# Patient Record
Sex: MALE | Race: WHITE | NOT HISPANIC OR LATINO | Employment: FULL TIME | URBAN - NONMETROPOLITAN AREA
[De-identification: names, ages, dates, MRNs, and addresses within clinical notes are randomized per-mention and may not be internally consistent; named-entity substitution may affect disease eponyms.]

---

## 2021-01-28 ENCOUNTER — APPOINTMENT (OUTPATIENT)
Dept: LAB | Facility: HOSPITAL | Age: 56
End: 2021-01-28

## 2021-01-28 ENCOUNTER — LAB (OUTPATIENT)
Dept: LAB | Facility: HOSPITAL | Age: 56
End: 2021-01-28

## 2021-01-28 ENCOUNTER — OFFICE VISIT (OUTPATIENT)
Dept: FAMILY MEDICINE CLINIC | Facility: CLINIC | Age: 56
End: 2021-01-28

## 2021-01-28 VITALS
RESPIRATION RATE: 20 BRPM | OXYGEN SATURATION: 100 % | TEMPERATURE: 97.7 F | SYSTOLIC BLOOD PRESSURE: 142 MMHG | WEIGHT: 265.9 LBS | HEIGHT: 72 IN | HEART RATE: 97 BPM | BODY MASS INDEX: 36.01 KG/M2 | DIASTOLIC BLOOD PRESSURE: 80 MMHG

## 2021-01-28 DIAGNOSIS — E11.9 TYPE 2 DIABETES MELLITUS WITHOUT COMPLICATION, WITHOUT LONG-TERM CURRENT USE OF INSULIN (HCC): ICD-10-CM

## 2021-01-28 DIAGNOSIS — Z12.5 PROSTATE CANCER SCREENING: ICD-10-CM

## 2021-01-28 DIAGNOSIS — R03.0 ELEVATED BP WITHOUT DIAGNOSIS OF HYPERTENSION: ICD-10-CM

## 2021-01-28 DIAGNOSIS — E66.01 CLASS 2 SEVERE OBESITY DUE TO EXCESS CALORIES WITH SERIOUS COMORBIDITY AND BODY MASS INDEX (BMI) OF 36.0 TO 36.9 IN ADULT (HCC): ICD-10-CM

## 2021-01-28 DIAGNOSIS — Z00.00 ANNUAL PHYSICAL EXAM: ICD-10-CM

## 2021-01-28 DIAGNOSIS — Z11.59 NEED FOR HEPATITIS C SCREENING TEST: ICD-10-CM

## 2021-01-28 DIAGNOSIS — K21.9 GASTROESOPHAGEAL REFLUX DISEASE WITHOUT ESOPHAGITIS: ICD-10-CM

## 2021-01-28 DIAGNOSIS — Z76.89 ENCOUNTER TO ESTABLISH CARE: Primary | ICD-10-CM

## 2021-01-28 LAB
ALBUMIN SERPL-MCNC: 4.3 G/DL (ref 3.5–5.2)
ALBUMIN/GLOB SERPL: 1.2 G/DL
ALP SERPL-CCNC: 119 U/L (ref 39–117)
ALT SERPL W P-5'-P-CCNC: 25 U/L (ref 1–41)
ANION GAP SERPL CALCULATED.3IONS-SCNC: 10 MMOL/L (ref 5–15)
AST SERPL-CCNC: 18 U/L (ref 1–40)
BILIRUB SERPL-MCNC: 0.2 MG/DL (ref 0–1.2)
BUN SERPL-MCNC: 19 MG/DL (ref 6–20)
BUN/CREAT SERPL: 18.3 (ref 7–25)
CALCIUM SPEC-SCNC: 10.3 MG/DL (ref 8.6–10.5)
CHLORIDE SERPL-SCNC: 99 MMOL/L (ref 98–107)
CO2 SERPL-SCNC: 27 MMOL/L (ref 22–29)
CREAT SERPL-MCNC: 1.04 MG/DL (ref 0.76–1.27)
GFR SERPL CREATININE-BSD FRML MDRD: 75 ML/MIN/1.73
GLOBULIN UR ELPH-MCNC: 3.6 GM/DL
GLUCOSE BLDC GLUCOMTR-MCNC: 295 MG/DL (ref 70–130)
GLUCOSE SERPL-MCNC: 316 MG/DL (ref 65–99)
HBA1C MFR BLD: 9.5 %
POTASSIUM SERPL-SCNC: 4.1 MMOL/L (ref 3.5–5.2)
PROT SERPL-MCNC: 7.9 G/DL (ref 6–8.5)
SODIUM SERPL-SCNC: 136 MMOL/L (ref 136–145)

## 2021-01-28 PROCEDURE — G0103 PSA SCREENING: HCPCS

## 2021-01-28 PROCEDURE — 85025 COMPLETE CBC W/AUTO DIFF WBC: CPT

## 2021-01-28 PROCEDURE — 82570 ASSAY OF URINE CREATININE: CPT

## 2021-01-28 PROCEDURE — 86803 HEPATITIS C AB TEST: CPT

## 2021-01-28 PROCEDURE — 82043 UR ALBUMIN QUANTITATIVE: CPT

## 2021-01-28 PROCEDURE — 80061 LIPID PANEL: CPT

## 2021-01-28 PROCEDURE — 84439 ASSAY OF FREE THYROXINE: CPT

## 2021-01-28 PROCEDURE — 36415 COLL VENOUS BLD VENIPUNCTURE: CPT

## 2021-01-28 PROCEDURE — 80053 COMPREHEN METABOLIC PANEL: CPT

## 2021-01-28 PROCEDURE — 83036 HEMOGLOBIN GLYCOSYLATED A1C: CPT | Performed by: NURSE PRACTITIONER

## 2021-01-28 PROCEDURE — 84443 ASSAY THYROID STIM HORMONE: CPT

## 2021-01-28 PROCEDURE — 99204 OFFICE O/P NEW MOD 45 MIN: CPT | Performed by: NURSE PRACTITIONER

## 2021-01-28 PROCEDURE — 82962 GLUCOSE BLOOD TEST: CPT | Performed by: NURSE PRACTITIONER

## 2021-01-28 RX ORDER — OMEPRAZOLE 20 MG/1
20 CAPSULE, DELAYED RELEASE ORAL DAILY
COMMUNITY
End: 2022-02-10 | Stop reason: HOSPADM

## 2021-01-28 RX ORDER — LOSARTAN POTASSIUM 25 MG/1
25 TABLET ORAL DAILY
Qty: 30 TABLET | Refills: 5 | Status: CANCELLED | OUTPATIENT
Start: 2021-01-28

## 2021-01-28 RX ORDER — METFORMIN HYDROCHLORIDE 500 MG/1
1000 TABLET, EXTENDED RELEASE ORAL 2 TIMES DAILY WITH MEALS
Qty: 360 TABLET | Refills: 2 | Status: SHIPPED | OUTPATIENT
Start: 2021-01-28

## 2021-01-28 NOTE — PATIENT INSTRUCTIONS
Diabetes Basics    Diabetes (diabetes mellitus) is a long-term (chronic) disease. It occurs when the body does not properly use sugar (glucose) that is released from food after you eat.  Diabetes may be caused by one or both of these problems:  · Your pancreas does not make enough of a hormone called insulin.  · Your body does not react in a normal way to insulin that it makes.  Insulin lets sugars (glucose) go into cells in your body. This gives you energy. If you have diabetes, sugars cannot get into cells. This causes high blood sugar (hyperglycemia).  Follow these instructions at home:  How is diabetes treated?  You may need to take insulin or other diabetes medicines daily to keep your blood sugar in balance. Take your diabetes medicines every day as told by your doctor. List your diabetes medicines here:  Diabetes medicines  · Name of medicine: ______________________________  ? Amount (dose): _______________ Time (a.m./p.m.): _______________ Notes: ___________________________________  · Name of medicine: ______________________________  ? Amount (dose): _______________ Time (a.m./p.m.): _______________ Notes: ___________________________________  · Name of medicine: ______________________________  ? Amount (dose): _______________ Time (a.m./p.m.): _______________ Notes: ___________________________________  If you use insulin, you will learn how to give yourself insulin by injection. You may need to adjust the amount based on the food that you eat. List the types of insulin you use here:  Insulin  · Insulin type: ______________________________  ? Amount (dose): _______________ Time (a.m./p.m.): _______________ Notes: ___________________________________  · Insulin type: ______________________________  ? Amount (dose): _______________ Time (a.m./p.m.): _______________ Notes: ___________________________________  · Insulin type: ______________________________  ? Amount (dose): _______________ Time (a.m./p.m.):  _______________ Notes: ___________________________________  · Insulin type: ______________________________  ? Amount (dose): _______________ Time (a.m./p.m.): _______________ Notes: ___________________________________  · Insulin type: ______________________________  ? Amount (dose): _______________ Time (a.m./p.m.): _______________ Notes: ___________________________________  How do I manage my blood sugar?    Check your blood sugar levels using a blood glucose monitor as directed by your doctor.  Your doctor will set treatment goals for you. Generally, you should have these blood sugar levels:  · Before meals (preprandial):  mg/dL (4.4-7.2 mmol/L).  · After meals (postprandial): below 180 mg/dL (10 mmol/L).  · A1c level: less than 7%.  Write down the times that you will check your blood sugar levels:  Blood sugar checks  · Time: _______________ Notes: ___________________________________  · Time: _______________ Notes: ___________________________________  · Time: _______________ Notes: ___________________________________  · Time: _______________ Notes: ___________________________________  · Time: _______________ Notes: ___________________________________  · Time: _______________ Notes: ___________________________________    What do I need to know about low blood sugar?  Low blood sugar is called hypoglycemia. This is when blood sugar is at or below 70 mg/dL (3.9 mmol/L). Symptoms may include:  · Feeling:  ? Hungry.  ? Worried or nervous (anxious).  ? Sweaty and clammy.  ? Confused.  ? Dizzy.  ? Sleepy.  ? Sick to your stomach (nauseous).  · Having:  ? A fast heartbeat.  ? A headache.  ? A change in your vision.  ? Tingling or no feeling (numbness) around the mouth, lips, or tongue.  ? Jerky movements that you cannot control (seizure).  · Having trouble with:  ? Moving (coordination).  ? Sleeping.  ? Passing out (fainting).  ? Getting upset easily (irritability).  Treating low blood sugar  To treat low blood  sugar, eat or drink something sugary right away. If you can think clearly and swallow safely, follow the 15:15 rule:  · Take 15 grams of a fast-acting carb (carbohydrate). Talk with your doctor about how much you should take.  · Some fast-acting carbs are:  ? Sugar tablets (glucose pills). Take 3-4 glucose pills.  ? 6-8 pieces of hard candy.  ? 4-6 oz (120-150 mL) of fruit juice.  ? 4-6 oz (120-150 mL) of regular (not diet) soda.  ? 1 Tbsp (15 mL) honey or sugar.  · Check your blood sugar 15 minutes after you take the carb.  · If your blood sugar is still at or below 70 mg/dL (3.9 mmol/L), take 15 grams of a carb again.  · If your blood sugar does not go above 70 mg/dL (3.9 mmol/L) after 3 tries, get help right away.  · After your blood sugar goes back to normal, eat a meal or a snack within 1 hour.  Treating very low blood sugar  If your blood sugar is at or below 54 mg/dL (3 mmol/L), you have very low blood sugar (severe hypoglycemia). This is an emergency. Do not wait to see if the symptoms will go away. Get medical help right away. Call your local emergency services (911 in the [place] Do not drive yourself to the hospital.  Questions to ask your health care provider  · Do I need to meet with a diabetes educator?  · What equipment will I need to care for myself at home?  · What diabetes medicines do I need? When should I take them?  · How often do I need to check my blood sugar?  · What number can I call if I have questions?  · When is my next doctor's visit?  · Where can I find a support group for people with diabetes?  Where to find more information  · American Diabetes Association: www.diabetes.org  · American Association of Diabetes Educators: www.diabeteseducator.org/patient-resources  Contact a doctor if:  · Your blood sugar is at or above 240 mg/dL (13.3 mmol/L) for 2 days in a row.  · You have been sick or have had a fever for 2 days or more, and you are not getting better.  · You have any of these  problems for more than 6 hours:  ? You cannot eat or drink.  ? You feel sick to your stomach (nauseous).  ? You throw up (vomit).  ? You have watery poop (diarrhea).  Get help right away if:  · Your blood sugar is lower than 54 mg/dL (3 mmol/L).  · You get confused.  · You have trouble:  ? Thinking clearly.  ? Breathing.  Summary  · Diabetes (diabetes mellitus) is a long-term (chronic) disease. It occurs when the body does not properly use sugar (glucose) that is released from food after digestion.  · Take insulin and diabetes medicines as told.  · Check your blood sugar every day, as often as told.  · Keep all follow-up visits as told by your doctor. This is important.  This information is not intended to replace advice given to you by your health care provider. Make sure you discuss any questions you have with your health care provider.  Document Revised: 09/09/2020 Document Reviewed: 03/22/2019  Elsevier Patient Education © 2020 [employer]

## 2021-01-28 NOTE — PROGRESS NOTES
Subjective   Louis Brooks is a 55 y.o. male.     Diabetes  He presents for his initial diabetic visit. He has type 2 diabetes mellitus. Onset time: at least 1 year. His disease course has been worsening. There are no hypoglycemic associated symptoms. Pertinent negatives for hypoglycemia include no dizziness. Associated symptoms include polydipsia, polyphagia and polyuria. Pertinent negatives for diabetes include no blurred vision, no chest pain, no fatigue, no weakness and no weight loss. There are no hypoglycemic complications. Symptoms are stable. There are no diabetic complications. Risk factors for coronary artery disease include family history, diabetes mellitus, male sex and obesity. When asked about current treatments, none were reported. He is compliant with treatment some of the time. His weight is stable. He is following a diabetic diet. Meal planning includes avoidance of concentrated sweets. He has not had a previous visit with a dietitian. He rarely participates in exercise. His breakfast blood glucose range is generally >200 mg/dl. (280 this morning  295 in office this afternoon  ) An ACE inhibitor/angiotensin II receptor blocker is not being taken. He does not see a podiatrist.Eye exam is not current.   Obesity  This is a chronic problem. The current episode started more than 1 year ago. The problem occurs constantly. The problem has been unchanged. Pertinent negatives include no abdominal pain, chest pain, chills, congestion, coughing, diaphoresis, fatigue, fever, myalgias, nausea, rash, sore throat, vomiting or weakness. The symptoms are aggravated by drinking and eating. He has tried eating and drinking for the symptoms. The treatment provided no relief.        The following portions of the patient's history were reviewed and updated as appropriate: allergies, current medications, past family history, past medical history, past social history, past surgical history and problem list.    Review of  Systems   Constitutional: Negative for activity change, appetite change, chills, diaphoresis, fatigue, fever, unexpected weight gain and unexpected weight loss.   HENT: Negative for congestion, sore throat, trouble swallowing and voice change.    Eyes: Negative for blurred vision, double vision, photophobia, pain and visual disturbance.   Respiratory: Negative for cough, chest tightness, shortness of breath and wheezing.    Cardiovascular: Negative for chest pain, palpitations and leg swelling.        Recheck blood pressure, 142/80.  Patient reports blood pressure at home is typically in the 130s/70s.   Gastrointestinal: Negative for abdominal distention, abdominal pain, anal bleeding, blood in stool, constipation, diarrhea, nausea, vomiting, GERD and indigestion.   Endocrine: Positive for polydipsia, polyphagia and polyuria. Negative for cold intolerance and heat intolerance.        Patient reports noticing polyuria, polydipsia and polyphagia approximately 1 year ago.  He bought a glucometer over-the-counter and his blood sugars at that time were running over 300.  He did not present for evaluation.  Instead he changed his diet.  Through diet modification he was able to get his glucose down into the high 100s.  Over the last year he states his diet has slipped and his blood sugar has been increasing again.  He has also noticed increase in polyuria and polyphagia along with fatigue.  He denies chest pain, shortness of breath or palpitations.    Fingerstick blood sugar in office today 295.  Stick hemoglobin A1c 9.5.   Genitourinary: Negative for dysuria, hematuria and urgency.   Musculoskeletal: Negative for back pain and myalgias.   Skin: Negative for rash.   Allergic/Immunologic: Negative.    Neurological: Negative for dizziness, syncope, weakness, light-headedness and headache.   Hematological: Negative.    Psychiatric/Behavioral: Negative for depressed mood.       Objective   Physical Exam  Vitals signs and  nursing note reviewed.   Constitutional:       General: He is not in acute distress.     Appearance: Normal appearance. He is well-developed. He is obese. He is not ill-appearing, toxic-appearing or diaphoretic.   HENT:      Head: Normocephalic and atraumatic.      Right Ear: External ear normal.      Left Ear: External ear normal.      Nose: Nose normal.   Eyes:      Conjunctiva/sclera: Conjunctivae normal.      Pupils: Pupils are equal, round, and reactive to light.   Neck:      Musculoskeletal: Normal range of motion and neck supple.      Thyroid: No thyromegaly.      Vascular: No carotid bruit.      Trachea: No tracheal deviation.   Cardiovascular:      Rate and Rhythm: Normal rate and regular rhythm.      Heart sounds: Normal heart sounds. No murmur. No friction rub. No gallop.    Pulmonary:      Effort: Pulmonary effort is normal. No respiratory distress.      Breath sounds: Normal breath sounds. No stridor. No wheezing, rhonchi or rales.   Abdominal:      General: Bowel sounds are normal. There is no distension.      Palpations: Abdomen is soft. There is no mass.      Tenderness: There is no abdominal tenderness. There is no guarding or rebound.      Hernia: No hernia is present.   Musculoskeletal: Normal range of motion.         General: No tenderness.   Lymphadenopathy:      Cervical: No cervical adenopathy.   Skin:     General: Skin is warm and dry.      Coloration: Skin is not pale.      Findings: No erythema or rash.   Neurological:      Mental Status: He is alert and oriented to person, place, and time.      Cranial Nerves: No cranial nerve deficit.      Coordination: Coordination normal.   Psychiatric:         Attention and Perception: Attention and perception normal.         Mood and Affect: Mood and affect normal.         Speech: Speech normal.         Behavior: Behavior normal. Behavior is cooperative.         Thought Content: Thought content normal. Thought content is not paranoid or delusional.  Thought content does not include homicidal or suicidal ideation. Thought content does not include homicidal or suicidal plan.         Cognition and Memory: Cognition and memory normal.         Judgment: Judgment normal.           Assessment/Plan   Diagnoses and all orders for this visit:    1. Encounter to establish care (Primary)    2. Annual physical exam  -     Hepatitis C Antibody; Future  -     CBC & Differential; Future  -     Comprehensive Metabolic Panel; Future  -     Lipid Panel; Future  -     T4, Free; Future  -     TSH; Future  -     PSA Screen; Future  -     Microalbumin / Creatinine Urine Ratio - Urine, Clean Catch; Future, will call with results.    3. Need for hepatitis C screening test  -     Hepatitis C Antibody; Future, will call with results.    4. Prostate cancer screening  -     PSA Screen; Future, will call with results.    5. Elevated BP without diagnosis of hypertension    -Patient was in a hurry today.  Upon recheck his blood pressure was improved to 142/80.  He reports blood pressures at home less than 140/90.  Instructed patient to continue to monitor blood pressure at home.  Low-sodium diet and weight loss recommended.  We will continue to monitor.    6. Class 2 severe obesity due to excess calories with serious comorbidity and body mass index (BMI) of 36.0 to 36.9 in adult (CMS/[facility])   -Strict diabetic diet.  Will start Metformin today as noted below.  Recommended exercise 3-5 times a week for least 30 minutes.  We will continue to monitor.    7. Type 2 diabetes mellitus without complication, without long-term current use of insulin (CMS/[facility])  -     Comprehensive Metabolic Panel; Future  -     Lipid Panel; Future  -     Microalbumin / Creatinine Urine Ratio - Urine, Clean Catch; Future  -     metFORMIN ER (GLUCOPHAGE-XR) 500 MG 24 hr tablet; Take 2 tablets by mouth 2 (Two) Times a Day With Meals.  Dispense: 360 tablet; Refill: 2  -     POCT Glucose, 295  -     POC Glycosylated Hemoglobin  (Hb A1C), 9.5   -Will start Metformin 500 mg 2 tabs twice a day with meals.  Instructed patient to slowly taper Metformin up as follows: 1 tablet twice a day for a week if tolerated will increase to 2 tablets in the morning 1 tablet in the evening x1 week if Metformin continues to be tolerated increase to 2 tablets twice daily.  Strict diabetic diet.  Patient has home glucometer.  Instructed patient to monitor and log twice daily and as needed for symptoms.  Diabetic pamphlets and handouts provided to patient.  Diabetic diet and routine exercise discussed with patient.  Patient verbalized understanding instruction.  We will continue to monitor.    8. Gastroesophageal reflux disease without esophagitis   -Currently well controlled with Prilosec 20 mg daily.  We will continue to monitor.    9.  Follow-up in 1 month or sooner for any acute needs.          This document has been electronically signed by FLORENCE Cabrera on January 28, 2021 16:16 CST

## 2021-01-29 LAB
ALBUMIN UR-MCNC: <1.2 MG/DL
BASOPHILS # BLD AUTO: 0.03 10*3/MM3 (ref 0–0.2)
BASOPHILS NFR BLD AUTO: 0.4 % (ref 0–1.5)
CHOLEST SERPL-MCNC: 216 MG/DL (ref 0–200)
CREAT UR-MCNC: 81.2 MG/DL
DEPRECATED RDW RBC AUTO: 43.2 FL (ref 37–54)
EOSINOPHIL # BLD AUTO: 0.28 10*3/MM3 (ref 0–0.4)
EOSINOPHIL NFR BLD AUTO: 3.7 % (ref 0.3–6.2)
ERYTHROCYTE [DISTWIDTH] IN BLOOD BY AUTOMATED COUNT: 13.5 % (ref 12.3–15.4)
HCT VFR BLD AUTO: 43.1 % (ref 37.5–51)
HCV AB SER DONR QL: NORMAL
HDLC SERPL-MCNC: 34 MG/DL (ref 40–60)
HGB BLD-MCNC: 14.9 G/DL (ref 13–17.7)
IMM GRANULOCYTES # BLD AUTO: 0.03 10*3/MM3 (ref 0–0.05)
IMM GRANULOCYTES NFR BLD AUTO: 0.4 % (ref 0–0.5)
LDLC SERPL CALC-MCNC: 146 MG/DL (ref 0–100)
LDLC/HDLC SERPL: 4.18 {RATIO}
LYMPHOCYTES # BLD AUTO: 2.84 10*3/MM3 (ref 0.7–3.1)
LYMPHOCYTES NFR BLD AUTO: 37.4 % (ref 19.6–45.3)
MCH RBC QN AUTO: 30.5 PG (ref 26.6–33)
MCHC RBC AUTO-ENTMCNC: 34.6 G/DL (ref 31.5–35.7)
MCV RBC AUTO: 88.3 FL (ref 79–97)
MICROALBUMIN/CREAT UR: NORMAL MG/G{CREAT}
MONOCYTES # BLD AUTO: 0.55 10*3/MM3 (ref 0.1–0.9)
MONOCYTES NFR BLD AUTO: 7.2 % (ref 5–12)
NEUTROPHILS NFR BLD AUTO: 3.86 10*3/MM3 (ref 1.7–7)
NEUTROPHILS NFR BLD AUTO: 50.9 % (ref 42.7–76)
NRBC BLD AUTO-RTO: 0 /100 WBC (ref 0–0.2)
PLATELET # BLD AUTO: 227 10*3/MM3 (ref 140–450)
PMV BLD AUTO: 10 FL (ref 6–12)
PSA SERPL-MCNC: 0.75 NG/ML (ref 0–4)
RBC # BLD AUTO: 4.88 10*6/MM3 (ref 4.14–5.8)
T4 FREE SERPL-MCNC: 1.06 NG/DL (ref 0.93–1.7)
TRIGL SERPL-MCNC: 200 MG/DL (ref 0–150)
TSH SERPL DL<=0.05 MIU/L-ACNC: 1.95 UIU/ML (ref 0.27–4.2)
VLDLC SERPL-MCNC: 36 MG/DL (ref 5–40)
WBC # BLD AUTO: 7.59 10*3/MM3 (ref 3.4–10.8)

## 2021-01-29 RX ORDER — ATORVASTATIN CALCIUM 40 MG/1
40 TABLET, FILM COATED ORAL NIGHTLY
Qty: 90 TABLET | Refills: 2 | Status: SHIPPED | OUTPATIENT
Start: 2021-01-29

## 2021-02-10 ENCOUNTER — TELEPHONE (OUTPATIENT)
Dept: FAMILY MEDICINE CLINIC | Facility: CLINIC | Age: 56
End: 2021-02-10

## 2021-02-10 NOTE — TELEPHONE ENCOUNTER
Patient was called to let him know PCP will discuss medication request at follow up visit as additional testing may be needed.     ----- Message from FLORENCE Cabrera sent at 2/10/2021 10:52 AM CST -----  Regarding: Non-Urgent Medical Question  Contact: [phone]  Will discuss at follow-up.  Prior to any ED medication he would need a little bit further eval such as an EKG before we could proceed.    ----- Message -----  From: Louis Brooks  Sent: 2/10/2021  10:45 AM CST  To: [facility] Clinical Pool  Subject: Non-Urgent Medical Question                      Since I have had new lab work completed, is it possible for you to prescribe me the Cialis Daily 5mg or would I have to make another appointment.?    Thank you.

## 2021-02-25 ENCOUNTER — OFFICE VISIT (OUTPATIENT)
Dept: FAMILY MEDICINE CLINIC | Facility: CLINIC | Age: 56
End: 2021-02-25

## 2021-02-25 VITALS
HEIGHT: 72 IN | BODY MASS INDEX: 29.43 KG/M2 | TEMPERATURE: 97.2 F | SYSTOLIC BLOOD PRESSURE: 158 MMHG | DIASTOLIC BLOOD PRESSURE: 90 MMHG | WEIGHT: 217.3 LBS | HEART RATE: 90 BPM | OXYGEN SATURATION: 98 % | RESPIRATION RATE: 18 BRPM

## 2021-02-25 DIAGNOSIS — K21.9 GASTROESOPHAGEAL REFLUX DISEASE WITHOUT ESOPHAGITIS: ICD-10-CM

## 2021-02-25 DIAGNOSIS — E11.9 TYPE 2 DIABETES MELLITUS WITHOUT COMPLICATION, WITHOUT LONG-TERM CURRENT USE OF INSULIN (HCC): Primary | ICD-10-CM

## 2021-02-25 DIAGNOSIS — E66.01 CLASS 2 SEVERE OBESITY DUE TO EXCESS CALORIES WITH SERIOUS COMORBIDITY AND BODY MASS INDEX (BMI) OF 36.0 TO 36.9 IN ADULT (HCC): ICD-10-CM

## 2021-02-25 DIAGNOSIS — N52.9 ERECTILE DYSFUNCTION, UNSPECIFIED ERECTILE DYSFUNCTION TYPE: ICD-10-CM

## 2021-02-25 DIAGNOSIS — I10 ESSENTIAL HYPERTENSION: ICD-10-CM

## 2021-02-25 DIAGNOSIS — E78.2 MIXED HYPERLIPIDEMIA: ICD-10-CM

## 2021-02-25 PROBLEM — R03.0 ELEVATED BP WITHOUT DIAGNOSIS OF HYPERTENSION: Status: ACTIVE | Noted: 2021-02-25

## 2021-02-25 PROCEDURE — 99214 OFFICE O/P EST MOD 30 MIN: CPT | Performed by: NURSE PRACTITIONER

## 2021-02-25 PROCEDURE — 93005 ELECTROCARDIOGRAM TRACING: CPT | Performed by: NURSE PRACTITIONER

## 2021-02-25 PROCEDURE — 93010 ELECTROCARDIOGRAM REPORT: CPT | Performed by: INTERNAL MEDICINE

## 2021-02-25 RX ORDER — EMPAGLIFLOZIN 10 MG/1
10 TABLET, FILM COATED ORAL DAILY
Qty: 30 TABLET | Refills: 5 | Status: SHIPPED | OUTPATIENT
Start: 2021-02-25 | End: 2021-07-20

## 2021-02-25 RX ORDER — LOSARTAN POTASSIUM 25 MG/1
25 TABLET ORAL DAILY
Qty: 30 TABLET | Refills: 5 | Status: SHIPPED | OUTPATIENT
Start: 2021-02-25 | End: 2021-06-02 | Stop reason: SDUPTHER

## 2021-02-25 RX ORDER — TADALAFIL 2.5 MG/1
2.5 TABLET ORAL DAILY
Qty: 30 TABLET | Refills: 1 | Status: SHIPPED | OUTPATIENT
Start: 2021-02-25 | End: 2021-02-26

## 2021-02-26 DIAGNOSIS — N52.9 ERECTILE DYSFUNCTION, UNSPECIFIED ERECTILE DYSFUNCTION TYPE: ICD-10-CM

## 2021-02-26 RX ORDER — TADALAFIL 5 MG/1
5 TABLET ORAL DAILY PRN
Qty: 30 TABLET | Refills: 3 | Status: SHIPPED | OUTPATIENT
Start: 2021-02-26 | End: 2021-08-12 | Stop reason: SDUPTHER

## 2021-03-17 LAB
QT INTERVAL: 392 MS
QTC INTERVAL: 457 MS

## 2021-06-02 DIAGNOSIS — I10 ESSENTIAL HYPERTENSION: ICD-10-CM

## 2021-06-02 RX ORDER — LOSARTAN POTASSIUM 25 MG/1
25 TABLET ORAL DAILY
Qty: 30 TABLET | Refills: 5 | Status: SHIPPED | OUTPATIENT
Start: 2021-06-02 | End: 2021-11-29

## 2021-07-19 ENCOUNTER — LAB (OUTPATIENT)
Dept: LAB | Facility: HOSPITAL | Age: 56
End: 2021-07-19

## 2021-07-19 ENCOUNTER — OFFICE VISIT (OUTPATIENT)
Dept: FAMILY MEDICINE CLINIC | Facility: CLINIC | Age: 56
End: 2021-07-19

## 2021-07-19 VITALS
HEART RATE: 85 BPM | TEMPERATURE: 96.3 F | DIASTOLIC BLOOD PRESSURE: 70 MMHG | WEIGHT: 259.7 LBS | SYSTOLIC BLOOD PRESSURE: 120 MMHG | HEIGHT: 72 IN | OXYGEN SATURATION: 96 % | RESPIRATION RATE: 20 BRPM | BODY MASS INDEX: 35.18 KG/M2

## 2021-07-19 DIAGNOSIS — I10 ESSENTIAL HYPERTENSION: Primary | ICD-10-CM

## 2021-07-19 DIAGNOSIS — E78.2 MIXED HYPERLIPIDEMIA: ICD-10-CM
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
## 2021-07-19 DIAGNOSIS — E11.9 TYPE 2 DIABETES MELLITUS WITHOUT COMPLICATION, WITHOUT LONG-TERM CURRENT USE OF INSULIN (HCC): ICD-10-CM

## 2021-07-19 DIAGNOSIS — S80.869A INSECT BITE OF LOWER LEG, UNSPECIFIED LATERALITY, INITIAL ENCOUNTER: ICD-10-CM

## 2021-07-19 PROCEDURE — 99214 OFFICE O/P EST MOD 30 MIN: CPT | Performed by: NURSE PRACTITIONER

## 2021-07-19 PROCEDURE — 80061 LIPID PANEL: CPT

## 2021-07-19 PROCEDURE — 80048 BASIC METABOLIC PNL TOTAL CA: CPT

## 2021-07-19 PROCEDURE — 83036 HEMOGLOBIN GLYCOSYLATED A1C: CPT

## 2021-07-19 PROCEDURE — 36415 COLL VENOUS BLD VENIPUNCTURE: CPT

## 2021-07-19 RX ORDER — TRIAMCINOLONE ACETONIDE 1 MG/G
CREAM TOPICAL 2 TIMES DAILY PRN
Qty: 60 G | Refills: 0 | Status: SHIPPED | OUTPATIENT
Start: 2021-07-19 | End: 2022-01-30

## 2021-07-19 RX ORDER — PERMETHRIN 50 MG/G
CREAM TOPICAL ONCE
Qty: 60 G | Refills: 2 | Status: SHIPPED | OUTPATIENT
Start: 2021-07-19 | End: 2021-07-19

## 2021-07-19 NOTE — PROGRESS NOTES
Subjective   Louis Brooks is a 55 y.o. male.     CC: Hypertension, hyperlipidemia, diabetes, insect bite    Hypertension  This is a chronic problem. The current episode started more than 1 year ago. The problem has been gradually improving since onset. The problem is controlled. Pertinent negatives include no chest pain, headaches, palpitations or shortness of breath. Risk factors for coronary artery disease include family history, dyslipidemia, diabetes mellitus, male gender, obesity and sedentary lifestyle. Current antihypertension treatment includes beta blockers. The current treatment provides significant improvement. Compliance problems include exercise and diet.  There is no history of angina, kidney disease or CAD/MI.   Hyperlipidemia  This is a chronic problem. The current episode started more than 1 year ago. Recent lipid tests were reviewed and are variable. Exacerbating diseases include diabetes and obesity. Factors aggravating his hyperlipidemia include fatty foods. Pertinent negatives include no chest pain, focal sensory loss, focal weakness, leg pain, myalgias or shortness of breath. Current antihyperlipidemic treatment includes statins. The current treatment provides significant improvement of lipids. Compliance problems include adherence to exercise and adherence to diet.  Risk factors for coronary artery disease include family history, dyslipidemia, diabetes mellitus, hypertension, male sex, obesity and a sedentary lifestyle.   Diabetes  He presents for his follow-up diabetic visit. He has type 2 diabetes mellitus. His disease course has been improving. There are no hypoglycemic associated symptoms. Pertinent negatives for hypoglycemia include no dizziness or headaches. There are no diabetic associated symptoms. Pertinent negatives for diabetes include no chest pain, no fatigue, no polydipsia, no polyphagia, no polyuria, no weakness and no weight loss. There are no hypoglycemic complications.  Symptoms are stable. There are no diabetic complications. Current diabetic treatment includes oral agent (dual therapy). He is compliant with treatment most of the time. He rarely participates in exercise. An ACE inhibitor/angiotensin II receptor blocker is being taken.   Insect Bite  This is a new problem. The current episode started in the past 7 days. The problem occurs constantly. The problem has been unchanged. Pertinent negatives include no abdominal pain, arthralgias, chest pain, chills, congestion, coughing, fatigue, fever, headaches, myalgias, nausea, rash, sore throat, vomiting or weakness. Nothing aggravates the symptoms. He has tried nothing for the symptoms. The treatment provided no relief.        The following portions of the patient's history were reviewed and updated as appropriate: allergies, current medications, past family history, past medical history, past social history, past surgical history and problem list.    Review of Systems   Constitutional: Negative for activity change, appetite change, chills, fatigue, fever, unexpected weight gain and unexpected weight loss.   HENT: Negative for congestion, sore throat, trouble swallowing and voice change.    Eyes: Negative.    Respiratory: Negative for cough, chest tightness, shortness of breath and wheezing.    Cardiovascular: Negative for chest pain, palpitations and leg swelling.   Gastrointestinal: Negative for abdominal pain, diarrhea, nausea and vomiting.   Endocrine: Negative.  Negative for cold intolerance, heat intolerance, polydipsia, polyphagia and polyuria.        Reports improvement in blood sugars.  Fingerstick blood sugars averaging 115-140.  Tolerating medication well   Genitourinary: Negative for dysuria, hematuria and urgency.   Musculoskeletal: Negative for arthralgias and myalgias.   Skin: Positive for skin lesions. Negative for rash.        Patient report being exposed to chiggers at a worksite.  Reports itching and lesions to  the lower extremities and her ankles.   Neurological: Negative for dizziness, focal weakness, weakness, light-headedness and headache.   Hematological: Negative.    Psychiatric/Behavioral: Negative.        Objective   Physical Exam  Vitals and nursing note reviewed.   Constitutional:       General: He is not in acute distress.     Appearance: Normal appearance. He is well-developed. He is obese. He is not ill-appearing, toxic-appearing or diaphoretic.   HENT:      Head: Normocephalic and atraumatic.   Eyes:      Conjunctiva/sclera: Conjunctivae normal.   Cardiovascular:      Rate and Rhythm: Normal rate and regular rhythm.      Heart sounds: Normal heart sounds. No murmur heard.   No friction rub. No gallop.    Pulmonary:      Effort: Pulmonary effort is normal. No respiratory distress.      Breath sounds: Normal breath sounds. No stridor. No wheezing, rhonchi or rales.   Abdominal:      General: Bowel sounds are normal. There is no distension.      Palpations: Abdomen is soft. There is no mass.      Tenderness: There is no abdominal tenderness. There is no guarding or rebound.      Hernia: No hernia is present.   Musculoskeletal:         General: No tenderness. Normal range of motion.      Cervical back: Normal range of motion.   Skin:     General: Skin is warm and dry.      Coloration: Skin is not pale.      Findings: Erythema and rash present.          Neurological:      Mental Status: He is alert and oriented to person, place, and time.   Psychiatric:         Mood and Affect: Mood normal.         Behavior: Behavior normal.         Thought Content: Thought content normal.         Judgment: Judgment normal.           Assessment/Plan   Diagnoses and all orders for this visit:    1. Essential hypertension (Primary)   -Controlled.  Continue losartan as prescribed.  We will continue to monitor.    2. Type 2 diabetes mellitus without complication, without long-term current use of insulin (CMS/[facility])  -     Hemoglobin  A1c; Future  -     Basic Metabolic Panel; Future  -     Refill, glucose blood test strip; 1 each by Other route 2 (two) times a day. Use as instructed  Dispense: 60 each; Refill: 12   -Improved fingerstick blood sugars.  Continue Jardiance and Metformin as prescribed along with diabetic diet.  Will call with lab results.    3. Mixed hyperlipidemia  -     Lipid Panel; Future, will call with results.  Continue low-fat diet Lipitor as prescribed.      4. Insect bite of lower leg, unspecified laterality, initial encounter  -     permethrin (ELIMITE) 5 % cream; Apply  topically to the appropriate area as directed 1 (One) Time for 1 dose.  Dispense: 60 g; Refill: 2  -     triamcinolone (KENALOG) 0.1 % cream; Apply  topically to the appropriate area as directed 2 (Two) Times a Day As Needed for Rash.  Dispense: 60 g; Refill: 0    5.  Follow-up in 6 months or sooner for any acute needs.          This document has been electronically signed by FLORENCE Cabrera on July 19, 2021 16:53 CDT

## 2021-07-20 DIAGNOSIS — E11.9 TYPE 2 DIABETES MELLITUS WITHOUT COMPLICATION, WITHOUT LONG-TERM CURRENT USE OF INSULIN (HCC): ICD-10-CM

## 2021-07-20 LAB
ANION GAP SERPL CALCULATED.3IONS-SCNC: 12.9 MMOL/L (ref 5–15)
BUN SERPL-MCNC: 18 MG/DL (ref 6–20)
BUN/CREAT SERPL: 19.6 (ref 7–25)
CALCIUM SPEC-SCNC: 9.6 MG/DL (ref 8.6–10.5)
CHLORIDE SERPL-SCNC: 102 MMOL/L (ref 98–107)
CHOLEST SERPL-MCNC: 164 MG/DL (ref 0–200)
CO2 SERPL-SCNC: 23.1 MMOL/L (ref 22–29)
CREAT SERPL-MCNC: 0.92 MG/DL (ref 0.76–1.27)
GFR SERPL CREATININE-BSD FRML MDRD: 86 ML/MIN/1.73
GLUCOSE SERPL-MCNC: 156 MG/DL (ref 65–99)
HBA1C MFR BLD: 8.5 % (ref 4.8–5.6)
HDLC SERPL-MCNC: 31 MG/DL (ref 40–60)
LDLC SERPL CALC-MCNC: 100 MG/DL (ref 0–100)
LDLC/HDLC SERPL: 3.06 {RATIO}
POTASSIUM SERPL-SCNC: 3.9 MMOL/L (ref 3.5–5.2)
SODIUM SERPL-SCNC: 138 MMOL/L (ref 136–145)
TRIGL SERPL-MCNC: 190 MG/DL (ref 0–150)
VLDLC SERPL-MCNC: 33 MG/DL (ref 5–40)

## 2021-07-20 NOTE — PROGRESS NOTES
Hemoglobin A1c improved but still elevated, 8.5.  I am going to increase his Jardiance to 25 mg a day.  He needs to be more consistent with his Metformin.  If he is not tolerating Metformin he needs to let me know so that I can consider different options.  Diabetic diet and routine exercise recommended.  Cholesterol improving.  Continue low-fat diet cholesterol medication as prescribed.  Avoid fatty foods.  Follow-up in 6 months or sooner for any acute needs.

## 2021-07-29 ENCOUNTER — DOCUMENTATION (OUTPATIENT)
Dept: FAMILY MEDICINE CLINIC | Facility: CLINIC | Age: 56
End: 2021-07-29

## 2021-07-29 NOTE — PROGRESS NOTES
Prior authorization for JARDIANCE was approved from 07/27/2021-12/31/2099.  Patient/pharmacy notified.

## 2021-08-12 DIAGNOSIS — N52.9 ERECTILE DYSFUNCTION, UNSPECIFIED ERECTILE DYSFUNCTION TYPE: ICD-10-CM

## 2021-08-12 RX ORDER — TADALAFIL 5 MG/1
5 TABLET ORAL DAILY PRN
Qty: 30 TABLET | Refills: 3 | Status: SHIPPED | OUTPATIENT
Start: 2021-08-12 | End: 2021-12-28

## 2021-11-26 DIAGNOSIS — I10 ESSENTIAL HYPERTENSION: ICD-10-CM

## 2021-11-29 RX ORDER — LOSARTAN POTASSIUM 25 MG/1
TABLET ORAL
Qty: 90 TABLET | Refills: 3 | Status: SHIPPED | OUTPATIENT
Start: 2021-11-29

## 2021-12-28 DIAGNOSIS — N52.9 ERECTILE DYSFUNCTION, UNSPECIFIED ERECTILE DYSFUNCTION TYPE: ICD-10-CM

## 2021-12-28 RX ORDER — TADALAFIL 5 MG/1
TABLET ORAL
Qty: 30 TABLET | Refills: 0 | Status: SHIPPED | OUTPATIENT
Start: 2021-12-28

## 2022-01-30 ENCOUNTER — APPOINTMENT (OUTPATIENT)
Dept: GENERAL RADIOLOGY | Facility: HOSPITAL | Age: 57
End: 2022-01-30

## 2022-01-30 ENCOUNTER — APPOINTMENT (OUTPATIENT)
Dept: CT IMAGING | Facility: HOSPITAL | Age: 57
End: 2022-01-30

## 2022-01-30 ENCOUNTER — HOSPITAL ENCOUNTER (INPATIENT)
Facility: HOSPITAL | Age: 57
LOS: 11 days | Discharge: ANOTHER HOSPITAL | End: 2022-02-10
Attending: EMERGENCY MEDICINE | Admitting: INTERNAL MEDICINE

## 2022-01-30 DIAGNOSIS — Z74.09 IMPAIRED FUNCTIONAL MOBILITY, BALANCE, GAIT, AND ENDURANCE: ICD-10-CM

## 2022-01-30 DIAGNOSIS — J18.9 PNEUMONIA OF BOTH LUNGS DUE TO INFECTIOUS ORGANISM, UNSPECIFIED PART OF LUNG: Primary | ICD-10-CM

## 2022-01-30 DIAGNOSIS — U07.1 PNEUMONIA DUE TO COVID-19 VIRUS: ICD-10-CM

## 2022-01-30 DIAGNOSIS — J96.01 ACUTE RESPIRATORY FAILURE WITH HYPOXIA: ICD-10-CM

## 2022-01-30 DIAGNOSIS — J12.82 PNEUMONIA DUE TO COVID-19 VIRUS: ICD-10-CM

## 2022-01-30 LAB
ALBUMIN SERPL-MCNC: 4.3 G/DL (ref 3.5–5.2)
ALBUMIN/GLOB SERPL: 1.3 G/DL
ALP SERPL-CCNC: 98 U/L (ref 39–117)
ALT SERPL W P-5'-P-CCNC: 33 U/L (ref 1–41)
ANION GAP SERPL CALCULATED.3IONS-SCNC: 14 MMOL/L (ref 5–15)
ANISOCYTOSIS BLD QL: NORMAL
AST SERPL-CCNC: 76 U/L (ref 1–40)
BASOPHILS # BLD AUTO: 0.01 10*3/MM3 (ref 0–0.2)
BASOPHILS NFR BLD AUTO: 0.4 % (ref 0–1.5)
BILIRUB SERPL-MCNC: 0.4 MG/DL (ref 0–1.2)
BUN SERPL-MCNC: 20 MG/DL (ref 6–20)
BUN/CREAT SERPL: 16.7 (ref 7–25)
CALCIUM SPEC-SCNC: 9.3 MG/DL (ref 8.6–10.5)
CHLORIDE SERPL-SCNC: 99 MMOL/L (ref 98–107)
CO2 SERPL-SCNC: 28 MMOL/L (ref 22–29)
CREAT SERPL-MCNC: 1.2 MG/DL (ref 0.76–1.27)
CRP SERPL-MCNC: 4.81 MG/DL (ref 0–0.5)
D-DIMER, QUANTITATIVE (MAD,POW, STR): 985 NG/ML (FEU) (ref 0–470)
DEPRECATED RDW RBC AUTO: 43.8 FL (ref 37–54)
EOSINOPHIL # BLD AUTO: 0 10*3/MM3 (ref 0–0.4)
EOSINOPHIL NFR BLD AUTO: 0 % (ref 0.3–6.2)
ERYTHROCYTE [DISTWIDTH] IN BLOOD BY AUTOMATED COUNT: 13.8 % (ref 12.3–15.4)
FERRITIN SERPL-MCNC: 2116 NG/ML (ref 30–400)
FLUAV SUBTYP SPEC NAA+PROBE: NOT DETECTED
FLUBV RNA ISLT QL NAA+PROBE: NOT DETECTED
GFR SERPL CREATININE-BSD FRML MDRD: 63 ML/MIN/1.73
GLOBULIN UR ELPH-MCNC: 3.4 GM/DL
GLUCOSE BLDC GLUCOMTR-MCNC: 252 MG/DL (ref 70–130)
GLUCOSE SERPL-MCNC: 203 MG/DL (ref 65–99)
HCT VFR BLD AUTO: 43.6 % (ref 37.5–51)
HGB BLD-MCNC: 15 G/DL (ref 13–17.7)
HOLD SPECIMEN: NORMAL
IMM GRANULOCYTES # BLD AUTO: 0.01 10*3/MM3 (ref 0–0.05)
IMM GRANULOCYTES NFR BLD AUTO: 0.4 % (ref 0–0.5)
LDH SERPL-CCNC: 601 U/L (ref 135–225)
LYMPHOCYTES # BLD AUTO: 0.59 10*3/MM3 (ref 0.7–3.1)
LYMPHOCYTES NFR BLD AUTO: 25.3 % (ref 19.6–45.3)
MCH RBC QN AUTO: 29.9 PG (ref 26.6–33)
MCHC RBC AUTO-ENTMCNC: 34.4 G/DL (ref 31.5–35.7)
MCV RBC AUTO: 87 FL (ref 79–97)
MONOCYTES # BLD AUTO: 0.14 10*3/MM3 (ref 0.1–0.9)
MONOCYTES NFR BLD AUTO: 6 % (ref 5–12)
NEUTROPHILS NFR BLD AUTO: 1.58 10*3/MM3 (ref 1.7–7)
NEUTROPHILS NFR BLD AUTO: 67.9 % (ref 42.7–76)
NRBC BLD AUTO-RTO: 0 /100 WBC (ref 0–0.2)
NT-PROBNP SERPL-MCNC: 27.6 PG/ML (ref 0–900)
PLATELET # BLD AUTO: 80 10*3/MM3 (ref 140–450)
PMV BLD AUTO: 10.6 FL (ref 6–12)
POTASSIUM SERPL-SCNC: 3.9 MMOL/L (ref 3.5–5.2)
PROCALCITONIN SERPL-MCNC: 0.13 NG/ML (ref 0–0.25)
PROT SERPL-MCNC: 7.7 G/DL (ref 6–8.5)
RBC # BLD AUTO: 5.01 10*6/MM3 (ref 4.14–5.8)
SARS-COV-2 RNA PNL SPEC NAA+PROBE: DETECTED
SMALL PLATELETS BLD QL SMEAR: NORMAL
SODIUM SERPL-SCNC: 141 MMOL/L (ref 136–145)
TROPONIN T SERPL-MCNC: <0.01 NG/ML (ref 0–0.03)
WBC MORPH BLD: NORMAL
WBC NRBC COR # BLD: 2.33 10*3/MM3 (ref 3.4–10.8)
WHOLE BLOOD HOLD SPECIMEN: NORMAL
WHOLE BLOOD HOLD SPECIMEN: NORMAL

## 2022-01-30 PROCEDURE — 87636 SARSCOV2 & INF A&B AMP PRB: CPT | Performed by: EMERGENCY MEDICINE

## 2022-01-30 PROCEDURE — 0 IOPAMIDOL PER 1 ML: Performed by: EMERGENCY MEDICINE

## 2022-01-30 PROCEDURE — 83880 ASSAY OF NATRIURETIC PEPTIDE: CPT | Performed by: EMERGENCY MEDICINE

## 2022-01-30 PROCEDURE — 86140 C-REACTIVE PROTEIN: CPT | Performed by: NURSE PRACTITIONER

## 2022-01-30 PROCEDURE — 93005 ELECTROCARDIOGRAM TRACING: CPT | Performed by: EMERGENCY MEDICINE

## 2022-01-30 PROCEDURE — 84484 ASSAY OF TROPONIN QUANT: CPT | Performed by: EMERGENCY MEDICINE

## 2022-01-30 PROCEDURE — 63710000001 DEXAMETHASONE PER 0.25 MG: Performed by: NURSE PRACTITIONER

## 2022-01-30 PROCEDURE — 63710000001 INSULIN ASPART PER 5 UNITS: Performed by: NURSE PRACTITIONER

## 2022-01-30 PROCEDURE — 80053 COMPREHEN METABOLIC PANEL: CPT | Performed by: EMERGENCY MEDICINE

## 2022-01-30 PROCEDURE — 85007 BL SMEAR W/DIFF WBC COUNT: CPT | Performed by: EMERGENCY MEDICINE

## 2022-01-30 PROCEDURE — 99284 EMERGENCY DEPT VISIT MOD MDM: CPT

## 2022-01-30 PROCEDURE — 36415 COLL VENOUS BLD VENIPUNCTURE: CPT | Performed by: EMERGENCY MEDICINE

## 2022-01-30 PROCEDURE — 87040 BLOOD CULTURE FOR BACTERIA: CPT | Performed by: EMERGENCY MEDICINE

## 2022-01-30 PROCEDURE — 94640 AIRWAY INHALATION TREATMENT: CPT

## 2022-01-30 PROCEDURE — 93010 ELECTROCARDIOGRAM REPORT: CPT | Performed by: INTERNAL MEDICINE

## 2022-01-30 PROCEDURE — 71045 X-RAY EXAM CHEST 1 VIEW: CPT

## 2022-01-30 PROCEDURE — 82728 ASSAY OF FERRITIN: CPT | Performed by: NURSE PRACTITIONER

## 2022-01-30 PROCEDURE — 84145 PROCALCITONIN (PCT): CPT | Performed by: EMERGENCY MEDICINE

## 2022-01-30 PROCEDURE — 85379 FIBRIN DEGRADATION QUANT: CPT | Performed by: EMERGENCY MEDICINE

## 2022-01-30 PROCEDURE — 85025 COMPLETE CBC W/AUTO DIFF WBC: CPT | Performed by: EMERGENCY MEDICINE

## 2022-01-30 PROCEDURE — 25010000002 METHYLPREDNISOLONE PER 125 MG: Performed by: EMERGENCY MEDICINE

## 2022-01-30 PROCEDURE — 83615 LACTATE (LD) (LDH) ENZYME: CPT | Performed by: NURSE PRACTITIONER

## 2022-01-30 PROCEDURE — 82962 GLUCOSE BLOOD TEST: CPT

## 2022-01-30 PROCEDURE — 71275 CT ANGIOGRAPHY CHEST: CPT

## 2022-01-30 RX ORDER — ACETAMINOPHEN 160 MG/5ML
650 SOLUTION ORAL EVERY 4 HOURS PRN
Status: DISCONTINUED | OUTPATIENT
Start: 2022-01-30 | End: 2022-01-30 | Stop reason: SDUPTHER

## 2022-01-30 RX ORDER — BENZONATATE 100 MG/1
100 CAPSULE ORAL 3 TIMES DAILY PRN
Status: DISCONTINUED | OUTPATIENT
Start: 2022-01-30 | End: 2022-02-10 | Stop reason: HOSPADM

## 2022-01-30 RX ORDER — DEXTROMETHORPHAN POLISTIREX 30 MG/5ML
60 SUSPENSION ORAL EVERY 12 HOURS PRN
Status: DISCONTINUED | OUTPATIENT
Start: 2022-01-30 | End: 2022-02-10 | Stop reason: HOSPADM

## 2022-01-30 RX ORDER — ONDANSETRON 2 MG/ML
4 INJECTION INTRAMUSCULAR; INTRAVENOUS EVERY 6 HOURS PRN
Status: DISCONTINUED | OUTPATIENT
Start: 2022-01-30 | End: 2022-02-10 | Stop reason: HOSPADM

## 2022-01-30 RX ORDER — ACETAMINOPHEN 325 MG/1
650 TABLET ORAL EVERY 4 HOURS PRN
Status: DISCONTINUED | OUTPATIENT
Start: 2022-01-30 | End: 2022-02-10 | Stop reason: HOSPADM

## 2022-01-30 RX ORDER — SODIUM CHLORIDE 0.9 % (FLUSH) 0.9 %
10 SYRINGE (ML) INJECTION AS NEEDED
Status: DISCONTINUED | OUTPATIENT
Start: 2022-01-30 | End: 2022-02-10 | Stop reason: HOSPADM

## 2022-01-30 RX ORDER — LOSARTAN POTASSIUM 25 MG/1
25 TABLET ORAL DAILY
Status: DISCONTINUED | OUTPATIENT
Start: 2022-01-30 | End: 2022-02-10 | Stop reason: HOSPADM

## 2022-01-30 RX ORDER — LANOLIN ALCOHOL/MO/W.PET/CERES
5 CREAM (GRAM) TOPICAL NIGHTLY PRN
Status: DISCONTINUED | OUTPATIENT
Start: 2022-01-30 | End: 2022-02-10 | Stop reason: HOSPADM

## 2022-01-30 RX ORDER — ALBUTEROL SULFATE 90 UG/1
2 AEROSOL, METERED RESPIRATORY (INHALATION)
Status: DISCONTINUED | OUTPATIENT
Start: 2022-01-30 | End: 2022-02-10 | Stop reason: HOSPADM

## 2022-01-30 RX ORDER — ACETAMINOPHEN 650 MG/1
650 SUPPOSITORY RECTAL EVERY 4 HOURS PRN
Status: DISCONTINUED | OUTPATIENT
Start: 2022-01-30 | End: 2022-02-10 | Stop reason: HOSPADM

## 2022-01-30 RX ORDER — CALCIUM CARBONATE 200(500)MG
2 TABLET,CHEWABLE ORAL 2 TIMES DAILY PRN
Status: DISCONTINUED | OUTPATIENT
Start: 2022-01-30 | End: 2022-02-10 | Stop reason: HOSPADM

## 2022-01-30 RX ORDER — ONDANSETRON 4 MG/1
4 TABLET, FILM COATED ORAL EVERY 6 HOURS PRN
Status: DISCONTINUED | OUTPATIENT
Start: 2022-01-30 | End: 2022-02-10 | Stop reason: HOSPADM

## 2022-01-30 RX ORDER — DEXAMETHASONE SODIUM PHOSPHATE 4 MG/ML
6 INJECTION, SOLUTION INTRA-ARTICULAR; INTRALESIONAL; INTRAMUSCULAR; INTRAVENOUS; SOFT TISSUE DAILY
Status: DISCONTINUED | OUTPATIENT
Start: 2022-01-30 | End: 2022-01-31

## 2022-01-30 RX ORDER — NICOTINE POLACRILEX 4 MG
15 LOZENGE BUCCAL
Status: DISCONTINUED | OUTPATIENT
Start: 2022-01-30 | End: 2022-02-10 | Stop reason: HOSPADM

## 2022-01-30 RX ORDER — DEXTROSE MONOHYDRATE 25 G/50ML
25 INJECTION, SOLUTION INTRAVENOUS
Status: DISCONTINUED | OUTPATIENT
Start: 2022-01-30 | End: 2022-02-10 | Stop reason: HOSPADM

## 2022-01-30 RX ORDER — SODIUM CHLORIDE 0.9 % (FLUSH) 0.9 %
10 SYRINGE (ML) INJECTION EVERY 12 HOURS SCHEDULED
Status: DISCONTINUED | OUTPATIENT
Start: 2022-01-30 | End: 2022-02-10 | Stop reason: HOSPADM

## 2022-01-30 RX ORDER — DEXAMETHASONE SODIUM PHOSPHATE 4 MG/ML
4 INJECTION, SOLUTION INTRA-ARTICULAR; INTRALESIONAL; INTRAMUSCULAR; INTRAVENOUS; SOFT TISSUE ONCE
Status: DISCONTINUED | OUTPATIENT
Start: 2022-01-30 | End: 2022-01-30

## 2022-01-30 RX ORDER — METHYLPREDNISOLONE SODIUM SUCCINATE 125 MG/2ML
125 INJECTION, POWDER, LYOPHILIZED, FOR SOLUTION INTRAMUSCULAR; INTRAVENOUS ONCE
Status: COMPLETED | OUTPATIENT
Start: 2022-01-30 | End: 2022-01-30

## 2022-01-30 RX ORDER — ATORVASTATIN CALCIUM 40 MG/1
40 TABLET, FILM COATED ORAL NIGHTLY
Status: DISCONTINUED | OUTPATIENT
Start: 2022-01-30 | End: 2022-02-10 | Stop reason: HOSPADM

## 2022-01-30 RX ORDER — BUDESONIDE AND FORMOTEROL FUMARATE DIHYDRATE 160; 4.5 UG/1; UG/1
2 AEROSOL RESPIRATORY (INHALATION)
Status: DISCONTINUED | OUTPATIENT
Start: 2022-01-30 | End: 2022-02-10 | Stop reason: HOSPADM

## 2022-01-30 RX ORDER — LOPERAMIDE HYDROCHLORIDE 2 MG/1
2 CAPSULE ORAL 4 TIMES DAILY PRN
Status: DISCONTINUED | OUTPATIENT
Start: 2022-01-30 | End: 2022-02-10 | Stop reason: HOSPADM

## 2022-01-30 RX ADMIN — IPRATROPIUM BROMIDE 2 PUFF: 17 AEROSOL, METERED RESPIRATORY (INHALATION) at 22:27

## 2022-01-30 RX ADMIN — INSULIN ASPART 6 UNITS: 100 INJECTION, SOLUTION INTRAVENOUS; SUBCUTANEOUS at 17:29

## 2022-01-30 RX ADMIN — ATORVASTATIN CALCIUM 40 MG: 40 TABLET, FILM COATED ORAL at 21:02

## 2022-01-30 RX ADMIN — BUDESONIDE AND FORMOTEROL FUMARATE DIHYDRATE 2 PUFF: 160; 4.5 AEROSOL RESPIRATORY (INHALATION) at 22:27

## 2022-01-30 RX ADMIN — IOPAMIDOL 58 ML: 755 INJECTION, SOLUTION INTRAVENOUS at 11:57

## 2022-01-30 RX ADMIN — METHYLPREDNISOLONE SODIUM SUCCINATE 125 MG: 125 INJECTION, POWDER, FOR SOLUTION INTRAMUSCULAR; INTRAVENOUS at 10:50

## 2022-01-30 RX ADMIN — SODIUM CHLORIDE, PRESERVATIVE FREE 10 ML: 5 INJECTION INTRAVENOUS at 21:05

## 2022-01-30 RX ADMIN — DEXAMETHASONE 6 MG: 2 TABLET ORAL at 17:29

## 2022-01-30 RX ADMIN — ALBUTEROL SULFATE 2 PUFF: 90 AEROSOL, METERED RESPIRATORY (INHALATION) at 22:27

## 2022-01-31 LAB
ALBUMIN SERPL-MCNC: 3.9 G/DL (ref 3.5–5.2)
ALBUMIN/GLOB SERPL: 1.3 G/DL
ALP SERPL-CCNC: 91 U/L (ref 39–117)
ALT SERPL W P-5'-P-CCNC: 29 U/L (ref 1–41)
ANION GAP SERPL CALCULATED.3IONS-SCNC: 16 MMOL/L (ref 5–15)
AST SERPL-CCNC: 60 U/L (ref 1–40)
BASOPHILS # BLD AUTO: 0 10*3/MM3 (ref 0–0.2)
BASOPHILS NFR BLD AUTO: 0 % (ref 0–1.5)
BILIRUB SERPL-MCNC: 0.3 MG/DL (ref 0–1.2)
BUN SERPL-MCNC: 32 MG/DL (ref 6–20)
BUN/CREAT SERPL: 31.1 (ref 7–25)
CALCIUM SPEC-SCNC: 9 MG/DL (ref 8.6–10.5)
CHLORIDE SERPL-SCNC: 101 MMOL/L (ref 98–107)
CO2 SERPL-SCNC: 23 MMOL/L (ref 22–29)
CREAT SERPL-MCNC: 1.03 MG/DL (ref 0.76–1.27)
CRP SERPL-MCNC: 3.81 MG/DL (ref 0–0.5)
D-DIMER, QUANTITATIVE (MAD,POW, STR): 826 NG/ML (FEU) (ref 0–470)
DEPRECATED RDW RBC AUTO: 44 FL (ref 37–54)
EOSINOPHIL # BLD AUTO: 0 10*3/MM3 (ref 0–0.4)
EOSINOPHIL NFR BLD AUTO: 0 % (ref 0.3–6.2)
ERYTHROCYTE [DISTWIDTH] IN BLOOD BY AUTOMATED COUNT: 13.6 % (ref 12.3–15.4)
FERRITIN SERPL-MCNC: 2412 NG/ML (ref 30–400)
GFR SERPL CREATININE-BSD FRML MDRD: 75 ML/MIN/1.73
GLOBULIN UR ELPH-MCNC: 3.1 GM/DL
GLUCOSE BLDC GLUCOMTR-MCNC: 205 MG/DL (ref 70–130)
GLUCOSE BLDC GLUCOMTR-MCNC: 244 MG/DL (ref 70–130)
GLUCOSE BLDC GLUCOMTR-MCNC: 259 MG/DL (ref 70–130)
GLUCOSE BLDC GLUCOMTR-MCNC: 278 MG/DL (ref 70–130)
GLUCOSE SERPL-MCNC: 278 MG/DL (ref 65–99)
HCT VFR BLD AUTO: 44.2 % (ref 37.5–51)
HGB BLD-MCNC: 15.1 G/DL (ref 13–17.7)
IMM GRANULOCYTES # BLD AUTO: 0.01 10*3/MM3 (ref 0–0.05)
IMM GRANULOCYTES NFR BLD AUTO: 0.3 % (ref 0–0.5)
LDH SERPL-CCNC: 564 U/L (ref 135–225)
LYMPHOCYTES # BLD AUTO: 0.72 10*3/MM3 (ref 0.7–3.1)
LYMPHOCYTES NFR BLD AUTO: 21.8 % (ref 19.6–45.3)
MCH RBC QN AUTO: 30.1 PG (ref 26.6–33)
MCHC RBC AUTO-ENTMCNC: 34.2 G/DL (ref 31.5–35.7)
MCV RBC AUTO: 88 FL (ref 79–97)
MONOCYTES # BLD AUTO: 0.28 10*3/MM3 (ref 0.1–0.9)
MONOCYTES NFR BLD AUTO: 8.5 % (ref 5–12)
NEUTROPHILS NFR BLD AUTO: 2.29 10*3/MM3 (ref 1.7–7)
NEUTROPHILS NFR BLD AUTO: 69.4 % (ref 42.7–76)
NRBC BLD AUTO-RTO: 0 /100 WBC (ref 0–0.2)
PLATELET # BLD AUTO: 103 10*3/MM3 (ref 140–450)
PMV BLD AUTO: 10.9 FL (ref 6–12)
POTASSIUM SERPL-SCNC: 3.9 MMOL/L (ref 3.5–5.2)
PROCALCITONIN SERPL-MCNC: 0.13 NG/ML (ref 0–0.25)
PROT SERPL-MCNC: 7 G/DL (ref 6–8.5)
RBC # BLD AUTO: 5.02 10*6/MM3 (ref 4.14–5.8)
SODIUM SERPL-SCNC: 140 MMOL/L (ref 136–145)
WBC NRBC COR # BLD: 3.3 10*3/MM3 (ref 3.4–10.8)

## 2022-01-31 PROCEDURE — 63710000001 DEXAMETHASONE PER 0.25 MG: Performed by: NURSE PRACTITIONER

## 2022-01-31 PROCEDURE — 94799 UNLISTED PULMONARY SVC/PX: CPT

## 2022-01-31 PROCEDURE — 83615 LACTATE (LD) (LDH) ENZYME: CPT | Performed by: NURSE PRACTITIONER

## 2022-01-31 PROCEDURE — 80053 COMPREHEN METABOLIC PANEL: CPT | Performed by: NURSE PRACTITIONER

## 2022-01-31 PROCEDURE — 82728 ASSAY OF FERRITIN: CPT | Performed by: NURSE PRACTITIONER

## 2022-01-31 PROCEDURE — 86140 C-REACTIVE PROTEIN: CPT | Performed by: NURSE PRACTITIONER

## 2022-01-31 PROCEDURE — 85379 FIBRIN DEGRADATION QUANT: CPT | Performed by: NURSE PRACTITIONER

## 2022-01-31 PROCEDURE — 82962 GLUCOSE BLOOD TEST: CPT

## 2022-01-31 PROCEDURE — 84145 PROCALCITONIN (PCT): CPT | Performed by: NURSE PRACTITIONER

## 2022-01-31 PROCEDURE — 85025 COMPLETE CBC W/AUTO DIFF WBC: CPT | Performed by: NURSE PRACTITIONER

## 2022-01-31 PROCEDURE — 94760 N-INVAS EAR/PLS OXIMETRY 1: CPT

## 2022-01-31 PROCEDURE — 25010000002 ENOXAPARIN PER 10 MG: Performed by: NURSE PRACTITIONER

## 2022-01-31 PROCEDURE — 63710000001 INSULIN ASPART PER 5 UNITS: Performed by: NURSE PRACTITIONER

## 2022-01-31 RX ORDER — DEXAMETHASONE SODIUM PHOSPHATE 4 MG/ML
6 INJECTION, SOLUTION INTRA-ARTICULAR; INTRALESIONAL; INTRAMUSCULAR; INTRAVENOUS; SOFT TISSUE EVERY 12 HOURS SCHEDULED
Status: DISCONTINUED | OUTPATIENT
Start: 2022-01-31 | End: 2022-02-10 | Stop reason: HOSPADM

## 2022-01-31 RX ADMIN — LOSARTAN POTASSIUM 25 MG: 25 TABLET, FILM COATED ORAL at 09:04

## 2022-01-31 RX ADMIN — ENOXAPARIN SODIUM 110 MG: 120 INJECTION SUBCUTANEOUS at 17:20

## 2022-01-31 RX ADMIN — DEXAMETHASONE 6 MG: 2 TABLET ORAL at 09:04

## 2022-01-31 RX ADMIN — ALBUTEROL SULFATE 2 PUFF: 90 AEROSOL, METERED RESPIRATORY (INHALATION) at 14:22

## 2022-01-31 RX ADMIN — ALBUTEROL SULFATE 2 PUFF: 90 AEROSOL, METERED RESPIRATORY (INHALATION) at 07:48

## 2022-01-31 RX ADMIN — SODIUM CHLORIDE, PRESERVATIVE FREE 10 ML: 5 INJECTION INTRAVENOUS at 09:04

## 2022-01-31 RX ADMIN — ALBUTEROL SULFATE 2 PUFF: 90 AEROSOL, METERED RESPIRATORY (INHALATION) at 10:18

## 2022-01-31 RX ADMIN — INSULIN ASPART 6 UNITS: 100 INJECTION, SOLUTION INTRAVENOUS; SUBCUTANEOUS at 12:20

## 2022-01-31 RX ADMIN — IPRATROPIUM BROMIDE 2 PUFF: 17 AEROSOL, METERED RESPIRATORY (INHALATION) at 07:48

## 2022-01-31 RX ADMIN — DEXAMETHASONE 6 MG: 2 TABLET ORAL at 22:02

## 2022-01-31 RX ADMIN — ALBUTEROL SULFATE 2 PUFF: 90 AEROSOL, METERED RESPIRATORY (INHALATION) at 19:07

## 2022-01-31 RX ADMIN — BUDESONIDE AND FORMOTEROL FUMARATE DIHYDRATE 2 PUFF: 160; 4.5 AEROSOL RESPIRATORY (INHALATION) at 07:47

## 2022-01-31 RX ADMIN — IPRATROPIUM BROMIDE 2 PUFF: 17 AEROSOL, METERED RESPIRATORY (INHALATION) at 10:19

## 2022-01-31 RX ADMIN — INSULIN ASPART 4 UNITS: 100 INJECTION, SOLUTION INTRAVENOUS; SUBCUTANEOUS at 17:23

## 2022-01-31 RX ADMIN — BUDESONIDE AND FORMOTEROL FUMARATE DIHYDRATE 2 PUFF: 160; 4.5 AEROSOL RESPIRATORY (INHALATION) at 19:07

## 2022-01-31 RX ADMIN — CANAGLIFLOZIN 300 MG: 300 TABLET, FILM COATED ORAL at 09:04

## 2022-01-31 RX ADMIN — IPRATROPIUM BROMIDE 2 PUFF: 17 AEROSOL, METERED RESPIRATORY (INHALATION) at 14:22

## 2022-01-31 RX ADMIN — IPRATROPIUM BROMIDE 2 PUFF: 17 AEROSOL, METERED RESPIRATORY (INHALATION) at 19:07

## 2022-01-31 RX ADMIN — ATORVASTATIN CALCIUM 40 MG: 40 TABLET, FILM COATED ORAL at 22:03

## 2022-01-31 RX ADMIN — INSULIN ASPART 6 UNITS: 100 INJECTION, SOLUTION INTRAVENOUS; SUBCUTANEOUS at 08:30

## 2022-01-31 RX ADMIN — SODIUM CHLORIDE, PRESERVATIVE FREE 10 ML: 5 INJECTION INTRAVENOUS at 22:07

## 2022-01-31 RX ADMIN — BARICITINIB 4 MG: 2 TABLET, FILM COATED ORAL at 17:20

## 2022-02-01 LAB
ALBUMIN SERPL-MCNC: 3.7 G/DL (ref 3.5–5.2)
ALBUMIN/GLOB SERPL: 1.1 G/DL
ALP SERPL-CCNC: 98 U/L (ref 39–117)
ALT SERPL W P-5'-P-CCNC: 34 U/L (ref 1–41)
ANION GAP SERPL CALCULATED.3IONS-SCNC: 14 MMOL/L (ref 5–15)
AST SERPL-CCNC: 79 U/L (ref 1–40)
BASOPHILS # BLD AUTO: 0.01 10*3/MM3 (ref 0–0.2)
BASOPHILS NFR BLD AUTO: 0.2 % (ref 0–1.5)
BILIRUB SERPL-MCNC: 0.4 MG/DL (ref 0–1.2)
BUN SERPL-MCNC: 36 MG/DL (ref 6–20)
BUN/CREAT SERPL: 34.6 (ref 7–25)
CALCIUM SPEC-SCNC: 8.8 MG/DL (ref 8.6–10.5)
CHLORIDE SERPL-SCNC: 100 MMOL/L (ref 98–107)
CO2 SERPL-SCNC: 25 MMOL/L (ref 22–29)
CREAT SERPL-MCNC: 1.04 MG/DL (ref 0.76–1.27)
CRP SERPL-MCNC: 1.37 MG/DL (ref 0–0.5)
DEPRECATED RDW RBC AUTO: 43.9 FL (ref 37–54)
EOSINOPHIL # BLD AUTO: 0 10*3/MM3 (ref 0–0.4)
EOSINOPHIL NFR BLD AUTO: 0 % (ref 0.3–6.2)
ERYTHROCYTE [DISTWIDTH] IN BLOOD BY AUTOMATED COUNT: 13.5 % (ref 12.3–15.4)
FERRITIN SERPL-MCNC: 2587 NG/ML (ref 30–400)
GFR SERPL CREATININE-BSD FRML MDRD: 74 ML/MIN/1.73
GLOBULIN UR ELPH-MCNC: 3.4 GM/DL
GLUCOSE BLDC GLUCOMTR-MCNC: 214 MG/DL (ref 70–130)
GLUCOSE BLDC GLUCOMTR-MCNC: 225 MG/DL (ref 70–130)
GLUCOSE BLDC GLUCOMTR-MCNC: 226 MG/DL (ref 70–130)
GLUCOSE BLDC GLUCOMTR-MCNC: 258 MG/DL (ref 70–130)
GLUCOSE SERPL-MCNC: 247 MG/DL (ref 65–99)
HCT VFR BLD AUTO: 44.2 % (ref 37.5–51)
HGB BLD-MCNC: 15.1 G/DL (ref 13–17.7)
HOLD SPECIMEN: NORMAL
IMM GRANULOCYTES # BLD AUTO: 0.03 10*3/MM3 (ref 0–0.05)
IMM GRANULOCYTES NFR BLD AUTO: 0.6 % (ref 0–0.5)
LYMPHOCYTES # BLD AUTO: 0.56 10*3/MM3 (ref 0.7–3.1)
LYMPHOCYTES NFR BLD AUTO: 10.8 % (ref 19.6–45.3)
MCH RBC QN AUTO: 30.1 PG (ref 26.6–33)
MCHC RBC AUTO-ENTMCNC: 34.2 G/DL (ref 31.5–35.7)
MCV RBC AUTO: 88.2 FL (ref 79–97)
MONOCYTES # BLD AUTO: 0.33 10*3/MM3 (ref 0.1–0.9)
MONOCYTES NFR BLD AUTO: 6.4 % (ref 5–12)
NEUTROPHILS NFR BLD AUTO: 4.24 10*3/MM3 (ref 1.7–7)
NEUTROPHILS NFR BLD AUTO: 82 % (ref 42.7–76)
NRBC BLD AUTO-RTO: 0 /100 WBC (ref 0–0.2)
PLATELET # BLD AUTO: 132 10*3/MM3 (ref 140–450)
PMV BLD AUTO: 10.8 FL (ref 6–12)
POTASSIUM SERPL-SCNC: 4.4 MMOL/L (ref 3.5–5.2)
PROT SERPL-MCNC: 7.1 G/DL (ref 6–8.5)
RBC # BLD AUTO: 5.01 10*6/MM3 (ref 4.14–5.8)
SODIUM SERPL-SCNC: 139 MMOL/L (ref 136–145)
WBC NRBC COR # BLD: 5.17 10*3/MM3 (ref 3.4–10.8)

## 2022-02-01 PROCEDURE — 86140 C-REACTIVE PROTEIN: CPT | Performed by: NURSE PRACTITIONER

## 2022-02-01 PROCEDURE — 94799 UNLISTED PULMONARY SVC/PX: CPT

## 2022-02-01 PROCEDURE — 82728 ASSAY OF FERRITIN: CPT | Performed by: NURSE PRACTITIONER

## 2022-02-01 PROCEDURE — 82962 GLUCOSE BLOOD TEST: CPT

## 2022-02-01 PROCEDURE — 80053 COMPREHEN METABOLIC PANEL: CPT | Performed by: NURSE PRACTITIONER

## 2022-02-01 PROCEDURE — 85025 COMPLETE CBC W/AUTO DIFF WBC: CPT | Performed by: NURSE PRACTITIONER

## 2022-02-01 PROCEDURE — 97162 PT EVAL MOD COMPLEX 30 MIN: CPT

## 2022-02-01 PROCEDURE — 25010000002 ENOXAPARIN PER 10 MG: Performed by: NURSE PRACTITIONER

## 2022-02-01 PROCEDURE — 36415 COLL VENOUS BLD VENIPUNCTURE: CPT | Performed by: NURSE PRACTITIONER

## 2022-02-01 PROCEDURE — 63710000001 DEXAMETHASONE PER 0.25 MG: Performed by: NURSE PRACTITIONER

## 2022-02-01 PROCEDURE — 94760 N-INVAS EAR/PLS OXIMETRY 1: CPT

## 2022-02-01 PROCEDURE — 63710000001 INSULIN ASPART PER 5 UNITS: Performed by: NURSE PRACTITIONER

## 2022-02-01 RX ADMIN — ALBUTEROL SULFATE 2 PUFF: 90 AEROSOL, METERED RESPIRATORY (INHALATION) at 07:26

## 2022-02-01 RX ADMIN — IPRATROPIUM BROMIDE 2 PUFF: 17 AEROSOL, METERED RESPIRATORY (INHALATION) at 07:27

## 2022-02-01 RX ADMIN — DEXAMETHASONE 6 MG: 2 TABLET ORAL at 08:11

## 2022-02-01 RX ADMIN — IPRATROPIUM BROMIDE 2 PUFF: 17 AEROSOL, METERED RESPIRATORY (INHALATION) at 11:05

## 2022-02-01 RX ADMIN — BUDESONIDE AND FORMOTEROL FUMARATE DIHYDRATE 2 PUFF: 160; 4.5 AEROSOL RESPIRATORY (INHALATION) at 07:26

## 2022-02-01 RX ADMIN — ALBUTEROL SULFATE 2 PUFF: 90 AEROSOL, METERED RESPIRATORY (INHALATION) at 19:20

## 2022-02-01 RX ADMIN — INSULIN ASPART 4 UNITS: 100 INJECTION, SOLUTION INTRAVENOUS; SUBCUTANEOUS at 08:14

## 2022-02-01 RX ADMIN — DEXAMETHASONE 6 MG: 2 TABLET ORAL at 20:59

## 2022-02-01 RX ADMIN — BUDESONIDE AND FORMOTEROL FUMARATE DIHYDRATE 2 PUFF: 160; 4.5 AEROSOL RESPIRATORY (INHALATION) at 19:20

## 2022-02-01 RX ADMIN — SODIUM CHLORIDE, PRESERVATIVE FREE 10 ML: 5 INJECTION INTRAVENOUS at 08:14

## 2022-02-01 RX ADMIN — ENOXAPARIN SODIUM 110 MG: 120 INJECTION SUBCUTANEOUS at 04:50

## 2022-02-01 RX ADMIN — IPRATROPIUM BROMIDE 2 PUFF: 17 AEROSOL, METERED RESPIRATORY (INHALATION) at 15:06

## 2022-02-01 RX ADMIN — SODIUM CHLORIDE, PRESERVATIVE FREE 10 ML: 5 INJECTION INTRAVENOUS at 21:00

## 2022-02-01 RX ADMIN — ALBUTEROL SULFATE 2 PUFF: 90 AEROSOL, METERED RESPIRATORY (INHALATION) at 15:06

## 2022-02-01 RX ADMIN — ENOXAPARIN SODIUM 110 MG: 120 INJECTION SUBCUTANEOUS at 15:15

## 2022-02-01 RX ADMIN — INSULIN ASPART 6 UNITS: 100 INJECTION, SOLUTION INTRAVENOUS; SUBCUTANEOUS at 11:39

## 2022-02-01 RX ADMIN — ATORVASTATIN CALCIUM 40 MG: 40 TABLET, FILM COATED ORAL at 20:59

## 2022-02-01 RX ADMIN — ALBUTEROL SULFATE 2 PUFF: 90 AEROSOL, METERED RESPIRATORY (INHALATION) at 11:05

## 2022-02-01 RX ADMIN — BARICITINIB 4 MG: 2 TABLET, FILM COATED ORAL at 08:11

## 2022-02-01 RX ADMIN — LOSARTAN POTASSIUM 25 MG: 25 TABLET, FILM COATED ORAL at 08:11

## 2022-02-01 RX ADMIN — IPRATROPIUM BROMIDE 2 PUFF: 17 AEROSOL, METERED RESPIRATORY (INHALATION) at 19:20

## 2022-02-01 RX ADMIN — CANAGLIFLOZIN 300 MG: 300 TABLET, FILM COATED ORAL at 08:11

## 2022-02-01 RX ADMIN — INSULIN ASPART 4 UNITS: 100 INJECTION, SOLUTION INTRAVENOUS; SUBCUTANEOUS at 17:20

## 2022-02-01 NOTE — PLAN OF CARE
Goal Outcome Evaluation:  Plan of Care Reviewed With: patient            Pt on airvo at 55 at this time. No s/s of distress or complaints voiced. VS stable, will wean 02 as tolerated.

## 2022-02-01 NOTE — PROGRESS NOTES
"    [facility] Medicine Services  INPATIENT PROGRESS NOTE    Length of Stay: 2  Date of Admission: 1/30/2022  Primary Care Physician: Bartolo Diaz APRN    Subjective   Chief Complaint: \"feeling much better today\"   HPI:  56 year old male with past medical history of Type 2 DM, HTN, HLD who presented on 1/30/22 with complaints of diarrhea and shortness of breath.  He is currently admitted for acute hypoxic respiratory failure related to covid 19 viral pneumonia. During today's visit, he reports feeling much better today in regards to his breathing.  He reports being able to prone intermittently overnight and feels that it has held.     Review of Systems   Constitutional: Positive for fatigue. Negative for chills and fever.   HENT: Negative for congestion, rhinorrhea and sore throat.    Respiratory: Positive for shortness of breath. Negative for cough, chest tightness and wheezing.    Cardiovascular: Negative for chest pain, palpitations and leg swelling.   Gastrointestinal: Negative for abdominal pain, constipation, diarrhea, nausea and vomiting.   Musculoskeletal: Negative for back pain and neck pain.   Skin: Negative for pallor.   Neurological: Negative for dizziness, weakness and headaches.   Psychiatric/Behavioral: Negative for confusion. The patient is not nervous/anxious.         All pertinent negatives and positives are as above. All other systems have been reviewed and are negative unless otherwise stated.     Objective    Temp:  [97.7 °F (36.5 °C)-98.2 °F (36.8 °C)] 98.2 °F (36.8 °C)  Heart Rate:  [75-97] 92  Resp:  [18-22] 22  BP: (108-141)/(60-76) 116/66    Physical Exam  Vitals and nursing note reviewed.   Constitutional:       General: He is not in acute distress.     Appearance: Normal appearance. He is ill-appearing.   HENT:      Head: Normocephalic and atraumatic.      Right Ear: External ear normal.      Left Ear: External ear normal.      Nose: Nose " normal.      Mouth/Throat:      Mouth: Mucous membranes are moist.      Pharynx: Oropharynx is clear.   Eyes:      General: No scleral icterus.        Right eye: No discharge.         Left eye: No discharge.      Conjunctiva/sclera: Conjunctivae normal.   Cardiovascular:      Rate and Rhythm: Normal rate and regular rhythm.      Pulses: Normal pulses.      Heart sounds: Normal heart sounds. No murmur heard.  No friction rub. No gallop.    Pulmonary:      Effort: Pulmonary effort is normal. No respiratory distress.      Breath sounds: Normal breath sounds. No stridor. No wheezing, rhonchi or rales.   Abdominal:      General: Bowel sounds are normal. There is no distension.      Palpations: Abdomen is soft.      Tenderness: There is no abdominal tenderness.   Musculoskeletal:         General: No swelling. Normal range of motion.      Cervical back: Normal range of motion and neck supple.   Skin:     General: Skin is warm and dry.   Neurological:      General: No focal deficit present.      Mental Status: He is alert and oriented to person, place, and time.   Psychiatric:         Mood and Affect: Mood normal.         Behavior: Behavior normal.             Results Review:  I have reviewed the labs, radiology results, and diagnostic studies.    Laboratory Data:   Results from last 7 days   Lab Units 02/01/22  0609 01/31/22  0805 01/30/22  1050   SODIUM mmol/L 139 140 141   POTASSIUM mmol/L 4.4 3.9 3.9   CHLORIDE mmol/L 100 101 99   CO2 mmol/L 25.0 23.0 28.0   BUN mg/dL 36* 32* 20   CREATININE mg/dL 1.04 1.03 1.20   GLUCOSE mg/dL 247* 278* 203*   CALCIUM mg/dL 8.8 9.0 9.3   BILIRUBIN mg/dL 0.4 0.3 0.4   ALK PHOS U/L 98 91 98   ALT (SGPT) U/L 34 29 33   AST (SGOT) U/L 79* 60* 76*   ANION GAP mmol/L 14.0 16.0* 14.0     Estimated Creatinine Clearance: 105 mL/min (by C-G formula based on SCr of 1.04 mg/dL).          Results from last 7 days   Lab Units 02/01/22  0609 01/31/22  0805 01/30/22  1141   WBC 10*3/mm3 5.17 3.30*  2.33*   HEMOGLOBIN g/dL 15.1 15.1 15.0   HEMATOCRIT % 44.2 44.2 43.6   PLATELETS 10*3/mm3 132* 103* 80*           Culture Data:   Blood Culture   Date Value Ref Range Status   01/30/2022 No growth at 24 hours  Preliminary   01/30/2022 No growth at 24 hours  Preliminary     No results found for: URINECX  No results found for: RESPCX  No results found for: WOUNDCX  No results found for: STOOLCX  No components found for: BODYFLD    Radiology Data:   Imaging Results (Last 24 Hours)     ** No results found for the last 24 hours. **          I have reviewed the patient's current medications.     Assessment/Plan     Active Hospital Problems    Diagnosis    • Acute respiratory failure with hypoxia (HCC)    • Pneumonia due to COVID-19 virus    • COVID-19 virus detected        Plan:    1. Acute hypoxic respiratory failure related to covid 19 viral pneumonia: continue o2 support (currently on 50 liters Airvo).  Continue decadron, Baricitinib, Albuterol, Atrovent, Symbicort, Lovenox.  Trend covid monitoring labs.  Patient is outside treatment window for use of Remdesivir as he first fell ill three weeks ago.  CTA rules out PE.    2. HTN: continue home dose of Losartan.  3. Type 2 DM: FSBS AC and HS with SSI, continue Jardiance.   4. HLD: continue statin.     I confirmed that the patient's Advance Care Plan is present, code status is documented, or surrogate decision maker is listed in the patient's medical record.          This document has been electronically signed by FLORENCE Barrios on February 1, 2022 12:40 CST

## 2022-02-01 NOTE — PLAN OF CARE
Goal Outcome Evaluation:  Plan of Care Reviewed With: patient        Progress: no change  Patient received from ER awake alert and oriented V/S taken stable. Remains on Airvo at 45L, tolerating well. Denies sob. Up to bathroom with assistance voiding QS. Offers no complaints of pain or discomfort. Laid in prone position for few hours tolerated well. Condition remains unchanged. Will continue to monitor.

## 2022-02-01 NOTE — PLAN OF CARE
Goal Outcome Evaluation:  Plan of Care Reviewed With: patient           Outcome Summary: Initial PT evaluation complete.  Patient is alert and very cooperative.  He demonstrates (I) with bed mobility, transfers and gait, ambulating 5'x2 without AD, sidestepping L and R.  SpO2 100% on AirVo2 at rest, drops to low 80's with standing.  Patient denies dizziness, lightheadedness or dyspnea.  RN notified.  Goals established to increase activity tolerance, continue skilled I/P PT.

## 2022-02-01 NOTE — THERAPY EVALUATION
Patient Name: Louis Brooks  : 1965    MRN: [id number]                              Today's Date: 2022       Admit Date: 2022    Visit Dx:     ICD-10-CM ICD-9-CM   1. Pneumonia of both lungs due to infectious organism, unspecified part of lung  J18.9 483.8   2. Acute respiratory failure with hypoxia ([facility])  J96.01 518.81   3. Pneumonia due to COVID-19 virus  U07.1 480.8    J12.82 079.89   4. Impaired functional mobility, balance, gait, and endurance  Z74.09 V49.89     Patient Active Problem List   Diagnosis   • Type 2 diabetes mellitus without complication, without long-term current use of insulin ([facility])   • Elevated BP without diagnosis of hypertension   • Mixed hyperlipidemia   • Class 2 severe obesity due to excess calories with serious comorbidity and body mass index (BMI) of 36.0 to 36.9 in adult ([facility])   • Gastroesophageal reflux disease without esophagitis   • Essential hypertension   • Acute respiratory failure with hypoxia ([facility])   • Pneumonia due to COVID-19 virus   • COVID-19 virus detected     Past Medical History:   Diagnosis Date   • Arthritis    • Class 2 severe obesity due to excess calories with serious comorbidity and body mass index (BMI) of 36.0 to 36.9 in adult ([facility]) 2021   • GERD (gastroesophageal reflux disease)    • Hypertension    • Mixed hyperlipidemia 2021   • Pneumonia due to COVID-19 virus 2022   • Type 2 diabetes mellitus without complication, without long-term current use of insulin ([facility]) 2021     History reviewed. No pertinent surgical history.   General Information     Row Name 22 1425          Physical Therapy Time and Intention    Document Type evaluation  -CZ     Mode of Treatment individual therapy; physical therapy  -CZ     Row Name 22 1425          General Information    Patient Profile Reviewed yes  -CZ     Prior Level of Function independent:; all household mobility; community mobility  -CZ     Existing Precautions/Restrictions  oxygen therapy device and L/min  -CZ     Row Name 02/01/22 1425          Living Environment    Lives With spouse; child(brando), dependent  -CZ     Row Name 02/01/22 1425          Stairs Within Home, Primary    Stairs, Within Home, Primary (I) without an AD, no DME, no home O2.  -CZ     Row Name 02/01/22 1425          Cognition    Orientation Status (Cognition) oriented x 4  -CZ     Row Name 02/01/22 1425          Safety Issues, Functional Mobility    Impairments Affecting Function (Mobility) shortness of breath; endurance/activity tolerance  -CZ           User Key  (r) = Recorded By, (t) = Taken By, (c) = Cosigned By    Initials Name Provider Type    CZ Teto Storm, PT Physical Therapist               Mobility     Row Name 02/01/22 1425          Bed Mobility    Bed Mobility supine-sit; sit-supine  -CZ     Supine-Sit [place] (Bed Mobility) modified independence  -CZ     Sit-Supine [place] (Bed Mobility) modified independence  -CZ     Assistive Device (Bed Mobility) head of bed elevated  -CZ     Row Name 02/01/22 1425          Sit-Stand Transfer    Sit-Stand [place] (Transfers) independent  -CZ     Row Name 02/01/22 1425          Gait/Stairs (Locomotion)    [place] Level (Gait) independent  -CZ     Distance in Feet (Gait) 5'x1, sidestepping L and R.  -CZ     Comment (Gait/Stairs) No AD, no LOB, but hypoxic.  -CZ           User Key  (r) = Recorded By, (t) = Taken By, (c) = Cosigned By    Initials Name Provider Type    CZ Teto Storm, PT Physical Therapist               Obj/Interventions     Row Name 02/01/22 1425          Range of Motion Comprehensive    General Range of Motion bilateral lower extremity ROM WFL  -CZ     [facility] Name 02/01/22 1425          Strength Comprehensive (MMT)    General Manual Muscle Testing (MMT) Assessment no strength deficits identified  -CZ     Row Name 02/01/22 1425          Sensory Assessment (Somatosensory)    Sensory Assessment (Somatosensory) LE sensation intact   -CZ           User Key  (r) = Recorded By, (t) = Taken By, (c) = Cosigned By    Initials Name Provider Type    CZ Teto Storm, PT Physical Therapist               Goals/Plan     Row Name 02/01/22 1425          Bed Mobility Goal 1 (PT)    Activity/Assistive Device (Bed Mobility Goal 1, PT) sit to supine/supine to sit  -CZ     [place] Level/Cues Needed (Bed Mobility Goal 1, PT) independent  -CZ     Time Frame (Bed Mobility Goal 1, PT) by discharge  -CZ     Strategies/Barriers (Bed Mobility Goal 1, PT) HOB flat, no bed rails.  -CZ     Progress/Outcomes (Bed Mobility Goal 1, PT) goal not met  -CZ     Row Name 02/01/22 1425          Gait Training Goal 1 (PT)    Activity/Assistive Device (Gait Training Goal 1, PT) gait (walking locomotion)  -CZ     [place] Level (Gait Training Goal 1, PT) independent  -CZ     Distance (Gait Training Goal 1, PT) 35'x3.  -CZ     Time Frame (Gait Training Goal 1, PT) by discharge  -CZ     Strategies/Barriers (Gait Training Goal 1, PT) Maintain SpO2 >90%.  -CZ     Progress/Outcome (Gait Training Goal 1, PT) goal not met  -CZ     Row Name 02/01/22 1425          Problem Specific Goal 1 (PT)    Problem Specific Goal 1 (PT) Score 28/28 on Tinetti fall risk assessment.  -CZ     Time Frame (Problem Specific Goal 1, PT) by discharge  -CZ     Strategies/Barriers (Problem Specific Goal 1, PT) Maintain SpO2 >90%.  -CZ     Progress/Outcome (Problem Specific Goal 1, PT) goal not met  -CZ           User Key  (r) = Recorded By, (t) = Taken By, (c) = Cosigned By    Initials Name Provider Type    CZ Teto Storm, PT Physical Therapist               Clinical Impression     Row Name 02/01/22 1425          Pain    Additional Documentation Pain Scale: Numbers Pre/Post-Treatment (Group)  -     Row Name 02/01/22 1425          Pain Scale: Numbers Pre/Post-Treatment    Pretreatment Pain Rating 0/10 - no pain  -CZ     Posttreatment Pain Rating 0/10 - no pain  -     Row Name 02/01/22 1420           Plan of Care Review    Plan of Care Reviewed With patient  -CZ     Outcome Summary Initial PT evaluation complete.  Patient is alert and very cooperative.  He demonstrates (I) with bed mobility, transfers and gait, ambulating 5'x2 without AD, sidestepping L and R.  SpO2 100% on AirVo2 at rest, drops to low 80's with standing.  Patient denies dizziness, lightheadedness or dyspnea.  RN notified.  Goals established to increase activity tolerance, continue skilled I/P PT.  -     Row Name 02/01/22 1425          Therapy Assessment/Plan (PT)    Criteria for Skilled Interventions Met (PT) yes; skilled treatment is necessary  -     Row Name 02/01/22 1425          Vital Signs    Pre Systolic BP Rehab 117  -CZ     Pre Treatment Diastolic BP 68  -CZ     Pretreatment Heart Rate (beats/min) 93  -CZ     Intratreatment Heart Rate (beats/min) 96  -CZ     Pre SpO2 (%) 100  -CZ     O2 Delivery Pre Treatment hi-flow  AirVo2  -CZ     Intra SpO2 (%) 83  -CZ     O2 Delivery Intra Treatment hi-flow  -CZ     Post SpO2 (%) 90  -CZ     O2 Delivery Post Treatment hi-flow  -CZ     Pre Patient Position Supine  -CZ     Intra Patient Position Sitting  -CZ     Post Patient Position Supine  -CZ     Row Name 02/01/22 1425          Positioning and Restraints    Pre-Treatment Position in bed  -CZ     Post Treatment Position bed  -CZ     In Bed supine; call light within reach  -CZ           User Key  (r) = Recorded By, (t) = Taken By, (c) = Cosigned By    Initials Name Provider Type    CZ Teto Storm, PT Physical Therapist               Outcome Measures     Row Name 02/01/22 4860          How much help from another person do you currently need...    Turning from your back to your side while in flat bed without using bedrails? 4  -CZ     Moving from lying on back to sitting on the side of a flat bed without bedrails? 4  -CZ     Moving to and from a bed to a chair (including a wheelchair)? 4  -CZ     Standing up from a chair using your arms  (e.g., wheelchair, bedside chair)? 4  -CZ     Climbing 3-5 steps with a railing? 3  -CZ     To walk in hospital room? 4  -CZ     AM-PAC 6 Clicks Score (PT) 23  -CZ     Row Name 02/01/22 1530          Functional Assessment    Outcome Measure Options AM-PAC 6 Clicks Basic Mobility (PT); [place]  -           User Key  (r) = Recorded By, (t) = Taken By, (c) = Cosigned By    Initials Name Provider Type    CZ Teto Storm, PT Physical Therapist                             Physical Therapy Education                 Title: PT OT SLP Therapies (In Progress)     Topic: Physical Therapy (In Progress)     Point: Mobility training (Done)     Learning Progress Summary           Patient Acceptance, E, VU by  at 2/1/2022 3197    Comment: PT POC, goals, breathing technique.                   Point: Home exercise program (Not Started)     Learner Progress:  Not documented in this visit.          Point: Body mechanics (Not Started)     Learner Progress:  Not documented in this visit.          Point: Precautions (Not Started)     Learner Progress:  Not documented in this visit.                      User Key     Initials Effective Dates Name Provider Type Discipline     06/16/21 -  Teto Storm, PT Physical Therapist PT              PT Recommendation and Plan  Planned Therapy Interventions (PT): bed mobility training, gait training, patient/family education, strengthening, stretching  Plan of Care Reviewed With: patient  Outcome Summary: Initial PT evaluation complete.  Patient is alert and very cooperative.  He demonstrates (I) with bed mobility, transfers and gait, ambulating 5'x2 without AD, sidestepping L and R.  SpO2 100% on AirVo2 at rest, drops to low 80's with standing.  Patient denies dizziness, lightheadedness or dyspnea.  RN notified.  Goals established to increase activity tolerance, continue skilled I/P PT.     Time Calculation:    PT Charges     Row Name 02/01/22 0380             Time Calculation    Start  Time 1425  -CZ      Stop Time 1452  -CZ      Time Calculation (min) 27 min  -CZ      PT Received On 02/01/22  -CZ      PT Goal Re-Cert Due Date 02/14/22  -CZ            User Key  (r) = Recorded By, (t) = Taken By, (c) = Cosigned By    Initials Name Provider Type    CZ Teto Storm, PT Physical Therapist              Therapy Charges for Today     Code Description Service Date Service Provider Modifiers Qty    [id number] HC PT EVAL MOD COMPLEXITY 2 2/1/2022 Teto Storm, PT GP 1          PT G-Codes  Outcome Measure Options: AM-PAC 6 Clicks Basic Mobility (PT), [place]  AM-PAC 6 Clicks Score (PT): 23    Teto Storm, SUZAN  2/1/2022

## 2022-02-02 LAB
ALBUMIN SERPL-MCNC: 3.9 G/DL (ref 3.5–5.2)
ALBUMIN/GLOB SERPL: 1.2 G/DL
ALP SERPL-CCNC: 106 U/L (ref 39–117)
ALT SERPL W P-5'-P-CCNC: 35 U/L (ref 1–41)
ANION GAP SERPL CALCULATED.3IONS-SCNC: 15 MMOL/L (ref 5–15)
AST SERPL-CCNC: 73 U/L (ref 1–40)
BASOPHILS # BLD AUTO: 0 10*3/MM3 (ref 0–0.2)
BASOPHILS NFR BLD AUTO: 0 % (ref 0–1.5)
BILIRUB SERPL-MCNC: 0.5 MG/DL (ref 0–1.2)
BUN SERPL-MCNC: 36 MG/DL (ref 6–20)
BUN/CREAT SERPL: 41.4 (ref 7–25)
CALCIUM SPEC-SCNC: 9.2 MG/DL (ref 8.6–10.5)
CHLORIDE SERPL-SCNC: 102 MMOL/L (ref 98–107)
CO2 SERPL-SCNC: 23 MMOL/L (ref 22–29)
CREAT SERPL-MCNC: 0.87 MG/DL (ref 0.76–1.27)
CRP SERPL-MCNC: 1.05 MG/DL (ref 0–0.5)
D-DIMER, QUANTITATIVE (MAD,POW, STR): 331 NG/ML (FEU) (ref 0–470)
DEPRECATED RDW RBC AUTO: 43.7 FL (ref 37–54)
EOSINOPHIL # BLD AUTO: 0 10*3/MM3 (ref 0–0.4)
EOSINOPHIL NFR BLD AUTO: 0 % (ref 0.3–6.2)
ERYTHROCYTE [DISTWIDTH] IN BLOOD BY AUTOMATED COUNT: 13.5 % (ref 12.3–15.4)
FERRITIN SERPL-MCNC: 2480 NG/ML (ref 30–400)
GFR SERPL CREATININE-BSD FRML MDRD: 91 ML/MIN/1.73
GLOBULIN UR ELPH-MCNC: 3.2 GM/DL
GLUCOSE BLDC GLUCOMTR-MCNC: 218 MG/DL (ref 70–130)
GLUCOSE BLDC GLUCOMTR-MCNC: 236 MG/DL (ref 70–130)
GLUCOSE BLDC GLUCOMTR-MCNC: 274 MG/DL (ref 70–130)
GLUCOSE SERPL-MCNC: 276 MG/DL (ref 65–99)
HCT VFR BLD AUTO: 44.4 % (ref 37.5–51)
HGB BLD-MCNC: 15 G/DL (ref 13–17.7)
IMM GRANULOCYTES # BLD AUTO: 0.02 10*3/MM3 (ref 0–0.05)
IMM GRANULOCYTES NFR BLD AUTO: 0.4 % (ref 0–0.5)
LDH SERPL-CCNC: 865 U/L (ref 135–225)
LYMPHOCYTES # BLD AUTO: 0.49 10*3/MM3 (ref 0.7–3.1)
LYMPHOCYTES NFR BLD AUTO: 9.1 % (ref 19.6–45.3)
MCH RBC QN AUTO: 29.7 PG (ref 26.6–33)
MCHC RBC AUTO-ENTMCNC: 33.8 G/DL (ref 31.5–35.7)
MCV RBC AUTO: 87.9 FL (ref 79–97)
MONOCYTES # BLD AUTO: 0.37 10*3/MM3 (ref 0.1–0.9)
MONOCYTES NFR BLD AUTO: 6.9 % (ref 5–12)
NEUTROPHILS NFR BLD AUTO: 4.48 10*3/MM3 (ref 1.7–7)
NEUTROPHILS NFR BLD AUTO: 83.6 % (ref 42.7–76)
NRBC BLD AUTO-RTO: 0 /100 WBC (ref 0–0.2)
PLATELET # BLD AUTO: 147 10*3/MM3 (ref 140–450)
PMV BLD AUTO: 10.7 FL (ref 6–12)
POTASSIUM SERPL-SCNC: 4.4 MMOL/L (ref 3.5–5.2)
PROCALCITONIN SERPL-MCNC: 0.08 NG/ML (ref 0–0.25)
PROT SERPL-MCNC: 7.1 G/DL (ref 6–8.5)
RBC # BLD AUTO: 5.05 10*6/MM3 (ref 4.14–5.8)
SODIUM SERPL-SCNC: 140 MMOL/L (ref 136–145)
WBC NRBC COR # BLD: 5.36 10*3/MM3 (ref 3.4–10.8)

## 2022-02-02 PROCEDURE — 94799 UNLISTED PULMONARY SVC/PX: CPT

## 2022-02-02 PROCEDURE — 82728 ASSAY OF FERRITIN: CPT | Performed by: NURSE PRACTITIONER

## 2022-02-02 PROCEDURE — 84145 PROCALCITONIN (PCT): CPT | Performed by: NURSE PRACTITIONER

## 2022-02-02 PROCEDURE — 25010000002 ENOXAPARIN PER 10 MG: Performed by: NURSE PRACTITIONER

## 2022-02-02 PROCEDURE — 85379 FIBRIN DEGRADATION QUANT: CPT | Performed by: NURSE PRACTITIONER

## 2022-02-02 PROCEDURE — 63710000001 INSULIN ASPART PER 5 UNITS: Performed by: NURSE PRACTITIONER

## 2022-02-02 PROCEDURE — 80053 COMPREHEN METABOLIC PANEL: CPT | Performed by: NURSE PRACTITIONER

## 2022-02-02 PROCEDURE — 85025 COMPLETE CBC W/AUTO DIFF WBC: CPT | Performed by: NURSE PRACTITIONER

## 2022-02-02 PROCEDURE — 86140 C-REACTIVE PROTEIN: CPT | Performed by: NURSE PRACTITIONER

## 2022-02-02 PROCEDURE — 63710000001 ONDANSETRON PER 8 MG: Performed by: NURSE PRACTITIONER

## 2022-02-02 PROCEDURE — 82962 GLUCOSE BLOOD TEST: CPT

## 2022-02-02 PROCEDURE — 63710000001 DEXAMETHASONE PER 0.25 MG: Performed by: NURSE PRACTITIONER

## 2022-02-02 PROCEDURE — 94760 N-INVAS EAR/PLS OXIMETRY 1: CPT

## 2022-02-02 PROCEDURE — 97530 THERAPEUTIC ACTIVITIES: CPT

## 2022-02-02 PROCEDURE — 83615 LACTATE (LD) (LDH) ENZYME: CPT | Performed by: NURSE PRACTITIONER

## 2022-02-02 RX ADMIN — INSULIN ASPART 4 UNITS: 100 INJECTION, SOLUTION INTRAVENOUS; SUBCUTANEOUS at 08:36

## 2022-02-02 RX ADMIN — DEXAMETHASONE 6 MG: 2 TABLET ORAL at 20:58

## 2022-02-02 RX ADMIN — INSULIN ASPART 6 UNITS: 100 INJECTION, SOLUTION INTRAVENOUS; SUBCUTANEOUS at 11:31

## 2022-02-02 RX ADMIN — ALBUTEROL SULFATE 2 PUFF: 90 AEROSOL, METERED RESPIRATORY (INHALATION) at 19:18

## 2022-02-02 RX ADMIN — ONDANSETRON HYDROCHLORIDE 4 MG: 4 TABLET, FILM COATED ORAL at 03:37

## 2022-02-02 RX ADMIN — ENOXAPARIN SODIUM 110 MG: 120 INJECTION SUBCUTANEOUS at 03:32

## 2022-02-02 RX ADMIN — IPRATROPIUM BROMIDE 2 PUFF: 17 AEROSOL, METERED RESPIRATORY (INHALATION) at 08:18

## 2022-02-02 RX ADMIN — IPRATROPIUM BROMIDE 2 PUFF: 17 AEROSOL, METERED RESPIRATORY (INHALATION) at 17:11

## 2022-02-02 RX ADMIN — SODIUM CHLORIDE, PRESERVATIVE FREE 10 ML: 5 INJECTION INTRAVENOUS at 08:36

## 2022-02-02 RX ADMIN — ALBUTEROL SULFATE 2 PUFF: 90 AEROSOL, METERED RESPIRATORY (INHALATION) at 12:12

## 2022-02-02 RX ADMIN — ALBUTEROL SULFATE 2 PUFF: 90 AEROSOL, METERED RESPIRATORY (INHALATION) at 08:18

## 2022-02-02 RX ADMIN — BUDESONIDE AND FORMOTEROL FUMARATE DIHYDRATE 2 PUFF: 160; 4.5 AEROSOL RESPIRATORY (INHALATION) at 08:18

## 2022-02-02 RX ADMIN — BARICITINIB 4 MG: 2 TABLET, FILM COATED ORAL at 08:35

## 2022-02-02 RX ADMIN — IPRATROPIUM BROMIDE 2 PUFF: 17 AEROSOL, METERED RESPIRATORY (INHALATION) at 19:18

## 2022-02-02 RX ADMIN — SODIUM CHLORIDE, PRESERVATIVE FREE 10 ML: 5 INJECTION INTRAVENOUS at 20:58

## 2022-02-02 RX ADMIN — LOSARTAN POTASSIUM 25 MG: 25 TABLET, FILM COATED ORAL at 08:35

## 2022-02-02 RX ADMIN — BUDESONIDE AND FORMOTEROL FUMARATE DIHYDRATE 2 PUFF: 160; 4.5 AEROSOL RESPIRATORY (INHALATION) at 19:18

## 2022-02-02 RX ADMIN — ENOXAPARIN SODIUM 110 MG: 120 INJECTION SUBCUTANEOUS at 15:25

## 2022-02-02 RX ADMIN — DEXAMETHASONE 6 MG: 2 TABLET ORAL at 08:35

## 2022-02-02 RX ADMIN — INSULIN ASPART 4 UNITS: 100 INJECTION, SOLUTION INTRAVENOUS; SUBCUTANEOUS at 17:10

## 2022-02-02 RX ADMIN — CANAGLIFLOZIN 300 MG: 300 TABLET, FILM COATED ORAL at 08:35

## 2022-02-02 RX ADMIN — ATORVASTATIN CALCIUM 40 MG: 40 TABLET, FILM COATED ORAL at 20:58

## 2022-02-02 RX ADMIN — ALBUTEROL SULFATE 2 PUFF: 90 AEROSOL, METERED RESPIRATORY (INHALATION) at 17:11

## 2022-02-02 RX ADMIN — IPRATROPIUM BROMIDE 2 PUFF: 17 AEROSOL, METERED RESPIRATORY (INHALATION) at 12:12

## 2022-02-02 NOTE — PAYOR COMM NOTE
"    Magda Rodríguez RN [facility]  [phone]     Phone  [phone]     Fax  Cont. Stay Review      Da Falk (56 y.o. Male)             Date of Birth Social Security Number Address Home Phone MRN    1965  [address] [place] [address] [phone] [id number]    [employer] Marital Status             [place]        Admission Date Admission Type Admitting Provider Attending Provider Department, Room/Bed    1/30/22 Emergency Dennis Benson MD [place], Emmanuel [place], MD [facility] [address] [place], 418/1    Discharge Date Discharge Disposition Discharge Destination                         Attending Provider: Dennis Benson MD    Allergies: No Known Allergies    Isolation: Enh Drop/Con   Infection: COVID (confirmed) (01/30/22)   Code Status: CPR   Advance Care Planning Activity    Ht: 182.9 cm (72\")   Wt: 106 kg (233 lb 14.4 oz)    Admission Cmt: None   Principal Problem: None                Active Insurance as of 1/30/2022     Primary Coverage     Payor Plan Insurance Group Employer/Plan Group    GLOBAL CARE LBP GLOBAL CARE      Payor Plan Address Payor Plan Phone Number Payor Plan Fax Number Effective Dates    PO  [phone]  1/1/2021 - None Entered    [place] [id number]       Subscriber Name Subscriber Birth Date Member ID       DA FALK 1965 [id number]                 Emergency Contacts      (Rel.) Home Phone Work Phone Mobile Phone    CAROL FALK (Spouse) [phone] -- --            Vital Signs (last day)     Date/Time Temp Temp src Pulse Resp BP Patient Position SpO2    02/02/22 0927 -- -- 95 -- -- -- --    02/02/22 0818 -- -- 87 20 -- -- 94    02/02/22 0747 97.5 (36.4) Oral 90 20 128/73 -- 92    02/02/22 0500 97.9 (36.6) Oral 86 20 134/76 Lying 91    02/02/22 0331 -- -- 89 -- -- -- 95    02/02/22 0212 -- -- 72 -- -- -- --    " 02/01/22 2311 97.6 (36.4) Oral 86 19 130/77 Lying 94    02/01/22 1930 97.3 (36.3) Oral 82 20 128/67 Lying 93    02/01/22 1920 -- -- 104 18 -- -- 92    02/01/22 1642 -- -- 90 -- -- -- --    02/01/22 1508 -- -- 66 18 -- -- 91    02/01/22 1506 -- -- 67 18 -- -- 92    02/01/22 1436 97.2 (36.2) Oral 68 18 117/68 Sitting 98    02/01/22 1128 -- -- 92 -- -- -- --    02/01/22 1105 -- -- 87 22 -- -- 93    02/01/22 1046 98.2 (36.8) Oral 92 20 116/66 Sitting 92    02/01/22 0809 98.2 (36.8) Oral 86 20 110/60 Lying 93    02/01/22 0730 -- -- 88 18 -- -- 97    02/01/22 0727 -- -- 87 18 -- -- 96    02/01/22 0440 98.2 (36.8) Oral 88 18 118/65 Sitting 93    02/01/22 0315 -- -- -- -- -- -- 90    02/01/22 0305 -- -- 75 -- -- -- --    02/01/22 0053 97.7 (36.5) Oral 79 18 108/62 Lying 94          Oxygen Therapy (last day)     Date/Time SpO2 Device (Oxygen Therapy) Flow (L/min) Oxygen Concentration (%) ETCO2 (mmHg)    02/02/22 0818 94 high-flow nasal cannula; humidified; heated 60 100 --    02/02/22 0747 92 high-flow nasal cannula; humidified; heated 60 -- --    02/02/22 0500 91 high-flow nasal cannula 60 -- --    02/02/22 0331 95 heated; high-flow nasal cannula 60 100 --    02/01/22 2311 94 high-flow nasal cannula 60 -- --    02/01/22 1930 93 heated; high-flow nasal cannula 60 100 --    02/01/22 1920 92 heated; high-flow nasal cannula 60  100  --    02/01/22 1508 91 heated; high-flow nasal cannula 55 93 --    02/01/22 1506 92 heated; high-flow nasal cannula 55 93 --    02/01/22 1436 98 heated; high-flow nasal cannula; humidified -- -- --    02/01/22 1105 93 heated; high-flow nasal cannula 50 91 --    02/01/22 1046 92 -- -- -- --    02/01/22 0809 93 heated; high-flow nasal cannula; humidified 50 -- --    02/01/22 0730 97 heated; high-flow nasal cannula 50 94 --    02/01/22 0727 96 heated; high-flow nasal cannula 50 94 --    02/01/22 0440 93 humidified; high-flow nasal cannula -- -- --    02/01/22 0315 90 heated; high-flow nasal cannula 50  93 --    02/01/22 0053 94 heated; high-flow nasal cannula 45 -- --          Current Facility-Administered Medications   Medication Dose Route Frequency Provider Last Rate Last Admin   • acetaminophen (TYLENOL) tablet 650 mg  650 mg Oral Q4H PRN Socorro Calderon APRN        Or   • acetaminophen (TYLENOL) suppository 650 mg  650 mg Rectal Q4H PRN Socorro Calderon APRN       • albuterol sulfate HFA (PROVENTIL HFA;VENTOLIN HFA;PROAIR HFA) inhaler 2 puff  2 puff Inhalation 4x Daily - RT Socoror Calderon APRN   2 puff at 02/02/22 0818   • atorvastatin (LIPITOR) tablet 40 mg  40 mg Oral Nightly Socorro Calderon APRN   40 mg at 02/01/22 2059   • baricitinib (OLUMIANT) tablet 4 mg  4 mg Oral Daily Socorro Calderon APRN   4 mg at 02/02/22 0835   • benzonatate (TESSALON) capsule 100 mg  100 mg Oral TID PRN Socorro Calderon APRN       • budesonide-formoterol (SYMBICORT) 160-4.5 MCG/ACT inhaler 2 puff  2 puff Inhalation BID - RT Socorro Calderon APRN   2 puff at 02/02/22 0818   • calcium carbonate (TUMS) chewable tablet 500 mg (200 mg elemental)  2 tablet Oral BID PRN Socorro Calderon APRN       • Canagliflozin (INVOKANA) 300 MG tablet tablet 300 mg  300 mg Oral Daily Socorro Calderon APRN   300 mg at 02/02/22 0835   • dexamethasone (DECADRON) tablet 6 mg  6 mg Oral Q12H Socorro Calderon APRN   6 mg at 02/02/22 0835    Or   • dexamethasone (DECADRON) injection 6 mg  6 mg Intravenous Q12H Socorro Calderon APRN       • dextromethorphan polistirex ER (DELSYM) 30 MG/5ML oral suspension 60 mg  60 mg Oral Q12H PRN Socorro Calderon APRN       • dextrose (D50W) (25 g/50 mL) IV injection 25 g  25 g Intravenous Q15 Min PRN Socorro Calderon APRN       • dextrose (GLUTOSE) oral gel 15 g  15 g Oral Q15 Min PRN Socorro Calderon APRN       • enoxaparin (LOVENOX) syringe 110 mg  1 mg/kg Subcutaneous Q12H Socorro Calderon APRN   110 mg at 02/02/22 0332   • glucagon (human recombinant) (GLUCAGEN DIAGNOSTIC) injection 1 mg  1 mg Subcutaneous Q15  "Min PRN Socorro Calderon APRN       • insulin aspart (novoLOG) injection 0-9 Units  0-9 Units Subcutaneous TID AC Socorro Calderon APRN   4 Units at 02/02/22 0836   • ipratropium (ATROVENT HFA) inhaler 2 puff  2 puff Inhalation 4x Daily - RT Socorro Calderon APRN   2 puff at 02/02/22 0818   • loperamide (IMODIUM) capsule 2 mg  2 mg Oral 4x Daily PRN Socorro Calderon APRN       • losartan (COZAAR) tablet 25 mg  25 mg Oral Daily Socorro Calderon APRN   25 mg at 02/02/22 0835   • melatonin tablet 5.25 mg  5.25 mg Oral Nightly PRN Socorro Calderon APRN       • ondansetron (ZOFRAN) tablet 4 mg  4 mg Oral Q6H PRN Socorro Calderon APRN   4 mg at 02/02/22 0337    Or   • ondansetron (ZOFRAN) injection 4 mg  4 mg Intravenous Q6H PRN Socorro Calderon APRN       • Pharmacy Consult - Pharmacy to dose   Does not apply Continuous PRN Socorro Calderon APRN       • sodium chloride 0.9 % flush 10 mL  10 mL Intravenous PRN Johnathan Soares MD       • sodium chloride 0.9 % flush 10 mL  10 mL Intravenous Q12H Socorro Calderon APRN   10 mL at 02/02/22 0836   • sodium chloride 0.9 % flush 10 mL  10 mL Intravenous PRN Socorro Calderon APRN            Physician Progress Notes (last 24 hours)      Socorro Calderon APRN at 02/01/22 1240     Attestation signed by Gene Tariq MD at 02/01/22 1626    I personally evaluated and examined the patient in conjunction with FLORENCE Barrios and agree with the assessment, treatment plan, and disposition of the patient as recorded.  Cardiovascular: Normal S1 and S2.  Lungs: Distant breath sounds bilaterally.                      [facility] Medicine Services  INPATIENT PROGRESS NOTE    Length of Stay: 2  Date of Admission: 1/30/2022  Primary Care Physician: Emily, Bartolo I, APRN    Subjective   Chief Complaint: \"feeling much better today\"   HPI:  56 year old male with past medical history of Type 2 DM, HTN, HLD who presented on 1/30/22 with complaints of " diarrhea and shortness of breath.  He is currently admitted for acute hypoxic respiratory failure related to covid 19 viral pneumonia. During today's visit, he reports feeling much better today in regards to his breathing.  He reports being able to prone intermittently overnight and feels that it has held.     Review of Systems   Constitutional: Positive for fatigue. Negative for chills and fever.   HENT: Negative for congestion, rhinorrhea and sore throat.    Respiratory: Positive for shortness of breath. Negative for cough, chest tightness and wheezing.    Cardiovascular: Negative for chest pain, palpitations and leg swelling.   Gastrointestinal: Negative for abdominal pain, constipation, diarrhea, nausea and vomiting.   Musculoskeletal: Negative for back pain and neck pain.   Skin: Negative for pallor.   Neurological: Negative for dizziness, weakness and headaches.   Psychiatric/Behavioral: Negative for confusion. The patient is not nervous/anxious.         All pertinent negatives and positives are as above. All other systems have been reviewed and are negative unless otherwise stated.     Objective    Temp:  [97.7 °F (36.5 °C)-98.2 °F (36.8 °C)] 98.2 °F (36.8 °C)  Heart Rate:  [75-97] 92  Resp:  [18-22] 22  BP: (108-141)/(60-76) 116/66    Physical Exam  Vitals and nursing note reviewed.   Constitutional:       General: He is not in acute distress.     Appearance: Normal appearance. He is ill-appearing.   HENT:      Head: Normocephalic and atraumatic.      Right Ear: External ear normal.      Left Ear: External ear normal.      Nose: Nose normal.      Mouth/Throat:      Mouth: Mucous membranes are moist.      Pharynx: Oropharynx is clear.   Eyes:      General: No scleral icterus.        Right eye: No discharge.         Left eye: No discharge.      Conjunctiva/sclera: Conjunctivae normal.   Cardiovascular:      Rate and Rhythm: Normal rate and regular rhythm.      Pulses: Normal pulses.      Heart sounds: Normal  heart sounds. No murmur heard.  No friction rub. No gallop.    Pulmonary:      Effort: Pulmonary effort is normal. No respiratory distress.      Breath sounds: Normal breath sounds. No stridor. No wheezing, rhonchi or rales.   Abdominal:      General: Bowel sounds are normal. There is no distension.      Palpations: Abdomen is soft.      Tenderness: There is no abdominal tenderness.   Musculoskeletal:         General: No swelling. Normal range of motion.      Cervical back: Normal range of motion and neck supple.   Skin:     General: Skin is warm and dry.   Neurological:      General: No focal deficit present.      Mental Status: He is alert and oriented to person, place, and time.   Psychiatric:         Mood and Affect: Mood normal.         Behavior: Behavior normal.             Results Review:  I have reviewed the labs, radiology results, and diagnostic studies.    Laboratory Data:   Results from last 7 days   Lab Units 02/01/22  0609 01/31/22  0805 01/30/22  1050   SODIUM mmol/L 139 140 141   POTASSIUM mmol/L 4.4 3.9 3.9   CHLORIDE mmol/L 100 101 99   CO2 mmol/L 25.0 23.0 28.0   BUN mg/dL 36* 32* 20   CREATININE mg/dL 1.04 1.03 1.20   GLUCOSE mg/dL 247* 278* 203*   CALCIUM mg/dL 8.8 9.0 9.3   BILIRUBIN mg/dL 0.4 0.3 0.4   ALK PHOS U/L 98 91 98   ALT (SGPT) U/L 34 29 33   AST (SGOT) U/L 79* 60* 76*   ANION GAP mmol/L 14.0 16.0* 14.0     Estimated Creatinine Clearance: 105 mL/min (by C-G formula based on SCr of 1.04 mg/dL).          Results from last 7 days   Lab Units 02/01/22  0609 01/31/22  0805 01/30/22  1141   WBC 10*3/mm3 5.17 3.30* 2.33*   HEMOGLOBIN g/dL 15.1 15.1 15.0   HEMATOCRIT % 44.2 44.2 43.6   PLATELETS 10*3/mm3 132* 103* 80*           Culture Data:   Blood Culture   Date Value Ref Range Status   01/30/2022 No growth at 24 hours  Preliminary   01/30/2022 No growth at 24 hours  Preliminary     No results found for: URINECX  No results found for: RESPCX  No results found for: WOUNDCX  No results found  for: STOOLCX  No components found for: BODYFLD    Radiology Data:   Imaging Results (Last 24 Hours)     ** No results found for the last 24 hours. **          I have reviewed the patient's current medications.     Assessment/Plan     Active Hospital Problems    Diagnosis    • Acute respiratory failure with hypoxia (HCC)    • Pneumonia due to COVID-19 virus    • COVID-19 virus detected        Plan:    1. Acute hypoxic respiratory failure related to covid 19 viral pneumonia: continue o2 support (currently on 50 liters Airvo).  Continue decadron, Baricitinib, Albuterol, Atrovent, Symbicort, Lovenox.  Trend covid monitoring labs.  Patient is outside treatment window for use of Remdesivir as he first fell ill three weeks ago.  CTA rules out PE.    2. HTN: continue home dose of Losartan.  3. Type 2 DM: FSBS AC and HS with SSI, continue Jardiance.   4. HLD: continue statin.     I confirmed that the patient's Advance Care Plan is present, code status is documented, or surrogate decision maker is listed in the patient's medical record.          This document has been electronically signed by FLORENCE Barrios on February 1, 2022 12:40 CST            Electronically signed by Gene Tariq MD at 02/01/22 1626       Medical Student Notes (last 24 hours)  Notes from 02/01/22 0956 through 02/02/22 0956   No notes of this type exist for this encounter.         Consult Notes (last 24 hours)  Notes from 02/01/22 0956 through 02/02/22 0956   No notes of this type exist for this encounter.

## 2022-02-02 NOTE — PLAN OF CARE
Goal Outcome Evaluation:  Plan of Care Reviewed With: patient        Progress: no change  Outcome Summary: Patient received sitting up in bed awake alert and oriented. V/S taken stable. Several calls received from Telemetry notifing that patients oxygen sats were decreased to 85-87%. When taken with pulse oximeter oxygen sats were showing 92-96% . Patient denied SOB In no acute distress at that time. Mediciated X1 for nausea with some relief. Condition remains stable. will continue to monitor.

## 2022-02-02 NOTE — PLAN OF CARE
Goal Outcome Evaluation:  Plan of Care Reviewed With: patient            Pt cont on 02 via airvo at 60 L. Pt denies any SOA at this time. Will wean as tolerated.

## 2022-02-02 NOTE — PLAN OF CARE
Goal Outcome Evaluation:  Plan of Care Reviewed With: patient        Progress: improving  Outcome Summary: Pt. long sitting in bed, sup-sit mod independent, sit-stand-sit independent, amb. 8' w/o AD at EOB no AD, no LOB, O2 decrease 83% with attempt elevated >92% after ~1min seated rest, pt. performed x10-15 reps seated LE therex EOB, Pt. left long sitting up in bed no distress on 60L Airvo.

## 2022-02-02 NOTE — PROGRESS NOTES
"    [facility] Medicine Services  INPATIENT PROGRESS NOTE    Length of Stay: 3  Date of Admission: 1/30/2022  Primary Care Physician: Bartolo Diaz APRN    Subjective   Chief Complaint: \"doing much better\"  HPI:  56 year old male with past medical history of Type 2 DM, HTN, HLD who presented on 1/30/22 with complaints of diarrhea and shortness of breath.  He is currently admitted for acute hypoxic respiratory failure related to covid 19 viral pneumonia. During today's visit, he reports \"doing much better\" and that he was able to sit on the side of the bed today to wash himself.  He is working with PT doing leg exercises when I arrive.    Review of Systems   Constitutional: Positive for fatigue. Negative for chills and fever.   HENT: Negative for congestion, rhinorrhea and sore throat.    Respiratory: Positive for shortness of breath. Negative for cough, chest tightness and wheezing.    Cardiovascular: Negative for chest pain, palpitations and leg swelling.   Gastrointestinal: Negative for abdominal pain, constipation, diarrhea, nausea and vomiting.   Musculoskeletal: Negative for back pain and neck pain.   Skin: Negative for pallor.   Neurological: Negative for dizziness, weakness and headaches.   Psychiatric/Behavioral: Negative for confusion. The patient is not nervous/anxious.         All pertinent negatives and positives are as above. All other systems have been reviewed and are negative unless otherwise stated.     Objective    Temp:  [97.2 °F (36.2 °C)-97.9 °F (36.6 °C)] 97.5 °F (36.4 °C)  Heart Rate:  [] 95  Resp:  [18-22] 20  BP: (117-134)/(67-77) 128/73    Physical Exam  Vitals and nursing note reviewed.   Constitutional:       General: He is not in acute distress.     Appearance: Normal appearance. He is ill-appearing.   HENT:      Head: Normocephalic and atraumatic.      Right Ear: External ear normal.      Left Ear: External ear normal.      Nose: Nose " normal.      Mouth/Throat:      Mouth: Mucous membranes are moist.      Pharynx: Oropharynx is clear.   Eyes:      General: No scleral icterus.        Right eye: No discharge.         Left eye: No discharge.      Conjunctiva/sclera: Conjunctivae normal.   Cardiovascular:      Rate and Rhythm: Normal rate and regular rhythm.      Pulses: Normal pulses.      Heart sounds: Normal heart sounds. No murmur heard.  No friction rub. No gallop.    Pulmonary:      Effort: Pulmonary effort is normal. No respiratory distress.      Breath sounds: Normal breath sounds. No stridor. No wheezing, rhonchi or rales.   Abdominal:      General: Bowel sounds are normal. There is no distension.      Palpations: Abdomen is soft.      Tenderness: There is no abdominal tenderness.   Musculoskeletal:         General: No swelling. Normal range of motion.      Cervical back: Normal range of motion and neck supple.   Skin:     General: Skin is warm and dry.   Neurological:      General: No focal deficit present.      Mental Status: He is alert and oriented to person, place, and time.   Psychiatric:         Mood and Affect: Mood normal.         Behavior: Behavior normal.             Results Review:  I have reviewed the labs, radiology results, and diagnostic studies.    Laboratory Data:   Results from last 7 days   Lab Units 02/02/22  0547 02/01/22  0609 01/31/22  0805   SODIUM mmol/L 140 139 140   POTASSIUM mmol/L 4.4 4.4 3.9   CHLORIDE mmol/L 102 100 101   CO2 mmol/L 23.0 25.0 23.0   BUN mg/dL 36* 36* 32*   CREATININE mg/dL 0.87 1.04 1.03   GLUCOSE mg/dL 276* 247* 278*   CALCIUM mg/dL 9.2 8.8 9.0   BILIRUBIN mg/dL 0.5 0.4 0.3   ALK PHOS U/L 106 98 91   ALT (SGPT) U/L 35 34 29   AST (SGOT) U/L 73* 79* 60*   ANION GAP mmol/L 15.0 14.0 16.0*     Estimated Creatinine Clearance: 122.2 mL/min (by C-G formula based on SCr of 0.87 mg/dL).          Results from last 7 days   Lab Units 02/02/22  0547 02/01/22  0609 01/31/22  0805 01/30/22  1141   WBC  10*3/mm3 5.36 5.17 3.30* 2.33*   HEMOGLOBIN g/dL 15.0 15.1 15.1 15.0   HEMATOCRIT % 44.4 44.2 44.2 43.6   PLATELETS 10*3/mm3 147 132* 103* 80*           Culture Data:   Blood Culture   Date Value Ref Range Status   01/30/2022 No growth at 2 days  Preliminary   01/30/2022 No growth at 2 days  Preliminary     No results found for: URINECX  No results found for: RESPCX  No results found for: WOUNDCX  No results found for: STOOLCX  No components found for: BODYFLD    Radiology Data:   Imaging Results (Last 24 Hours)     ** No results found for the last 24 hours. **          I have reviewed the patient's current medications.     Assessment/Plan     Active Hospital Problems    Diagnosis    • Acute respiratory failure with hypoxia (HCC)    • Pneumonia due to COVID-19 virus    • COVID-19 virus detected        Plan:    1. Acute hypoxic respiratory failure related to covid 19 viral pneumonia: continue o2 support (currently on 60 liters Airvo).  Continue decadron, Baricitinib, Albuterol, Atrovent, Symbicort, Lovenox.  Trend covid monitoring labs.  Patient is outside treatment window for use of Remdesivir as he first fell ill three weeks ago.  CTA rules out PE.    2. HTN: continue home dose of Losartan.  3. Type 2 DM: FSBS AC and HS with SSI, continue Jardiance.   4. HLD: continue statin.     I confirmed that the patient's Advance Care Plan is present, code status is documented, or surrogate decision maker is listed in the patient's medical record.          This document has been electronically signed by FLORENCE Barrios on February 2, 2022 10:47 CST

## 2022-02-02 NOTE — THERAPY TREATMENT NOTE
Acute Care - Physical Therapy Treatment Note  [facility]     Patient Name: Louis Brooks  : 1965  MRN: [id number]  Today's Date: 2022      Visit Dx:     ICD-10-CM ICD-9-CM   1. Pneumonia of both lungs due to infectious organism, unspecified part of lung  J18.9 483.8   2. Acute respiratory failure with hypoxia ([facility])  J96.01 518.81   3. Pneumonia due to COVID-19 virus  U07.1 480.8    J12.82 079.89   4. Impaired functional mobility, balance, gait, and endurance  Z74.09 V49.89     Patient Active Problem List   Diagnosis   • Type 2 diabetes mellitus without complication, without long-term current use of insulin ([facility])   • Elevated BP without diagnosis of hypertension   • Mixed hyperlipidemia   • Class 2 severe obesity due to excess calories with serious comorbidity and body mass index (BMI) of 36.0 to 36.9 in adult ([facility])   • Gastroesophageal reflux disease without esophagitis   • Essential hypertension   • Acute respiratory failure with hypoxia ([facility])   • Pneumonia due to COVID-19 virus   • COVID-19 virus detected     Past Medical History:   Diagnosis Date   • Arthritis    • Class 2 severe obesity due to excess calories with serious comorbidity and body mass index (BMI) of 36.0 to 36.9 in adult ([facility]) 2021   • GERD (gastroesophageal reflux disease)    • Hypertension    • Mixed hyperlipidemia 2021   • Pneumonia due to COVID-19 virus 2022   • Type 2 diabetes mellitus without complication, without long-term current use of insulin ([facility]) 2021     History reviewed. No pertinent surgical history.  PT Assessment (last 12 hours)     PT Evaluation and Treatment     Row Name 2215          Physical Therapy Time and Intention    Subjective Information no complaints  -YEN     Document Type therapy note (daily note)  -JA     Mode of Treatment individual therapy; physical therapy  -JA     Patient Effort good  -JA     Row Name 22          General Information    Patient Profile  Reviewed yes  -JA     Existing Precautions/Restrictions oxygen therapy device and L/min  -[facility] Name 02/02/22 0915          Cognition    Orientation Status (Cognition) oriented x 4  -[facility] Name 02/02/22 0915          Pain Scale: Numbers Pre/Post-Treatment    Pretreatment Pain Rating 0/10 - no pain  -     Posttreatment Pain Rating 0/10 - no pain  -[facility] Name 02/02/22 0915          Range of Motion Comprehensive    General Range of Motion bilateral lower extremity ROM WFL  -[facility] Name 02/02/22 0915          Strength Comprehensive (MMT)    General Manual Muscle Testing (MMT) Assessment no strength deficits identified  -[facility] Name 02/02/22 0915          Bed Mobility    Bed Mobility supine-sit; sit-supine  -     Supine-Sit Saunders (Bed Mobility) modified independence  -     Sit-Supine Saunders (Bed Mobility) modified independence  -     Assistive Device (Bed Mobility) head of bed elevated  -[facility] Name 02/02/22 0915          Transfers    Sit-Stand Saunders (Transfers) independent  -[facility] Name 02/02/22 0915          Gait/Stairs (Locomotion)    Saunders Level (Gait) independent  -     Assistive Device (Gait) other (see comments)  no AD  -JA     Distance in Feet (Gait) 8'  -     Pattern (Gait) step-through  -     Comment (Gait/Stairs) no AD, no LOB, minimal SOA noted  -[facility] Name 02/02/22 0915          Safety Issues, Functional Mobility    Impairments Affecting Function (Mobility) shortness of breath; endurance/activity tolerance  -[facility] Name 02/02/22 0915          Motor Skills    Therapeutic Exercise hip; knee; ankle  -[facility] Name 02/02/22 0915          Hip (Therapeutic Exercise)    Hip (Therapeutic Exercise) AROM (active range of motion); isometric exercises  -     Hip AROM (Therapeutic Exercise) bilateral; flexion  -     Hip Isometrics (Therapeutic Exercise) bilateral; aDduction  -[facility] Name 02/02/22 0915          Knee (Therapeutic  Exercise)    Knee (Therapeutic Exercise) AROM (active range of motion)  -     Knee AROM (Therapeutic Exercise) bilateral; LAQ (long arc quad)  -     Row Name 02/02/22 0915          Ankle (Therapeutic Exercise)    Ankle (Therapeutic Exercise) AROM (active range of motion)  -     Ankle AROM (Therapeutic Exercise) bilateral; dorsiflexion; plantarflexion  -     Row Name 02/02/22 0915          Vital Signs    Pretreatment Heart Rate (beats/min) 95  -     Intratreatment Heart Rate (beats/min) 92  -     Posttreatment Heart Rate (beats/min) 96  -     Pre SpO2 (%) 96  -JA     O2 Delivery Pre Treatment hi-flow  -     Intra SpO2 (%) 83  -JA     O2 Delivery Intra Treatment hi-flow  -     Post SpO2 (%) 96  -     O2 Delivery Post Treatment hi-flow  -     Pre Patient Position Supine  -     Intra Patient Position Standing  -     Post Patient Position Supine  -     Row Name 02/02/22 0915          Bed Mobility Goal 1 (PT)    Activity/Assistive Device (Bed Mobility Goal 1, PT) sit to supine/supine to sit  -     Ness Level/Cues Needed (Bed Mobility Goal 1, PT) independent  -     Time Frame (Bed Mobility Goal 1, PT) by discharge  -     Strategies/Barriers (Bed Mobility Goal 1, PT) HOB flat, no bed rails.  -     Progress/Outcomes (Bed Mobility Goal 1, PT) goal not met  -     Row Name 02/02/22 0915          Gait Training Goal 1 (PT)    Activity/Assistive Device (Gait Training Goal 1, PT) gait (walking locomotion)  -     Ness Level (Gait Training Goal 1, PT) independent  -     Distance (Gait Training Goal 1, PT) 35'x3.  -     Time Frame (Gait Training Goal 1, PT) by discharge  -     Strategies/Barriers (Gait Training Goal 1, PT) Maintain SpO2 >90%.  -     Progress/Outcome (Gait Training Goal 1, PT) goal not met  -     Row Name 02/02/22 0915          Problem Specific Goal 1 (PT)    Problem Specific Goal 1 (PT) Score 28/28 on Tinetti fall risk assessment.  -     Time Frame  (Problem Specific Goal 1, PT) by discharge  -     Strategies/Barriers (Problem Specific Goal 1, PT) Maintain SpO2 >90%.  -     Progress/Outcome (Problem Specific Goal 1, PT) goal not met  -     Row Name 02/02/22 0915          Positioning and Restraints    Pre-Treatment Position in bed  -     Post Treatment Position bed  -JA     In Bed supine; call light within reach; encouraged to call for assist; exit alarm on  -     Row Name 02/02/22 0915          Therapy Assessment/Plan (PT)    Rehab Potential (PT) good, to achieve stated therapy goals  -     Criteria for Skilled Interventions Met (PT) yes; skilled treatment is necessary  -     Row Name 02/02/22 0915          Progress Summary (PT)    Progress Toward Functional Goals (PT) progress toward functional goals as expected  -           User Key  (r) = Recorded By, (t) = Taken By, (c) = Cosigned By    Initials Name Provider Type    Zac Fajardo, PTA Physical Therapy Assistant                Physical Therapy Education                 Title: PT OT SLP Therapies (In Progress)     Topic: Physical Therapy (In Progress)     Point: Mobility training (Done)     Learning Progress Summary           Patient Acceptance, E, VU by ARMIN at 2/1/2022 4213    Comment: PT POC, goals, breathing technique.                   Point: Home exercise program (Not Started)     Learner Progress:  Not documented in this visit.          Point: Body mechanics (Not Started)     Learner Progress:  Not documented in this visit.          Point: Precautions (Not Started)     Learner Progress:  Not documented in this visit.                      User Key     Initials Effective Dates Name Provider Type Discipline     06/16/21 -  Teto Storm, PT Physical Therapist PT              PT Recommendation and Plan  Anticipated Discharge Disposition (PT): home  Therapy Frequency (PT): other (see comments) (3-7 days/week)  Progress Summary (PT)  Progress Toward Functional Goals (PT):  progress toward functional goals as expected  Plan of Care Reviewed With: patient  Progress: improving  Outcome Summary: Pt. long sitting in bed, sup-sit mod independent, sit-stand-sit independent, amb. 8' w/o AD at EOB no AD, no LOB, O2 decrease 83% with attempt elevated >92% after ~1min seated rest, pt. performed x10-15 reps seated LE therex EOB, Pt. left long sitting up in bed no distress on 60L Airvo.       Time Calculation:    PT Charges     Row Name 02/02/22 1007             Time Calculation    Start Time 0915  -JA      Stop Time 1000  -JA      Time Calculation (min) 45 min  -JA              Time Calculation- PT    Total Timed Code Minutes- PT 45 minute(s)  -JA              Timed Charges    75471 - PT Therapeutic Activity Minutes 45  -JA              Total Minutes    Timed Charges Total Minutes 45  -JA       Total Minutes 45  -JA            User Key  (r) = Recorded By, (t) = Taken By, (c) = Cosigned By    Initials Name Provider Type    Zac Fajardo PTA Physical Therapy Assistant              Therapy Charges for Today     Code Description Service Date Service Provider Modifiers Qty    [id number]  PT THERAPEUTIC ACT EA 15 MIN 2/2/2022 Zac Easley PTA GP 3          PT G-Codes  Outcome Measure Options: AM-PAC 6 Clicks Basic Mobility (PT), Tinetti  AM-PAC 6 Clicks Score (PT): 23    Zac Easley PTA  2/2/2022

## 2022-02-03 LAB
ALBUMIN SERPL-MCNC: 3.8 G/DL (ref 3.5–5.2)
ALBUMIN/GLOB SERPL: 1.1 G/DL
ALP SERPL-CCNC: 117 U/L (ref 39–117)
ALT SERPL W P-5'-P-CCNC: 43 U/L (ref 1–41)
ANION GAP SERPL CALCULATED.3IONS-SCNC: 18 MMOL/L (ref 5–15)
ANISOCYTOSIS BLD QL: ABNORMAL
AST SERPL-CCNC: 78 U/L (ref 1–40)
BILIRUB SERPL-MCNC: 0.6 MG/DL (ref 0–1.2)
BUN SERPL-MCNC: 39 MG/DL (ref 6–20)
BUN/CREAT SERPL: 47.6 (ref 7–25)
CALCIUM SPEC-SCNC: 8.9 MG/DL (ref 8.6–10.5)
CHLORIDE SERPL-SCNC: 101 MMOL/L (ref 98–107)
CO2 SERPL-SCNC: 21 MMOL/L (ref 22–29)
CREAT SERPL-MCNC: 0.82 MG/DL (ref 0.76–1.27)
CRP SERPL-MCNC: 0.5 MG/DL (ref 0–0.5)
DEPRECATED RDW RBC AUTO: 43.2 FL (ref 37–54)
ERYTHROCYTE [DISTWIDTH] IN BLOOD BY AUTOMATED COUNT: 13.6 % (ref 12.3–15.4)
FERRITIN SERPL-MCNC: 2313 NG/ML (ref 30–400)
GFR SERPL CREATININE-BSD FRML MDRD: 98 ML/MIN/1.73
GLOBULIN UR ELPH-MCNC: 3.4 GM/DL
GLUCOSE BLDC GLUCOMTR-MCNC: 213 MG/DL (ref 70–130)
GLUCOSE BLDC GLUCOMTR-MCNC: 215 MG/DL (ref 70–130)
GLUCOSE BLDC GLUCOMTR-MCNC: 252 MG/DL (ref 70–130)
GLUCOSE BLDC GLUCOMTR-MCNC: 263 MG/DL (ref 70–130)
GLUCOSE SERPL-MCNC: 258 MG/DL (ref 65–99)
HCT VFR BLD AUTO: 44 % (ref 37.5–51)
HGB BLD-MCNC: 15.1 G/DL (ref 13–17.7)
HOLD SPECIMEN: NORMAL
LYMPHOCYTES # BLD MANUAL: 0.53 10*3/MM3 (ref 0.7–3.1)
LYMPHOCYTES NFR BLD MANUAL: 6 % (ref 5–12)
MCH RBC QN AUTO: 29.9 PG (ref 26.6–33)
MCHC RBC AUTO-ENTMCNC: 34.3 G/DL (ref 31.5–35.7)
MCV RBC AUTO: 87.1 FL (ref 79–97)
MONOCYTES # BLD: 0.29 10*3/MM3 (ref 0.1–0.9)
NEUTROPHILS # BLD AUTO: 4.01 10*3/MM3 (ref 1.7–7)
NEUTROPHILS NFR BLD MANUAL: 73 % (ref 42.7–76)
NEUTS BAND NFR BLD MANUAL: 10 % (ref 0–5)
PLATELET # BLD AUTO: 139 10*3/MM3 (ref 140–450)
PMV BLD AUTO: 10.8 FL (ref 6–12)
POTASSIUM SERPL-SCNC: 4.6 MMOL/L (ref 3.5–5.2)
PROT SERPL-MCNC: 7.2 G/DL (ref 6–8.5)
QT INTERVAL: 382 MS
QTC INTERVAL: 457 MS
RBC # BLD AUTO: 5.05 10*6/MM3 (ref 4.14–5.8)
SMALL PLATELETS BLD QL SMEAR: ABNORMAL
SODIUM SERPL-SCNC: 140 MMOL/L (ref 136–145)
VARIANT LYMPHS NFR BLD MANUAL: 2 % (ref 0–5)
VARIANT LYMPHS NFR BLD MANUAL: 9 % (ref 19.6–45.3)
WBC MORPH BLD: NORMAL
WBC NRBC COR # BLD: 4.83 10*3/MM3 (ref 3.4–10.8)

## 2022-02-03 PROCEDURE — 85025 COMPLETE CBC W/AUTO DIFF WBC: CPT | Performed by: NURSE PRACTITIONER

## 2022-02-03 PROCEDURE — 86140 C-REACTIVE PROTEIN: CPT | Performed by: NURSE PRACTITIONER

## 2022-02-03 PROCEDURE — 25010000002 ENOXAPARIN PER 10 MG: Performed by: NURSE PRACTITIONER

## 2022-02-03 PROCEDURE — 63710000001 DEXAMETHASONE PER 0.25 MG: Performed by: NURSE PRACTITIONER

## 2022-02-03 PROCEDURE — 63710000001 INSULIN ASPART PER 5 UNITS: Performed by: NURSE PRACTITIONER

## 2022-02-03 PROCEDURE — 80053 COMPREHEN METABOLIC PANEL: CPT | Performed by: NURSE PRACTITIONER

## 2022-02-03 PROCEDURE — 82962 GLUCOSE BLOOD TEST: CPT

## 2022-02-03 PROCEDURE — 36415 COLL VENOUS BLD VENIPUNCTURE: CPT | Performed by: NURSE PRACTITIONER

## 2022-02-03 PROCEDURE — 85007 BL SMEAR W/DIFF WBC COUNT: CPT | Performed by: NURSE PRACTITIONER

## 2022-02-03 PROCEDURE — 82728 ASSAY OF FERRITIN: CPT | Performed by: NURSE PRACTITIONER

## 2022-02-03 PROCEDURE — 94799 UNLISTED PULMONARY SVC/PX: CPT

## 2022-02-03 RX ORDER — PANTOPRAZOLE SODIUM 40 MG/1
40 TABLET, DELAYED RELEASE ORAL
Status: DISCONTINUED | OUTPATIENT
Start: 2022-02-04 | End: 2022-02-10

## 2022-02-03 RX ORDER — ALUMINA, MAGNESIA, AND SIMETHICONE 2400; 2400; 240 MG/30ML; MG/30ML; MG/30ML
15 SUSPENSION ORAL ONCE
Status: COMPLETED | OUTPATIENT
Start: 2022-02-03 | End: 2022-02-03

## 2022-02-03 RX ADMIN — ENOXAPARIN SODIUM 110 MG: 120 INJECTION SUBCUTANEOUS at 04:21

## 2022-02-03 RX ADMIN — ALBUTEROL SULFATE 2 PUFF: 90 AEROSOL, METERED RESPIRATORY (INHALATION) at 15:30

## 2022-02-03 RX ADMIN — INSULIN ASPART 4 UNITS: 100 INJECTION, SOLUTION INTRAVENOUS; SUBCUTANEOUS at 12:12

## 2022-02-03 RX ADMIN — BUDESONIDE AND FORMOTEROL FUMARATE DIHYDRATE 2 PUFF: 160; 4.5 AEROSOL RESPIRATORY (INHALATION) at 18:19

## 2022-02-03 RX ADMIN — ATORVASTATIN CALCIUM 40 MG: 40 TABLET, FILM COATED ORAL at 20:47

## 2022-02-03 RX ADMIN — IPRATROPIUM BROMIDE 2 PUFF: 17 AEROSOL, METERED RESPIRATORY (INHALATION) at 15:30

## 2022-02-03 RX ADMIN — IPRATROPIUM BROMIDE 2 PUFF: 17 AEROSOL, METERED RESPIRATORY (INHALATION) at 05:02

## 2022-02-03 RX ADMIN — SODIUM CHLORIDE, PRESERVATIVE FREE 10 ML: 5 INJECTION INTRAVENOUS at 20:47

## 2022-02-03 RX ADMIN — INSULIN ASPART 6 UNITS: 100 INJECTION, SOLUTION INTRAVENOUS; SUBCUTANEOUS at 16:36

## 2022-02-03 RX ADMIN — SODIUM CHLORIDE, PRESERVATIVE FREE 10 ML: 5 INJECTION INTRAVENOUS at 08:41

## 2022-02-03 RX ADMIN — CANAGLIFLOZIN 300 MG: 300 TABLET, FILM COATED ORAL at 08:40

## 2022-02-03 RX ADMIN — MAGNESIUM HYDROXIDE,ALUMINUM HYDROXICE,SIMETHICONE 15 ML: 240; 2400; 2400 SUSPENSION ORAL at 16:32

## 2022-02-03 RX ADMIN — DEXAMETHASONE 6 MG: 2 TABLET ORAL at 20:47

## 2022-02-03 RX ADMIN — BUDESONIDE AND FORMOTEROL FUMARATE DIHYDRATE 2 PUFF: 160; 4.5 AEROSOL RESPIRATORY (INHALATION) at 08:44

## 2022-02-03 RX ADMIN — IPRATROPIUM BROMIDE 2 PUFF: 17 AEROSOL, METERED RESPIRATORY (INHALATION) at 12:13

## 2022-02-03 RX ADMIN — ALBUTEROL SULFATE 2 PUFF: 90 AEROSOL, METERED RESPIRATORY (INHALATION) at 18:19

## 2022-02-03 RX ADMIN — LOSARTAN POTASSIUM 25 MG: 25 TABLET, FILM COATED ORAL at 08:40

## 2022-02-03 RX ADMIN — CALCIUM CARBONATE (ANTACID) CHEW TAB 500 MG 2 TABLET: 500 CHEW TAB at 08:40

## 2022-02-03 RX ADMIN — ALBUTEROL SULFATE 2 PUFF: 90 AEROSOL, METERED RESPIRATORY (INHALATION) at 12:13

## 2022-02-03 RX ADMIN — DEXAMETHASONE 6 MG: 2 TABLET ORAL at 08:40

## 2022-02-03 RX ADMIN — ENOXAPARIN SODIUM 40 MG: 40 INJECTION SUBCUTANEOUS at 17:00

## 2022-02-03 RX ADMIN — INSULIN ASPART 4 UNITS: 100 INJECTION, SOLUTION INTRAVENOUS; SUBCUTANEOUS at 08:41

## 2022-02-03 RX ADMIN — BARICITINIB 4 MG: 2 TABLET, FILM COATED ORAL at 12:13

## 2022-02-03 RX ADMIN — ALBUTEROL SULFATE 2 PUFF: 90 AEROSOL, METERED RESPIRATORY (INHALATION) at 05:02

## 2022-02-03 NOTE — PLAN OF CARE
Goal Outcome Evaluation:  Plan of Care Reviewed With: patient           Outcome Summary: VSS, on airvo 60L currently, a&ox4. Sleeping well with no complaints at this time. Will continue to monitor.

## 2022-02-03 NOTE — PROGRESS NOTES
[facility]  INPATIENT PROGRESS NOTE    Length of Stay: 4  Date of Admission: 1/30/2022  Primary Care Physician: Bartolo Diaz APRN    Subjective   Chief Complaint: shortness of breath  HPI:  56 year old male with a history of DMII, HTN, and HLD who is admitted with acute respiratory failure due to COVID-19 pneumonia.  He is on Airvo at 60 LPM at 100% FiO2.     Review of Systems   Constitutional: Negative for chills and fever.   Respiratory: Positive for cough. Negative for shortness of breath.    Cardiovascular: Negative for chest pain.   Gastrointestinal: Negative for abdominal pain, nausea and vomiting.   All other systems reviewed and are negative.       All pertinent negatives and positives are as above. All other systems have been reviewed and are negative unless otherwise stated.     Objective    Temp:  [97 °F (36.1 °C)-98.1 °F (36.7 °C)] 98.1 °F (36.7 °C)  Heart Rate:  [] 112  Resp:  [18-20] 18  BP: (115-131)/(58-71) 121/71    Physical Exam  Vitals reviewed.   Constitutional:       Appearance: Normal appearance. He is ill-appearing.   HENT:      Head: Normocephalic and atraumatic.   Cardiovascular:      Rate and Rhythm: Normal rate and regular rhythm.   Pulmonary:      Effort: Pulmonary effort is normal.      Breath sounds: Normal breath sounds.   Abdominal:      General: There is no distension.      Palpations: Abdomen is soft.      Tenderness: There is no abdominal tenderness.   Musculoskeletal:         General: No swelling or deformity.   Skin:     General: Skin is warm and dry.   Neurological:      General: No focal deficit present.      Mental Status: He is alert and oriented to person, place, and time.       Results Review:  I have reviewed the labs, radiology results, and diagnostic studies.    Laboratory Data:   Results from last 7 days   Lab Units 02/03/22  0624 02/02/22  0547 02/01/22  0609   SODIUM mmol/L 140 140 139    POTASSIUM mmol/L 4.6 4.4 4.4   CHLORIDE mmol/L 101 102 100   CO2 mmol/L 21.0* 23.0 25.0   BUN mg/dL 39* 36* 36*   CREATININE mg/dL 0.82 0.87 1.04   GLUCOSE mg/dL 258* 276* 247*   CALCIUM mg/dL 8.9 9.2 8.8   BILIRUBIN mg/dL 0.6 0.5 0.4   ALK PHOS U/L 117 106 98   ALT (SGPT) U/L 43* 35 34   AST (SGOT) U/L 78* 73* 79*   ANION GAP mmol/L 18.0* 15.0 14.0     Estimated Creatinine Clearance: 127.9 mL/min (by C-G formula based on SCr of 0.82 mg/dL).          Results from last 7 days   Lab Units 02/03/22  0624 02/02/22  0547 02/01/22  0609 01/31/22  0805 01/30/22  1141   WBC 10*3/mm3 4.83 5.36 5.17 3.30* 2.33*   HEMOGLOBIN g/dL 15.1 15.0 15.1 15.1 15.0   HEMATOCRIT % 44.0 44.4 44.2 44.2 43.6   PLATELETS 10*3/mm3 139* 147 132* 103* 80*           Culture Data:   No results found for: BLOODCX  No results found for: URINECX  No results found for: RESPCX  No results found for: WOUNDCX  No results found for: STOOLCX  No components found for: BODYFLD    Radiology Data:   Imaging Results (Last 24 Hours)     ** No results found for the last 24 hours. **          I have reviewed the patient's current medications.     Assessment/Plan     Active Hospital Problems    Diagnosis    • Acute respiratory failure with hypoxia (HCC)    • Pneumonia due to COVID-19 virus    • COVID-19 virus detected    HTN  DMII    Plan:    Supplemental oxygen, currently Airvo 60 LPM at 100% FiO2, wean as tolerated  Remdesivir not indicated due to duration of illness  Decadron day 5  Baricitinib day 4  Trend inflammatory markers, improved  Albuterol MDI, Atrovent MDI, Symbicort  Incentive spirometry, OPEP  BP control: Losartan  Glucose control: Invokana, SSI  PT/OT  VTE PPx: Lovenox BID      I confirmed that the patient's Advance Care Plan is present, code status is documented, or surrogate decision maker is listed in the patient's medical record.     The patient was evaluated during the global COVID-19 pandemic, and the diagnosis was suspected/considered upon  their initial presentation.  Evaluation, treatment, and testing were consistent with current guidelines for patients who present with complaints or symptoms that may be related to COVID-19.            This document has been electronically signed by FLORENCE Lemos on February 3, 2022 13:23 CST

## 2022-02-03 NOTE — SIGNIFICANT NOTE
"   02/03/22 1400   OTHER   Discipline physical therapy assistant   Rehab Time/Intention   Session Not Performed patient/family declined treatment  (Pt. reports \" been working all morning\" Pt. reports independently transfers to & from BSC, to recliner & sat up ~3hrs this a.m. while monitoring O2 SATS & reports O2 staying in mid 90's today, resting right now and defers any additional mobility.)     "

## 2022-02-04 LAB
ALBUMIN SERPL-MCNC: 3.8 G/DL (ref 3.5–5.2)
ALBUMIN/GLOB SERPL: 1.1 G/DL
ALP SERPL-CCNC: 126 U/L (ref 39–117)
ALT SERPL W P-5'-P-CCNC: 54 U/L (ref 1–41)
ANION GAP SERPL CALCULATED.3IONS-SCNC: 17 MMOL/L (ref 5–15)
AST SERPL-CCNC: 74 U/L (ref 1–40)
BACTERIA SPEC AEROBE CULT: NORMAL
BACTERIA SPEC AEROBE CULT: NORMAL
BASOPHILS # BLD AUTO: 0.01 10*3/MM3 (ref 0–0.2)
BASOPHILS NFR BLD AUTO: 0.2 % (ref 0–1.5)
BILIRUB SERPL-MCNC: 0.6 MG/DL (ref 0–1.2)
BUN SERPL-MCNC: 38 MG/DL (ref 6–20)
BUN/CREAT SERPL: 44.2 (ref 7–25)
CALCIUM SPEC-SCNC: 9 MG/DL (ref 8.6–10.5)
CHLORIDE SERPL-SCNC: 102 MMOL/L (ref 98–107)
CO2 SERPL-SCNC: 21 MMOL/L (ref 22–29)
CREAT SERPL-MCNC: 0.86 MG/DL (ref 0.76–1.27)
CRP SERPL-MCNC: 0.32 MG/DL (ref 0–0.5)
D-DIMER, QUANTITATIVE (MAD,POW, STR): 507 NG/ML (FEU) (ref 0–470)
DEPRECATED RDW RBC AUTO: 43.2 FL (ref 37–54)
EOSINOPHIL # BLD AUTO: 0 10*3/MM3 (ref 0–0.4)
EOSINOPHIL NFR BLD AUTO: 0 % (ref 0.3–6.2)
ERYTHROCYTE [DISTWIDTH] IN BLOOD BY AUTOMATED COUNT: 13.2 % (ref 12.3–15.4)
FERRITIN SERPL-MCNC: 1849 NG/ML (ref 30–400)
GFR SERPL CREATININE-BSD FRML MDRD: 93 ML/MIN/1.73
GLOBULIN UR ELPH-MCNC: 3.6 GM/DL
GLUCOSE BLDC GLUCOMTR-MCNC: 208 MG/DL (ref 70–130)
GLUCOSE BLDC GLUCOMTR-MCNC: 212 MG/DL (ref 70–130)
GLUCOSE BLDC GLUCOMTR-MCNC: 237 MG/DL (ref 70–130)
GLUCOSE BLDC GLUCOMTR-MCNC: 283 MG/DL (ref 70–130)
GLUCOSE BLDC GLUCOMTR-MCNC: 289 MG/DL (ref 70–130)
GLUCOSE SERPL-MCNC: 234 MG/DL (ref 65–99)
HCT VFR BLD AUTO: 45.5 % (ref 37.5–51)
HGB BLD-MCNC: 15.5 G/DL (ref 13–17.7)
IMM GRANULOCYTES # BLD AUTO: 0.03 10*3/MM3 (ref 0–0.05)
IMM GRANULOCYTES NFR BLD AUTO: 0.5 % (ref 0–0.5)
LDH SERPL-CCNC: 867 U/L (ref 135–225)
LYMPHOCYTES # BLD AUTO: 0.5 10*3/MM3 (ref 0.7–3.1)
LYMPHOCYTES NFR BLD AUTO: 9 % (ref 19.6–45.3)
MCH RBC QN AUTO: 30 PG (ref 26.6–33)
MCHC RBC AUTO-ENTMCNC: 34.1 G/DL (ref 31.5–35.7)
MCV RBC AUTO: 88.2 FL (ref 79–97)
MONOCYTES # BLD AUTO: 0.35 10*3/MM3 (ref 0.1–0.9)
MONOCYTES NFR BLD AUTO: 6.3 % (ref 5–12)
NEUTROPHILS NFR BLD AUTO: 4.64 10*3/MM3 (ref 1.7–7)
NEUTROPHILS NFR BLD AUTO: 84 % (ref 42.7–76)
NRBC BLD AUTO-RTO: 0 /100 WBC (ref 0–0.2)
PLATELET # BLD AUTO: 165 10*3/MM3 (ref 140–450)
PMV BLD AUTO: 10.6 FL (ref 6–12)
POTASSIUM SERPL-SCNC: 5.2 MMOL/L (ref 3.5–5.2)
PROCALCITONIN SERPL-MCNC: 0.06 NG/ML (ref 0–0.25)
PROT SERPL-MCNC: 7.4 G/DL (ref 6–8.5)
RBC # BLD AUTO: 5.16 10*6/MM3 (ref 4.14–5.8)
SODIUM SERPL-SCNC: 140 MMOL/L (ref 136–145)
WBC NRBC COR # BLD: 5.53 10*3/MM3 (ref 3.4–10.8)

## 2022-02-04 PROCEDURE — 86140 C-REACTIVE PROTEIN: CPT | Performed by: NURSE PRACTITIONER

## 2022-02-04 PROCEDURE — 82728 ASSAY OF FERRITIN: CPT | Performed by: NURSE PRACTITIONER

## 2022-02-04 PROCEDURE — 80053 COMPREHEN METABOLIC PANEL: CPT | Performed by: NURSE PRACTITIONER

## 2022-02-04 PROCEDURE — 94799 UNLISTED PULMONARY SVC/PX: CPT

## 2022-02-04 PROCEDURE — 84145 PROCALCITONIN (PCT): CPT | Performed by: NURSE PRACTITIONER

## 2022-02-04 PROCEDURE — 85379 FIBRIN DEGRADATION QUANT: CPT | Performed by: NURSE PRACTITIONER

## 2022-02-04 PROCEDURE — 85025 COMPLETE CBC W/AUTO DIFF WBC: CPT | Performed by: NURSE PRACTITIONER

## 2022-02-04 PROCEDURE — 82962 GLUCOSE BLOOD TEST: CPT

## 2022-02-04 PROCEDURE — 63710000001 DEXAMETHASONE PER 0.25 MG: Performed by: NURSE PRACTITIONER

## 2022-02-04 PROCEDURE — 83615 LACTATE (LD) (LDH) ENZYME: CPT | Performed by: NURSE PRACTITIONER

## 2022-02-04 PROCEDURE — 36415 COLL VENOUS BLD VENIPUNCTURE: CPT | Performed by: NURSE PRACTITIONER

## 2022-02-04 PROCEDURE — 63710000001 INSULIN ASPART PER 5 UNITS: Performed by: NURSE PRACTITIONER

## 2022-02-04 RX ORDER — NICOTINE POLACRILEX 4 MG
15 LOZENGE BUCCAL
Status: DISCONTINUED | OUTPATIENT
Start: 2022-02-04 | End: 2022-02-04 | Stop reason: SDUPTHER

## 2022-02-04 RX ORDER — DEXTROSE MONOHYDRATE 25 G/50ML
25 INJECTION, SOLUTION INTRAVENOUS
Status: DISCONTINUED | OUTPATIENT
Start: 2022-02-04 | End: 2022-02-04 | Stop reason: SDUPTHER

## 2022-02-04 RX ADMIN — BUDESONIDE AND FORMOTEROL FUMARATE DIHYDRATE 2 PUFF: 160; 4.5 AEROSOL RESPIRATORY (INHALATION) at 08:42

## 2022-02-04 RX ADMIN — IPRATROPIUM BROMIDE 2 PUFF: 17 AEROSOL, METERED RESPIRATORY (INHALATION) at 15:43

## 2022-02-04 RX ADMIN — INSULIN ASPART 6 UNITS: 100 INJECTION, SOLUTION INTRAVENOUS; SUBCUTANEOUS at 11:39

## 2022-02-04 RX ADMIN — IPRATROPIUM BROMIDE 2 PUFF: 17 AEROSOL, METERED RESPIRATORY (INHALATION) at 20:25

## 2022-02-04 RX ADMIN — PANTOPRAZOLE SODIUM 40 MG: 40 TABLET, DELAYED RELEASE ORAL at 05:06

## 2022-02-04 RX ADMIN — SODIUM CHLORIDE, PRESERVATIVE FREE 10 ML: 5 INJECTION INTRAVENOUS at 20:42

## 2022-02-04 RX ADMIN — SODIUM CHLORIDE, PRESERVATIVE FREE 10 ML: 5 INJECTION INTRAVENOUS at 08:46

## 2022-02-04 RX ADMIN — ALBUTEROL SULFATE 2 PUFF: 90 AEROSOL, METERED RESPIRATORY (INHALATION) at 20:25

## 2022-02-04 RX ADMIN — IPRATROPIUM BROMIDE 2 PUFF: 17 AEROSOL, METERED RESPIRATORY (INHALATION) at 08:43

## 2022-02-04 RX ADMIN — CANAGLIFLOZIN 300 MG: 300 TABLET, FILM COATED ORAL at 08:46

## 2022-02-04 RX ADMIN — DEXAMETHASONE 6 MG: 2 TABLET ORAL at 08:46

## 2022-02-04 RX ADMIN — BARICITINIB 4 MG: 2 TABLET, FILM COATED ORAL at 08:46

## 2022-02-04 RX ADMIN — BUDESONIDE AND FORMOTEROL FUMARATE DIHYDRATE 2 PUFF: 160; 4.5 AEROSOL RESPIRATORY (INHALATION) at 20:25

## 2022-02-04 RX ADMIN — ALBUTEROL SULFATE 2 PUFF: 90 AEROSOL, METERED RESPIRATORY (INHALATION) at 15:41

## 2022-02-04 RX ADMIN — LOSARTAN POTASSIUM 25 MG: 25 TABLET, FILM COATED ORAL at 08:46

## 2022-02-04 RX ADMIN — INSULIN ASPART 8 UNITS: 100 INJECTION, SOLUTION INTRAVENOUS; SUBCUTANEOUS at 16:42

## 2022-02-04 RX ADMIN — DEXAMETHASONE 6 MG: 2 TABLET ORAL at 20:41

## 2022-02-04 RX ADMIN — INSULIN ASPART 4 UNITS: 100 INJECTION, SOLUTION INTRAVENOUS; SUBCUTANEOUS at 08:45

## 2022-02-04 RX ADMIN — IPRATROPIUM BROMIDE 2 PUFF: 17 AEROSOL, METERED RESPIRATORY (INHALATION) at 05:07

## 2022-02-04 RX ADMIN — ALBUTEROL SULFATE 2 PUFF: 90 AEROSOL, METERED RESPIRATORY (INHALATION) at 05:07

## 2022-02-04 RX ADMIN — ATORVASTATIN CALCIUM 40 MG: 40 TABLET, FILM COATED ORAL at 20:41

## 2022-02-04 NOTE — PROGRESS NOTES
[facility]  INPATIENT PROGRESS NOTE    Length of Stay: 5  Date of Admission: 1/30/2022  Primary Care Physician: Bartolo Diaz APRN    Subjective   Chief Complaint: shortness of breath  HPI:  56 year old male with a history of DMII, HTN, and HLD who is admitted with acute respiratory failure due to COVID-19 pneumonia.  He is on Airvo at 60 LPM at 94% FiO2.     Review of Systems   Constitutional: Negative for chills and fever.   Respiratory: Positive for cough. Negative for shortness of breath.    Cardiovascular: Negative for chest pain.   Gastrointestinal: Negative for abdominal pain, nausea and vomiting.   All other systems reviewed and are negative.       All pertinent negatives and positives are as above. All other systems have been reviewed and are negative unless otherwise stated.     Objective    Temp:  [97.1 °F (36.2 °C)-97.6 °F (36.4 °C)] 97.6 °F (36.4 °C)  Heart Rate:  [] 86  Resp:  [16-20] 19  BP: (110-145)/(64-83) 145/83    Physical Exam  Vitals reviewed.   Constitutional:       Appearance: Normal appearance. He is ill-appearing.   HENT:      Head: Normocephalic and atraumatic.   Cardiovascular:      Rate and Rhythm: Normal rate and regular rhythm.   Pulmonary:      Effort: Pulmonary effort is normal.      Breath sounds: Normal breath sounds.   Abdominal:      General: There is no distension.      Palpations: Abdomen is soft.      Tenderness: There is no abdominal tenderness.   Musculoskeletal:         General: No swelling or deformity.   Skin:     General: Skin is warm and dry.   Neurological:      General: No focal deficit present.      Mental Status: He is alert and oriented to person, place, and time.       Results Review:  I have reviewed the labs, radiology results, and diagnostic studies.    Laboratory Data:   Results from last 7 days   Lab Units 02/04/22  0655 02/03/22  0624 02/02/22  0547   SODIUM mmol/L 140 140 140    POTASSIUM mmol/L 5.2 4.6 4.4   CHLORIDE mmol/L 102 101 102   CO2 mmol/L 21.0* 21.0* 23.0   BUN mg/dL 38* 39* 36*   CREATININE mg/dL 0.86 0.82 0.87   GLUCOSE mg/dL 234* 258* 276*   CALCIUM mg/dL 9.0 8.9 9.2   BILIRUBIN mg/dL 0.6 0.6 0.5   ALK PHOS U/L 126* 117 106   ALT (SGPT) U/L 54* 43* 35   AST (SGOT) U/L 74* 78* 73*   ANION GAP mmol/L 17.0* 18.0* 15.0     Estimated Creatinine Clearance: 122.5 mL/min (by C-G formula based on SCr of 0.86 mg/dL).          Results from last 7 days   Lab Units 02/04/22  0655 02/03/22  0624 02/02/22  0547 02/01/22  0609 01/31/22  0805   WBC 10*3/mm3 5.53 4.83 5.36 5.17 3.30*   HEMOGLOBIN g/dL 15.5 15.1 15.0 15.1 15.1   HEMATOCRIT % 45.5 44.0 44.4 44.2 44.2   PLATELETS 10*3/mm3 165 139* 147 132* 103*           Culture Data:   No results found for: BLOODCX  No results found for: URINECX  No results found for: RESPCX  No results found for: WOUNDCX  No results found for: STOOLCX  No components found for: BODYFLD    Radiology Data:   Imaging Results (Last 24 Hours)     ** No results found for the last 24 hours. **          I have reviewed the patient's current medications.     Assessment/Plan     Active Hospital Problems    Diagnosis    • Acute respiratory failure with hypoxia (HCC)    • Pneumonia due to COVID-19 virus    • COVID-19 virus detected    HTN  DMII    Plan:    Supplemental oxygen, currently Airvo 60 LPM at 94% FiO2, wean as tolerated  Remdesivir not indicated due to duration of illness  Decadron day 6  Baricitinib day 5  Trend inflammatory markers, improved  Albuterol MDI, Atrovent MDI, Symbicort  Incentive spirometry, OPEP  BP control: Losartan  Glucose control: Invokana, SSI  PT/OT  VTE PPx: Lovenox BID      I confirmed that the patient's Advance Care Plan is present, code status is documented, or surrogate decision maker is listed in the patient's medical record.     The patient was evaluated during the global COVID-19 pandemic, and the diagnosis was suspected/considered  upon their initial presentation.  Evaluation, treatment, and testing were consistent with current guidelines for patients who present with complaints or symptoms that may be related to COVID-19.    Approximately 36 minutes of critical care time were spent managing the patient exclusive of billable procedures.           This document has been electronically signed by FLORENCE Lemos on February 4, 2022 12:38 CST

## 2022-02-04 NOTE — SIGNIFICANT NOTE
02/04/22 0831   OTHER   Discipline occupational therapist   Rehab Time/Intention   Session Not Performed patient/family declined evaluation  (Patient states he is independent in ADLs and feels independent in his room. Denies need for OT at this time. Patient educated how OT can assist with endurance training, patient denies need for assistance.)     Patient educated that if he feels like he is getting debilitated or feels for a need for OT in future, to let provider to know to re-order, and OT will be gladly provide assist as needed. Patient verbalized understanding and appreciative. Will d/c OT order at this time. Discussed with RN.

## 2022-02-04 NOTE — PLAN OF CARE
Problem: Adult Inpatient Plan of Care  Goal: Plan of Care Review  Outcome: Ongoing, Progressing  Flowsheets (Taken 2/4/2022 1147)  Progress: improving (Pended)  Plan of Care Reviewed With: patient (Pended)   Goal Outcome Evaluation:  Plan of Care Reviewed With: (P) patient        Progress: (P) improving   Pt has good appetite and good-fair intake.  Pt has had some nausea and heart burn. He reported a 3-4 lb wt loss in 5 days.  RDN staff will continue to monitor.

## 2022-02-04 NOTE — PLAN OF CARE
Goal Outcome Evaluation:  Plan of Care Reviewed With: patient        Progress: improving  Outcome Summary: VSS, currently on 60L heated high flow with sats between 92-94%. No complaints from pt at this time. Will continue to monitor.

## 2022-02-04 NOTE — PROGRESS NOTES
Discharge Planning Assessment  [facility]     Patient Name: Louis Brooks  MRN: [id number]  Today's Date: 2/4/2022    Admit Date: 1/30/2022     Discharge Needs Assessment     Row Name 02/04/22 1149       Living Environment    Lives With spouse; child(brando), adult; child(brando), dependent    Name(s) of Who Lives With Patient has a 19 yr old and twin 10 yr olds along with spouse    Current Living Arrangements home/apartment/condo    Primary Care Provided by self    Provides Primary Care For other (see comments)  shares with spouse    Quality of Family Relationships helpful    Able to Return to Prior Arrangements yes       Resource/Environmental Concerns    Transportation Concerns car, none       Transition Planning    Patient/Family Anticipates Transition to home with family    Transportation Anticipated family or friend will provide       Discharge Needs Assessment    Equipment Currently Used at Home none    Equipment Needed After Discharge oxygen    Discharge Coordination/Progress pt independent prior to coming in.  never used hh.  is o2 is needed would like [employer].  has good support, rx coverage with copay and transportation.               Discharge Plan     Row Name 02/04/22 1105       Plan    Plan Comments cont on 60% airvo  sats at 94%  cont alb, dec and olumiant.  sheng calixto rn              Continued Care and Services - Admitted Since 1/30/2022    Coordination has not been started for this encounter.       Expected Discharge Date and Time     Expected Discharge Date Expected Discharge Time    Feb 7, 2022          Demographic Summary     Row Name 02/04/22 1148       General Information    Admission Type inpatient    Arrived From home    Referral Source high risk screening    Reason for Consult discharge planning    Preferred Language English     Used During This Interaction no    General Information Comments confirmed face sheet and pharmacy with spouse               Functional Status    No  documentation.                Psychosocial    No documentation.                Abuse/Neglect    No documentation.                Legal    No documentation.                Substance Abuse    No documentation.                Patient Forms    No documentation.                   Alea Cardozo RN

## 2022-02-04 NOTE — PLAN OF CARE
Problem: Adult Inpatient Plan of Care  Goal: Plan of Care Review  Outcome: Ongoing, Progressing  Flowsheets (Taken 2/3/2022 1900)  Plan of Care Reviewed With: patient  Outcome Summary: Pt remains on 60L on heated high flow. sats 92%. Up to chair most of shift. complained of indigestion, MD notified and orders placed. pt states he feels relief. Will monitor.   Goal Outcome Evaluation:  Plan of Care Reviewed With: patient           Outcome Summary: Pt remains on 60L on heated high flow. sats 92%. Up to chair most of shift. complained of indigestion, MD notified and orders placed. pt states he feels relief. Will monitor.

## 2022-02-04 NOTE — PAYOR COMM NOTE
"Magda Rodríguez RN [facility]  [phone]      Phone  [phone]        Fax  Cont. Stay Review    Da Falk (56 y.o. Male)             Date of Birth Social Security Number Address Home Phone MRN    1965  [address] [place] [address] [phone] [id number]    Synagogue Marital Status             Yazidism        Admission Date Admission Type Admitting Provider Attending Provider Department, Room/Bed    1/30/22 Emergency Dennis Benson MD [place], Emmanuel [place], MD [facility] 4 WEST, 418/1    Discharge Date Discharge Disposition Discharge Destination                         Attending Provider: Dennis Benson MD    Allergies: No Known Allergies    Isolation: Enh Drop/Con   Infection: COVID (confirmed) (01/30/22)   Code Status: CPR   Advance Care Planning Activity    Ht: 182.9 cm (72\")   Wt: 104 kg (228 lb 2.8 oz)    Admission Cmt: None   Principal Problem: None                Active Insurance as of 1/30/2022     Primary Coverage     Payor Plan Insurance Group Employer/Plan Group    GLOBAL CARE LBP GLOBAL CARE      Payor Plan Address Payor Plan Phone Number Payor Plan Fax Number Effective Dates    PO  [phone]  1/1/2021 - None Entered    [place] [address]       Subscriber Name Subscriber Birth Date Member ID       DA FALK 1965 [id number]                 Emergency Contacts      (Rel.) Home Phone Work Phone Mobile Phone    CAROL FALK (Spouse) [phone] -- --            Vital Signs (last day)     Date/Time Temp Temp src Pulse Resp BP Patient Position SpO2    02/04/22 0357 -- -- 79 -- -- -- 94    02/04/22 0305 97.6 (36.4) Oral 92 16 116/70 Lying 95    02/04/22 0131 -- -- 81 -- -- -- --    02/04/22 0024 -- -- 85 -- -- -- 97    02/03/22 2308 97.5 (36.4) Oral 88 16 124/76 Lying 94    02/03/22 2006 -- -- -- -- -- -- 95    " 02/03/22 1901 97.2 (36.2) Oral 89 18 127/73 Sitting 95    02/03/22 1740 -- -- 83 -- -- -- --    02/03/22 1518 97.1 (36.2) Oral 97 20 110/64 Sitting 94    02/03/22 1319 -- -- 112 -- -- -- --    02/03/22 1200 -- -- 103 18 -- -- 95    02/03/22 1057 98.1 (36.7) Oral 97 18 121/71 Sitting 93    02/03/22 0842 97 (36.1) Oral 94 18 123/70 Sitting 92    02/03/22 0502 -- -- 86 20 -- -- 93    02/03/22 0344 97 (36.1) Oral 94 18 120/60 Lying 94    02/03/22 0248 -- -- -- -- -- -- 93    02/03/22 0143 -- -- 75 -- -- -- --          Oxygen Therapy (last day)     Date/Time SpO2 Device (Oxygen Therapy) Flow (L/min) Oxygen Concentration (%) ETCO2 (mmHg)    02/04/22 0357 94 heated; high-flow nasal cannula; humidified 60 94 --    02/04/22 0305 95 heated; high-flow nasal cannula; humidified 60 94 --    02/04/22 0024 97 heated; high-flow nasal cannula; humidified 60 94 --    02/03/22 2308 94 heated; high-flow nasal cannula; humidified 60 94 --    02/03/22 2006 95 heated; high-flow nasal cannula; humidified 60 94 --    02/03/22 2001 -- heated; high-flow nasal cannula; humidified 60 94 --    02/03/22 1901 95 heated; high-flow nasal cannula; humidified 60 94 --    02/03/22 1518 94 heated; high-flow nasal cannula 60 94 --    02/03/22 1200 95 heated; high-flow nasal cannula; humidified 60 95  --    02/03/22 1057 93 heated; high-flow nasal cannula 60 100 --    02/03/22 0842 92 -- 60 100 --    02/03/22 0502 93 heated; high-flow nasal cannula; humidified 60 100 --    02/03/22 0344 94 high-flow nasal cannula 60 -- --    02/03/22 0248 93 high-flow nasal cannula 60 100 --          Current Facility-Administered Medications   Medication Dose Route Frequency Provider Last Rate Last Admin   • acetaminophen (TYLENOL) tablet 650 mg  650 mg Oral Q4H PRN Socorro Calderon APRN        Or   • acetaminophen (TYLENOL) suppository 650 mg  650 mg Rectal Q4H PRN Socorro Calderon APRN       • albuterol sulfate HFA (PROVENTIL HFA;VENTOLIN HFA;PROAIR HFA) inhaler 2 puff   2 puff Inhalation 4x Daily - RT Socorro Calderon APRN   2 puff at 02/04/22 0507   • atorvastatin (LIPITOR) tablet 40 mg  40 mg Oral Nightly Socorro Calderon APRN   40 mg at 02/03/22 2047   • baricitinib (OLUMIANT) tablet 4 mg  4 mg Oral Daily Socorro Calderon APRN   4 mg at 02/03/22 1213   • benzonatate (TESSALON) capsule 100 mg  100 mg Oral TID PRN Socorro Calderon APRN       • budesonide-formoterol (SYMBICORT) 160-4.5 MCG/ACT inhaler 2 puff  2 puff Inhalation BID - RT Socorro Calderon APRN   2 puff at 02/03/22 1819   • calcium carbonate (TUMS) chewable tablet 500 mg (200 mg elemental)  2 tablet Oral BID PRN Socorro Calderon APRN   2 tablet at 02/03/22 0840   • Canagliflozin (INVOKANA) 300 MG tablet tablet 300 mg  300 mg Oral Daily Socorro Calderon APRN   300 mg at 02/03/22 0840   • dexamethasone (DECADRON) tablet 6 mg  6 mg Oral Q12H Socorro Calderon APRN   6 mg at 02/03/22 2047    Or   • dexamethasone (DECADRON) injection 6 mg  6 mg Intravenous Q12H Socorro Calderon APRN       • dextromethorphan polistirex ER (DELSYM) 30 MG/5ML oral suspension 60 mg  60 mg Oral Q12H PRN Socorro Calderon APRN       • dextrose (D50W) (25 g/50 mL) IV injection 25 g  25 g Intravenous Q15 Min PRN Socorro Calderon APRN       • dextrose (GLUTOSE) oral gel 15 g  15 g Oral Q15 Min PRN Socorro Calderon APRN       • enoxaparin (LOVENOX) syringe 40 mg  40 mg Subcutaneous Q12H Ander Machado APRN   40 mg at 02/03/22 1700   • glucagon (human recombinant) (GLUCAGEN DIAGNOSTIC) injection 1 mg  1 mg Subcutaneous Q15 Min PRN Socorro Calderon APRN       • insulin aspart (novoLOG) injection 0-9 Units  0-9 Units Subcutaneous TID AC Socorro Calderon APRN   6 Units at 02/03/22 1636   • ipratropium (ATROVENT HFA) inhaler 2 puff  2 puff Inhalation 4x Daily - RT Socorro Calderon APRN   2 puff at 02/04/22 0507   • loperamide (IMODIUM) capsule 2 mg  2 mg Oral 4x Daily PRN Socorro Calderon APRN       • losartan (COZAAR) tablet 25 mg  25 mg Oral  Daily Socorro Calderon APRN   25 mg at 02/03/22 0840   • melatonin tablet 5.25 mg  5.25 mg Oral Nightly PRN Socorro Calderon APRN       • ondansetron (ZOFRAN) tablet 4 mg  4 mg Oral Q6H PRN Socorro Calderon APRN   4 mg at 02/02/22 0337    Or   • ondansetron (ZOFRAN) injection 4 mg  4 mg Intravenous Q6H PRN Socorro Calderon APRN       • pantoprazole (PROTONIX) EC tablet 40 mg  40 mg Oral Q AM Ander Machado APRN   40 mg at 02/04/22 0506   • Pharmacy Consult - Pharmacy to dose   Does not apply Continuous PRN Socorro Calderon APRN       • sodium chloride 0.9 % flush 10 mL  10 mL Intravenous PRN Johnathan Soares MD       • sodium chloride 0.9 % flush 10 mL  10 mL Intravenous Q12H Socorro Calderon APRN   10 mL at 02/03/22 2047   • sodium chloride 0.9 % flush 10 mL  10 mL Intravenous PRN Socorro Calderon APRN            Physician Progress Notes (last 24 hours)      Ander Machado APRN at 02/03/22 1322     Attestation signed by Gene Tariq MD at 02/03/22 1502    I personally evaluated and examined the patient in conjunction with FLORENCE Menchaca and agree with the assessment, treatment plan, and disposition of the patient as recorded.  Cardiovascular: Normal S1 and S2.  Lungs: Distant breath sounds bilaterally.                        [facility] Services  INPATIENT PROGRESS NOTE    Length of Stay: 4  Date of Admission: 1/30/2022  Primary Care Physician: Bartolo Diaz APRN    Subjective   Chief Complaint: shortness of breath  HPI:  56 year old male with a history of DMII, HTN, and HLD who is admitted with acute respiratory failure due to COVID-19 pneumonia.  He is on Airvo at 60 LPM at 100% FiO2.     Review of Systems   Constitutional: Negative for chills and fever.   Respiratory: Positive for cough. Negative for shortness of breath.    Cardiovascular: Negative for chest pain.   Gastrointestinal: Negative for abdominal pain,  nausea and vomiting.   All other systems reviewed and are negative.       All pertinent negatives and positives are as above. All other systems have been reviewed and are negative unless otherwise stated.     Objective    Temp:  [97 °F (36.1 °C)-98.1 °F (36.7 °C)] 98.1 °F (36.7 °C)  Heart Rate:  [] 112  Resp:  [18-20] 18  BP: (115-131)/(58-71) 121/71    Physical Exam  Vitals reviewed.   Constitutional:       Appearance: Normal appearance. He is ill-appearing.   HENT:      Head: Normocephalic and atraumatic.   Cardiovascular:      Rate and Rhythm: Normal rate and regular rhythm.   Pulmonary:      Effort: Pulmonary effort is normal.      Breath sounds: Normal breath sounds.   Abdominal:      General: There is no distension.      Palpations: Abdomen is soft.      Tenderness: There is no abdominal tenderness.   Musculoskeletal:         General: No swelling or deformity.   Skin:     General: Skin is warm and dry.   Neurological:      General: No focal deficit present.      Mental Status: He is alert and oriented to person, place, and time.       Results Review:  I have reviewed the labs, radiology results, and diagnostic studies.    Laboratory Data:   Results from last 7 days   Lab Units 02/03/22  0624 02/02/22  0547 02/01/22  0609   SODIUM mmol/L 140 140 139   POTASSIUM mmol/L 4.6 4.4 4.4   CHLORIDE mmol/L 101 102 100   CO2 mmol/L 21.0* 23.0 25.0   BUN mg/dL 39* 36* 36*   CREATININE mg/dL 0.82 0.87 1.04   GLUCOSE mg/dL 258* 276* 247*   CALCIUM mg/dL 8.9 9.2 8.8   BILIRUBIN mg/dL 0.6 0.5 0.4   ALK PHOS U/L 117 106 98   ALT (SGPT) U/L 43* 35 34   AST (SGOT) U/L 78* 73* 79*   ANION GAP mmol/L 18.0* 15.0 14.0     Estimated Creatinine Clearance: 127.9 mL/min (by C-G formula based on SCr of 0.82 mg/dL).          Results from last 7 days   Lab Units 02/03/22  0624 02/02/22  0547 02/01/22  0609 01/31/22  0805 01/30/22  1141   WBC 10*3/mm3 4.83 5.36 5.17 3.30* 2.33*   HEMOGLOBIN g/dL 15.1 15.0 15.1 15.1 15.0   HEMATOCRIT %  44.0 44.4 44.2 44.2 43.6   PLATELETS 10*3/mm3 139* 147 132* 103* 80*           Culture Data:   No results found for: BLOODCX  No results found for: URINECX  No results found for: RESPCX  No results found for: WOUNDCX  No results found for: STOOLCX  No components found for: BODYFLD    Radiology Data:   Imaging Results (Last 24 Hours)     ** No results found for the last 24 hours. **          I have reviewed the patient's current medications.     Assessment/Plan     Active Hospital Problems    Diagnosis    • Acute respiratory failure with hypoxia (HCC)    • Pneumonia due to COVID-19 virus    • COVID-19 virus detected    HTN  DMII    Plan:    Supplemental oxygen, currently Airvo 60 LPM at 100% FiO2, wean as tolerated  Remdesivir not indicated due to duration of illness  Decadron day 5  Baricitinib day 4  Trend inflammatory markers, improved  Albuterol MDI, Atrovent MDI, Symbicort  Incentive spirometry, OPEP  BP control: Losartan  Glucose control: Invokana, SSI  PT/OT  VTE PPx: Lovenox BID      I confirmed that the patient's Advance Care Plan is present, code status is documented, or surrogate decision maker is listed in the patient's medical record.     The patient was evaluated during the global COVID-19 pandemic, and the diagnosis was suspected/considered upon their initial presentation.  Evaluation, treatment, and testing were consistent with current guidelines for patients who present with complaints or symptoms that may be related to COVID-19.            This document has been electronically signed by FLORENCE Lemos on February 3, 2022 13:23 CST       Electronically signed by Gene Tariq MD at 02/03/22 1502       Medical Student Notes (last 24 hours)  Notes from 02/03/22 0829 through 02/04/22 0829   No notes of this type exist for this encounter.         Consult Notes (last 24 hours)  Notes from 02/03/22 0829 through 02/04/22 0829   No notes of this type exist for this encounter.

## 2022-02-05 LAB
ALBUMIN SERPL-MCNC: 3.9 G/DL (ref 3.5–5.2)
ALBUMIN/GLOB SERPL: 1.2 G/DL
ALP SERPL-CCNC: 126 U/L (ref 39–117)
ALT SERPL W P-5'-P-CCNC: 62 U/L (ref 1–41)
ANION GAP SERPL CALCULATED.3IONS-SCNC: 17 MMOL/L (ref 5–15)
AST SERPL-CCNC: 63 U/L (ref 1–40)
BASOPHILS # BLD AUTO: 0.01 10*3/MM3 (ref 0–0.2)
BASOPHILS NFR BLD AUTO: 0.1 % (ref 0–1.5)
BILIRUB SERPL-MCNC: 0.7 MG/DL (ref 0–1.2)
BUN SERPL-MCNC: 35 MG/DL (ref 6–20)
BUN/CREAT SERPL: 44.9 (ref 7–25)
CALCIUM SPEC-SCNC: 9.5 MG/DL (ref 8.6–10.5)
CHLORIDE SERPL-SCNC: 100 MMOL/L (ref 98–107)
CO2 SERPL-SCNC: 20 MMOL/L (ref 22–29)
CREAT SERPL-MCNC: 0.78 MG/DL (ref 0.76–1.27)
CRP SERPL-MCNC: 0.52 MG/DL (ref 0–0.5)
DEPRECATED RDW RBC AUTO: 41.4 FL (ref 37–54)
EOSINOPHIL # BLD AUTO: 0 10*3/MM3 (ref 0–0.4)
EOSINOPHIL NFR BLD AUTO: 0 % (ref 0.3–6.2)
ERYTHROCYTE [DISTWIDTH] IN BLOOD BY AUTOMATED COUNT: 13.2 % (ref 12.3–15.4)
GFR SERPL CREATININE-BSD FRML MDRD: 104 ML/MIN/1.73
GLOBULIN UR ELPH-MCNC: 3.2 GM/DL
GLUCOSE BLDC GLUCOMTR-MCNC: 213 MG/DL (ref 70–130)
GLUCOSE BLDC GLUCOMTR-MCNC: 230 MG/DL (ref 70–130)
GLUCOSE BLDC GLUCOMTR-MCNC: 267 MG/DL (ref 70–130)
GLUCOSE BLDC GLUCOMTR-MCNC: 274 MG/DL (ref 70–130)
GLUCOSE SERPL-MCNC: 284 MG/DL (ref 65–99)
HCT VFR BLD AUTO: 45.9 % (ref 37.5–51)
HGB BLD-MCNC: 15.7 G/DL (ref 13–17.7)
IMM GRANULOCYTES # BLD AUTO: 0.05 10*3/MM3 (ref 0–0.05)
IMM GRANULOCYTES NFR BLD AUTO: 0.6 % (ref 0–0.5)
LYMPHOCYTES # BLD AUTO: 0.44 10*3/MM3 (ref 0.7–3.1)
LYMPHOCYTES NFR BLD AUTO: 5.1 % (ref 19.6–45.3)
MCH RBC QN AUTO: 29.5 PG (ref 26.6–33)
MCHC RBC AUTO-ENTMCNC: 34.2 G/DL (ref 31.5–35.7)
MCV RBC AUTO: 86.3 FL (ref 79–97)
MONOCYTES # BLD AUTO: 0.42 10*3/MM3 (ref 0.1–0.9)
MONOCYTES NFR BLD AUTO: 4.9 % (ref 5–12)
NEUTROPHILS NFR BLD AUTO: 7.7 10*3/MM3 (ref 1.7–7)
NEUTROPHILS NFR BLD AUTO: 89.3 % (ref 42.7–76)
NRBC BLD AUTO-RTO: 0 /100 WBC (ref 0–0.2)
PLATELET # BLD AUTO: 180 10*3/MM3 (ref 140–450)
PMV BLD AUTO: 10.9 FL (ref 6–12)
POTASSIUM SERPL-SCNC: 5.1 MMOL/L (ref 3.5–5.2)
PROT SERPL-MCNC: 7.1 G/DL (ref 6–8.5)
RBC # BLD AUTO: 5.32 10*6/MM3 (ref 4.14–5.8)
SODIUM SERPL-SCNC: 137 MMOL/L (ref 136–145)
WBC NRBC COR # BLD: 8.62 10*3/MM3 (ref 3.4–10.8)

## 2022-02-05 PROCEDURE — 25010000002 ENOXAPARIN PER 10 MG: Performed by: NURSE PRACTITIONER

## 2022-02-05 PROCEDURE — 63710000001 INSULIN ASPART PER 5 UNITS: Performed by: NURSE PRACTITIONER

## 2022-02-05 PROCEDURE — 82962 GLUCOSE BLOOD TEST: CPT

## 2022-02-05 PROCEDURE — 63710000001 INSULIN DETEMIR PER 5 UNITS: Performed by: NURSE PRACTITIONER

## 2022-02-05 PROCEDURE — 94799 UNLISTED PULMONARY SVC/PX: CPT

## 2022-02-05 PROCEDURE — 80053 COMPREHEN METABOLIC PANEL: CPT | Performed by: NURSE PRACTITIONER

## 2022-02-05 PROCEDURE — 85025 COMPLETE CBC W/AUTO DIFF WBC: CPT | Performed by: NURSE PRACTITIONER

## 2022-02-05 PROCEDURE — 86140 C-REACTIVE PROTEIN: CPT | Performed by: NURSE PRACTITIONER

## 2022-02-05 PROCEDURE — 94760 N-INVAS EAR/PLS OXIMETRY 1: CPT

## 2022-02-05 PROCEDURE — 63710000001 DEXAMETHASONE PER 0.25 MG: Performed by: NURSE PRACTITIONER

## 2022-02-05 PROCEDURE — 97530 THERAPEUTIC ACTIVITIES: CPT

## 2022-02-05 RX ADMIN — INSULIN ASPART 12 UNITS: 100 INJECTION, SOLUTION INTRAVENOUS; SUBCUTANEOUS at 11:55

## 2022-02-05 RX ADMIN — ALBUTEROL SULFATE 2 PUFF: 90 AEROSOL, METERED RESPIRATORY (INHALATION) at 06:42

## 2022-02-05 RX ADMIN — CANAGLIFLOZIN 300 MG: 300 TABLET, FILM COATED ORAL at 08:58

## 2022-02-05 RX ADMIN — IPRATROPIUM BROMIDE 2 PUFF: 17 AEROSOL, METERED RESPIRATORY (INHALATION) at 15:07

## 2022-02-05 RX ADMIN — INSULIN DETEMIR 20 UNITS: 100 INJECTION, SOLUTION SUBCUTANEOUS at 20:29

## 2022-02-05 RX ADMIN — BARICITINIB 4 MG: 2 TABLET, FILM COATED ORAL at 09:17

## 2022-02-05 RX ADMIN — DEXAMETHASONE 6 MG: 2 TABLET ORAL at 20:28

## 2022-02-05 RX ADMIN — IPRATROPIUM BROMIDE 2 PUFF: 17 AEROSOL, METERED RESPIRATORY (INHALATION) at 20:16

## 2022-02-05 RX ADMIN — ATORVASTATIN CALCIUM 40 MG: 40 TABLET, FILM COATED ORAL at 20:28

## 2022-02-05 RX ADMIN — INSULIN ASPART 8 UNITS: 100 INJECTION, SOLUTION INTRAVENOUS; SUBCUTANEOUS at 08:57

## 2022-02-05 RX ADMIN — BUDESONIDE AND FORMOTEROL FUMARATE DIHYDRATE 2 PUFF: 160; 4.5 AEROSOL RESPIRATORY (INHALATION) at 06:42

## 2022-02-05 RX ADMIN — PANTOPRAZOLE SODIUM 40 MG: 40 TABLET, DELAYED RELEASE ORAL at 05:20

## 2022-02-05 RX ADMIN — LOSARTAN POTASSIUM 25 MG: 25 TABLET, FILM COATED ORAL at 08:59

## 2022-02-05 RX ADMIN — ALBUTEROL SULFATE 2 PUFF: 90 AEROSOL, METERED RESPIRATORY (INHALATION) at 20:16

## 2022-02-05 RX ADMIN — SODIUM CHLORIDE, PRESERVATIVE FREE 10 ML: 5 INJECTION INTRAVENOUS at 20:29

## 2022-02-05 RX ADMIN — SODIUM CHLORIDE, PRESERVATIVE FREE 10 ML: 5 INJECTION INTRAVENOUS at 08:59

## 2022-02-05 RX ADMIN — INSULIN ASPART 8 UNITS: 100 INJECTION, SOLUTION INTRAVENOUS; SUBCUTANEOUS at 17:28

## 2022-02-05 RX ADMIN — IPRATROPIUM BROMIDE 2 PUFF: 17 AEROSOL, METERED RESPIRATORY (INHALATION) at 10:23

## 2022-02-05 RX ADMIN — ALBUTEROL SULFATE 2 PUFF: 90 AEROSOL, METERED RESPIRATORY (INHALATION) at 10:22

## 2022-02-05 RX ADMIN — ALBUTEROL SULFATE 2 PUFF: 90 AEROSOL, METERED RESPIRATORY (INHALATION) at 15:07

## 2022-02-05 RX ADMIN — BUDESONIDE AND FORMOTEROL FUMARATE DIHYDRATE 2 PUFF: 160; 4.5 AEROSOL RESPIRATORY (INHALATION) at 20:16

## 2022-02-05 RX ADMIN — ENOXAPARIN SODIUM 40 MG: 40 INJECTION SUBCUTANEOUS at 05:20

## 2022-02-05 RX ADMIN — DEXAMETHASONE 6 MG: 2 TABLET ORAL at 08:58

## 2022-02-05 RX ADMIN — IPRATROPIUM BROMIDE 2 PUFF: 17 AEROSOL, METERED RESPIRATORY (INHALATION) at 06:42

## 2022-02-05 NOTE — THERAPY TREATMENT NOTE
Patient Name: Louis Brooks  : 1965    MRN: [id number]                              Today's Date: 2022       Admit Date: 2022    Visit Dx:     ICD-10-CM ICD-9-CM   1. Pneumonia of both lungs due to infectious organism, unspecified part of lung  J18.9 483.8   2. Acute respiratory failure with hypoxia ([facility])  J96.01 518.81   3. Pneumonia due to COVID-19 virus  U07.1 480.8    J12.82 079.89   4. Impaired functional mobility, balance, gait, and endurance  Z74.09 V49.89     Patient Active Problem List   Diagnosis   • Type 2 diabetes mellitus without complication, without long-term current use of insulin ([facility])   • Elevated BP without diagnosis of hypertension   • Mixed hyperlipidemia   • Class 2 severe obesity due to excess calories with serious comorbidity and body mass index (BMI) of 36.0 to 36.9 in adult ([facility])   • Gastroesophageal reflux disease without esophagitis   • Essential hypertension   • Acute respiratory failure with hypoxia ([facility])   • Pneumonia due to COVID-19 virus   • COVID-19 virus detected     Past Medical History:   Diagnosis Date   • Arthritis    • Class 2 severe obesity due to excess calories with serious comorbidity and body mass index (BMI) of 36.0 to 36.9 in adult ([facility]) 2021   • GERD (gastroesophageal reflux disease)    • Hypertension    • Mixed hyperlipidemia 2021   • Pneumonia due to COVID-19 virus 2022   • Type 2 diabetes mellitus without complication, without long-term current use of insulin ([facility]) 2021     History reviewed. No pertinent surgical history.   General Information     Row Name 22 1420          Physical Therapy Time and Intention    Document Type therapy note (daily note)  -LR     Mode of Treatment individual therapy; physical therapy  -LR     Row Name 22 1420          General Information    Patient Profile Reviewed yes  -LR     Existing Precautions/Restrictions oxygen therapy device and L/min  -LR     Row Name 22 1424           Cognition    Orientation Status (Cognition) oriented x 4  -LR     Row Name 02/05/22 1420          Safety Issues, Functional Mobility    Safety Issues Affecting Function (Mobility) insight into deficits/self-awareness  -LR     Impairments Affecting Function (Mobility) shortness of breath; endurance/activity tolerance  -LR           User Key  (r) = Recorded By, (t) = Taken By, (c) = Cosigned By    Initials Name Provider Type    LR Leonardo Sandoval Physical Therapist               Mobility     Row Name 02/05/22 1420          Bed Mobility    Bed Mobility supine-sit; sit-supine  -LR     Supine-Sit [place] (Bed Mobility) independent  -LR     Sit-Supine [place] (Bed Mobility) independent  -LR     Row Name 02/05/22 1420          Sit-Stand Transfer    Sit-Stand [place] (Transfers) independent  -LR     Row Name 02/05/22 1420          Gait/Stairs (Locomotion)    [place] Level (Gait) independent  -LR     Distance in Feet (Gait) 8'x1  -LR           User Key  (r) = Recorded By, (t) = Taken By, (c) = Cosigned By    Initials Name Provider Type    LR Leonardo Sandoval Physical Therapist               Obj/Interventions    No documentation.                Goals/Plan     Row Name 02/05/22 1420          Bed Mobility Goal 1 (PT)    Activity/Assistive Device (Bed Mobility Goal 1, PT) sit to supine/supine to sit  -LR     [place] Level/Cues Needed (Bed Mobility Goal 1, PT) independent  -LR     Time Frame (Bed Mobility Goal 1, PT) by discharge  -LR     Strategies/Barriers (Bed Mobility Goal 1, PT) HOB flat, no bed rails.  -LR     Progress/Outcomes (Bed Mobility Goal 1, PT) goal met   -LR     Row Name 02/05/22 1420          Gait Training Goal 1 (PT)    Activity/Assistive Device (Gait Training Goal 1, PT) gait (walking locomotion)  -LR     [place] Level (Gait Training Goal 1, PT) independent  -LR     Distance (Gait Training Goal 1, PT) 35'x3.  -LR     Time Frame (Gait Training Goal 1, PT) by discharge  -LR      Strategies/Barriers (Gait Training Goal 1, PT) Maintain SpO2 >90%.  -LR     Progress/Outcome (Gait Training Goal 1, PT) goal not met  -LR     Row Name 02/05/22 1420          Problem Specific Goal 1 (PT)    Problem Specific Goal 1 (PT) Score 28/28 on Tinetti fall risk assessment.  -LR     Time Frame (Problem Specific Goal 1, PT) by discharge  -LR     Strategies/Barriers (Problem Specific Goal 1, PT) Maintain SpO2 >90%.  -LR     Progress/Outcome (Problem Specific Goal 1, PT) goal not met  -LR           User Key  (r) = Recorded By, (t) = Taken By, (c) = Cosigned By    Initials Name Provider Type    LR Leonardo Sandoval Physical Therapist               Clinical Impression     Row Name 02/05/22 1420          Pain Scale: Numbers Pre/Post-Treatment    Pretreatment Pain Rating 0/10 - no pain  -LR     Posttreatment Pain Rating 0/10 - no pain  -LR     Row Name 02/05/22 1420          Plan of Care Review    Plan of Care Reviewed With patient  -LR     Outcome Summary PT tx completed on this date. Patient initally hesitant about working with PT. PT educated patient proning, stacking breathing, POC, and ways to increase activity tolerance. Patient Independent with sit>supine transfer, sit<>stand transfer and ambulation 8'x1. After education patient willing to try prone exercise. Patient did report he prones at not but not much during the day. In prone PT facilitated up on all 4s 1x3. SPO2% dropped to 88% with 2-3 minute recovery to 93%. Patient then performed quadraped position for 20 seconds. With SPO2% again dropping to 88% with 2 minute recovery back to 93%. Patient reports he would trial some of these exercises later tonight. 1 goal met on this date. Continue prone activity and start time standing trials next tx.  -LR     Row Name 02/05/22 1420          Vital Signs    Pretreatment Heart Rate (beats/min) 95  -LR     Intratreatment Heart Rate (beats/min) 98  -LR     Posttreatment Heart Rate (beats/min) 92  -LR     Pre SpO2 (%) 96   -LR     O2 Delivery Pre Treatment other (see comments)  Airvo 55L 93%  -LR     Intra SpO2 (%) 88  -LR     O2 Delivery Intra Treatment other (see comments)  -LR     Post SpO2 (%) 96  -LR     Pre Patient Position Supine  -LR     Intra Patient Position Prone  -LR     Post Patient Position Sitting  -LR     Row Name 02/05/22 1420          Positioning and Restraints    Pre-Treatment Position sitting in chair/recliner  -LR     Post Treatment Position bed  -LR     In Bed notified nsg; sitting EOB; encouraged to call for assist; call light within reach  -LR           User Key  (r) = Recorded By, (t) = Taken By, (c) = Cosigned By    Initials Name Provider Type    Leonardo Vargas Physical Therapist               Outcome Measures     Row Name 02/05/22 1420          How much help from another person do you currently need...    Moving from lying on back to sitting on the side of a flat bed without bedrails? 4  -LR     Moving to and from a bed to a chair (including a wheelchair)? 4  -LR     Standing up from a chair using your arms (e.g., wheelchair, bedside chair)? 4  -LR     Climbing 3-5 steps with a railing? 3  -LR     To walk in hospital room? 4  -LR           User Key  (r) = Recorded By, (t) = Taken By, (c) = Cosigned By    Initials Name Provider Type    Leonardo Vargas Physical Therapist                             Physical Therapy Education                 Title: PT OT SLP Therapies (In Progress)     Topic: Physical Therapy (In Progress)     Point: Mobility training (Done)     Learning Progress Summary           Patient Acceptance, E, VU by CZ at 2/1/2022 4161    Comment: PT POC, goals, breathing technique.                   Point: Home exercise program (Not Started)     Learner Progress:  Not documented in this visit.          Point: Body mechanics (Not Started)     Learner Progress:  Not documented in this visit.          Point: Precautions (Not Started)     Learner Progress:  Not documented in this visit.                       User Key     Initials Effective Dates Name Provider Type Discipline    CZ 06/16/21 -  Teto Storm, PT Physical Therapist PT              PT Recommendation and Plan     Plan of Care Reviewed With: patient  Outcome Summary: PT tx completed on this date. Patient initally hesitant about working with PT. PT educated patient proning, stacking breathing, POC, and ways to increase activity tolerance. Patient Independent with sit>supine transfer, sit<>stand transfer and ambulation 8'x1. After education patient willing to try prone exercise. Patient did report he prones at not but not much during the day. In prone PT facilitated up on all 4s 1x3. SPO2% dropped to 88% with 2-3 minute recovery to 93%. Patient then performed quadraped position for 20 seconds. With SPO2% again dropping to 88% with 2 minute recovery back to 93%. Patient reports he would trial some of these exercises later tonight. 1 goal met on this date. Continue prone activity and start time standing trials next tx.     Time Calculation:    PT Charges     Row Name 02/05/22 1535             Time Calculation    Start Time 1420  -LR      Stop Time 1500  -LR      Time Calculation (min) 40 min  -LR      PT Received On 02/05/22  -LR              Timed Charges    65384 - PT Therapeutic Activity Minutes 45  -LR              Total Minutes    Timed Charges Total Minutes 45  -LR       Total Minutes 45  -LR            User Key  (r) = Recorded By, (t) = Taken By, (c) = Cosigned By    Initials Name Provider Type    LR Leonardo Sandoval Physical Therapist              Therapy Charges for Today     Code Description Service Date Service Provider Modifiers Qty    16800640757 HC PT THERAPEUTIC ACT EA 15 MIN 2/5/2022 Leonardo Sandoval GP 3          PT G-Codes  Outcome Measure Options: AM-PAC 6 Clicks Basic Mobility (PT), [place]  AM-PAC 6 Clicks Score (PT): 23    Leonardo Sandoval  2/5/2022

## 2022-02-05 NOTE — PLAN OF CARE
Goal Outcome Evaluation:  Plan of Care Reviewed With: patient           Outcome Summary: PT tx completed on this date. Patient initally hesitant about working with PT. PT educated patient proning, stacking breathing, POC, and ways to increase activity tolerance. Patient Independent with sit>supine transfer, sit<>stand transfer and ambulation 8'x1. After education patient willing to try prone exercise. Patient did report he prones at not but not much during the day. In prone PT facilitated up on all 4s 1x3. SPO2% dropped to 88% with 2-3 minute recovery to 93%. Patient then performed quadraped position for 20 seconds. With SPO2% again dropping to 88% with 2 minute recovery back to 93%. Patient reports he would trial some of these exercises later tonight. 1 goal met on this date. Continue prone activity and start time standing trials next tx.

## 2022-02-05 NOTE — PROGRESS NOTES
[facility] Medicine Services  INPATIENT PROGRESS NOTE    Length of Stay: 6  Date of Admission: 1/30/2022  Primary Care Physician: Bartolo Daiz APRN    Subjective   Chief Complaint: shortness of breath  HPI:  56 year old male with a history of DMII, HTN, and HLD who is admitted with acute respiratory failure due to COVID-19 pneumonia.  He is weaning slow and currently on Airvo at 55 LPM at 92% FiO2.     Review of Systems   Constitutional: Negative for chills and fever.   Respiratory: Positive for cough. Negative for shortness of breath.    Cardiovascular: Negative for chest pain.   Gastrointestinal: Negative for abdominal pain, nausea and vomiting.   All other systems reviewed and are negative.       All pertinent negatives and positives are as above. All other systems have been reviewed and are negative unless otherwise stated.     Objective    Temp:  [96.1 °F (35.6 °C)-97.4 °F (36.3 °C)] 96.1 °F (35.6 °C)  Heart Rate:  [] 100  Resp:  [16-18] 18  BP: (113-145)/(66-83) 113/66    Physical Exam  Vitals reviewed.   Constitutional:       Appearance: Normal appearance. He is not ill-appearing.   HENT:      Head: Normocephalic and atraumatic.   Cardiovascular:      Rate and Rhythm: Normal rate and regular rhythm.   Pulmonary:      Effort: Pulmonary effort is normal.      Breath sounds: Normal breath sounds.   Abdominal:      General: There is no distension.      Palpations: Abdomen is soft.      Tenderness: There is no abdominal tenderness.   Musculoskeletal:         General: No swelling or deformity.   Skin:     General: Skin is warm and dry.   Neurological:      General: No focal deficit present.      Mental Status: He is alert and oriented to person, place, and time.       Results Review:  I have reviewed the labs, radiology results, and diagnostic studies.    Laboratory Data:   Results from last 7 days   Lab Units 02/05/22  0642 02/04/22  0655  02/03/22  0624   SODIUM mmol/L 137 140 140   POTASSIUM mmol/L 5.1 5.2 4.6   CHLORIDE mmol/L 100 102 101   CO2 mmol/L 20.0* 21.0* 21.0*   BUN mg/dL 35* 38* 39*   CREATININE mg/dL 0.78 0.86 0.82   GLUCOSE mg/dL 284* 234* 258*   CALCIUM mg/dL 9.5 9.0 8.9   BILIRUBIN mg/dL 0.7 0.6 0.6   ALK PHOS U/L 126* 126* 117   ALT (SGPT) U/L 62* 54* 43*   AST (SGOT) U/L 63* 74* 78*   ANION GAP mmol/L 17.0* 17.0* 18.0*     Estimated Creatinine Clearance: 135.1 mL/min (by C-G formula based on SCr of 0.78 mg/dL).          Results from last 7 days   Lab Units 02/05/22  0642 02/04/22  0655 02/03/22  0624 02/02/22  0547 02/01/22  0609   WBC 10*3/mm3 8.62 5.53 4.83 5.36 5.17   HEMOGLOBIN g/dL 15.7 15.5 15.1 15.0 15.1   HEMATOCRIT % 45.9 45.5 44.0 44.4 44.2   PLATELETS 10*3/mm3 180 165 139* 147 132*           Culture Data:   No results found for: BLOODCX  No results found for: URINECX  No results found for: RESPCX  No results found for: WOUNDCX  No results found for: STOOLCX  No components found for: BODYFLD    Radiology Data:   Imaging Results (Last 24 Hours)     ** No results found for the last 24 hours. **          I have reviewed the patient's current medications.     Assessment/Plan     Active Hospital Problems    Diagnosis    • Acute respiratory failure with hypoxia (HCC)    • Pneumonia due to COVID-19 virus    • COVID-19 virus detected    HTN  DMII    Plan:    Supplemental oxygen, currently Airvo 55 LPM at 92% FiO2, wean as tolerated  Remdesivir not indicated due to duration of illness  Decadron day 7  Baricitinib day 7  Trend inflammatory markers, improved  Albuterol MDI, Atrovent MDI, Symbicort  Incentive spirometry, OPEP  BP control: Losartan  Glucose control: Invokana, Levemir, SSI 0-24   PT/OT  VTE PPx: Lovenox BID      I confirmed that the patient's Advance Care Plan is present, code status is documented, or surrogate decision maker is listed in the patient's medical record.     The patient was evaluated during the global  COVID-19 pandemic, and the diagnosis was suspected/considered upon their initial presentation.  Evaluation, treatment, and testing were consistent with current guidelines for patients who present with complaints or symptoms that may be related to COVID-19.    Approximately 32 minutes of critical care time were spent managing the patient exclusive of billable procedures.           This document has been electronically signed by FLORENCE Lemos on February 5, 2022 11:30 CST

## 2022-02-06 ENCOUNTER — APPOINTMENT (OUTPATIENT)
Dept: GENERAL RADIOLOGY | Facility: HOSPITAL | Age: 57
End: 2022-02-06

## 2022-02-06 LAB
ALBUMIN SERPL-MCNC: 3.9 G/DL (ref 3.5–5.2)
ALBUMIN/GLOB SERPL: 1.1 G/DL
ALP SERPL-CCNC: 143 U/L (ref 39–117)
ALT SERPL W P-5'-P-CCNC: 57 U/L (ref 1–41)
ANION GAP SERPL CALCULATED.3IONS-SCNC: 12 MMOL/L (ref 5–15)
AST SERPL-CCNC: 62 U/L (ref 1–40)
BASOPHILS # BLD AUTO: 0.01 10*3/MM3 (ref 0–0.2)
BASOPHILS NFR BLD AUTO: 0.1 % (ref 0–1.5)
BILIRUB SERPL-MCNC: 0.7 MG/DL (ref 0–1.2)
BUN SERPL-MCNC: 33 MG/DL (ref 6–20)
BUN/CREAT SERPL: 42.3 (ref 7–25)
CALCIUM SPEC-SCNC: 9.1 MG/DL (ref 8.6–10.5)
CHLORIDE SERPL-SCNC: 95 MMOL/L (ref 98–107)
CO2 SERPL-SCNC: 24 MMOL/L (ref 22–29)
CREAT SERPL-MCNC: 0.78 MG/DL (ref 0.76–1.27)
CRP SERPL-MCNC: 2.12 MG/DL (ref 0–0.5)
DEPRECATED RDW RBC AUTO: 40.7 FL (ref 37–54)
EOSINOPHIL # BLD AUTO: 0.01 10*3/MM3 (ref 0–0.4)
EOSINOPHIL NFR BLD AUTO: 0.1 % (ref 0.3–6.2)
ERYTHROCYTE [DISTWIDTH] IN BLOOD BY AUTOMATED COUNT: 13 % (ref 12.3–15.4)
GFR SERPL CREATININE-BSD FRML MDRD: 104 ML/MIN/1.73
GLOBULIN UR ELPH-MCNC: 3.6 GM/DL
GLUCOSE BLDC GLUCOMTR-MCNC: 176 MG/DL (ref 70–130)
GLUCOSE BLDC GLUCOMTR-MCNC: 263 MG/DL (ref 70–130)
GLUCOSE BLDC GLUCOMTR-MCNC: 282 MG/DL (ref 70–130)
GLUCOSE BLDC GLUCOMTR-MCNC: 283 MG/DL (ref 70–130)
GLUCOSE SERPL-MCNC: 188 MG/DL (ref 65–99)
HCT VFR BLD AUTO: 45.7 % (ref 37.5–51)
HGB BLD-MCNC: 15.7 G/DL (ref 13–17.7)
IMM GRANULOCYTES # BLD AUTO: 0.03 10*3/MM3 (ref 0–0.05)
IMM GRANULOCYTES NFR BLD AUTO: 0.4 % (ref 0–0.5)
LYMPHOCYTES # BLD AUTO: 0.34 10*3/MM3 (ref 0.7–3.1)
LYMPHOCYTES NFR BLD AUTO: 4.5 % (ref 19.6–45.3)
MCH RBC QN AUTO: 29.8 PG (ref 26.6–33)
MCHC RBC AUTO-ENTMCNC: 34.4 G/DL (ref 31.5–35.7)
MCV RBC AUTO: 86.9 FL (ref 79–97)
MONOCYTES # BLD AUTO: 0.32 10*3/MM3 (ref 0.1–0.9)
MONOCYTES NFR BLD AUTO: 4.2 % (ref 5–12)
NEUTROPHILS NFR BLD AUTO: 6.91 10*3/MM3 (ref 1.7–7)
NEUTROPHILS NFR BLD AUTO: 90.7 % (ref 42.7–76)
NRBC BLD AUTO-RTO: 0 /100 WBC (ref 0–0.2)
PLATELET # BLD AUTO: 164 10*3/MM3 (ref 140–450)
PMV BLD AUTO: 10.4 FL (ref 6–12)
POTASSIUM SERPL-SCNC: 4.5 MMOL/L (ref 3.5–5.2)
PROT SERPL-MCNC: 7.5 G/DL (ref 6–8.5)
RBC # BLD AUTO: 5.26 10*6/MM3 (ref 4.14–5.8)
SODIUM SERPL-SCNC: 131 MMOL/L (ref 136–145)
WBC NRBC COR # BLD: 7.62 10*3/MM3 (ref 3.4–10.8)

## 2022-02-06 PROCEDURE — 94799 UNLISTED PULMONARY SVC/PX: CPT

## 2022-02-06 PROCEDURE — 25010000002 ENOXAPARIN PER 10 MG: Performed by: NURSE PRACTITIONER

## 2022-02-06 PROCEDURE — 94760 N-INVAS EAR/PLS OXIMETRY 1: CPT

## 2022-02-06 PROCEDURE — 82962 GLUCOSE BLOOD TEST: CPT

## 2022-02-06 PROCEDURE — 94664 DEMO&/EVAL PT USE INHALER: CPT

## 2022-02-06 PROCEDURE — 80053 COMPREHEN METABOLIC PANEL: CPT | Performed by: NURSE PRACTITIONER

## 2022-02-06 PROCEDURE — 63710000001 DEXAMETHASONE PER 0.25 MG: Performed by: NURSE PRACTITIONER

## 2022-02-06 PROCEDURE — 71045 X-RAY EXAM CHEST 1 VIEW: CPT

## 2022-02-06 PROCEDURE — 86140 C-REACTIVE PROTEIN: CPT | Performed by: NURSE PRACTITIONER

## 2022-02-06 PROCEDURE — 85025 COMPLETE CBC W/AUTO DIFF WBC: CPT | Performed by: NURSE PRACTITIONER

## 2022-02-06 PROCEDURE — 63710000001 INSULIN DETEMIR PER 5 UNITS: Performed by: NURSE PRACTITIONER

## 2022-02-06 PROCEDURE — 63710000001 INSULIN ASPART PER 5 UNITS: Performed by: NURSE PRACTITIONER

## 2022-02-06 RX ADMIN — ALBUTEROL SULFATE 2 PUFF: 90 AEROSOL, METERED RESPIRATORY (INHALATION) at 14:08

## 2022-02-06 RX ADMIN — INSULIN ASPART 12 UNITS: 100 INJECTION, SOLUTION INTRAVENOUS; SUBCUTANEOUS at 12:50

## 2022-02-06 RX ADMIN — IPRATROPIUM BROMIDE 2 PUFF: 17 AEROSOL, METERED RESPIRATORY (INHALATION) at 10:46

## 2022-02-06 RX ADMIN — IPRATROPIUM BROMIDE 2 PUFF: 17 AEROSOL, METERED RESPIRATORY (INHALATION) at 19:56

## 2022-02-06 RX ADMIN — SODIUM CHLORIDE, PRESERVATIVE FREE 10 ML: 5 INJECTION INTRAVENOUS at 20:45

## 2022-02-06 RX ADMIN — IPRATROPIUM BROMIDE 2 PUFF: 17 AEROSOL, METERED RESPIRATORY (INHALATION) at 14:08

## 2022-02-06 RX ADMIN — DEXAMETHASONE 6 MG: 2 TABLET ORAL at 08:48

## 2022-02-06 RX ADMIN — CANAGLIFLOZIN 300 MG: 300 TABLET, FILM COATED ORAL at 08:51

## 2022-02-06 RX ADMIN — BARICITINIB 4 MG: 2 TABLET, FILM COATED ORAL at 08:47

## 2022-02-06 RX ADMIN — INSULIN ASPART 4 UNITS: 100 INJECTION, SOLUTION INTRAVENOUS; SUBCUTANEOUS at 08:47

## 2022-02-06 RX ADMIN — BUDESONIDE AND FORMOTEROL FUMARATE DIHYDRATE 2 PUFF: 160; 4.5 AEROSOL RESPIRATORY (INHALATION) at 06:36

## 2022-02-06 RX ADMIN — LOSARTAN POTASSIUM 25 MG: 25 TABLET, FILM COATED ORAL at 08:48

## 2022-02-06 RX ADMIN — ALBUTEROL SULFATE 2 PUFF: 90 AEROSOL, METERED RESPIRATORY (INHALATION) at 19:56

## 2022-02-06 RX ADMIN — SODIUM CHLORIDE, PRESERVATIVE FREE 10 ML: 5 INJECTION INTRAVENOUS at 08:49

## 2022-02-06 RX ADMIN — BUDESONIDE AND FORMOTEROL FUMARATE DIHYDRATE 2 PUFF: 160; 4.5 AEROSOL RESPIRATORY (INHALATION) at 19:56

## 2022-02-06 RX ADMIN — IPRATROPIUM BROMIDE 2 PUFF: 17 AEROSOL, METERED RESPIRATORY (INHALATION) at 06:36

## 2022-02-06 RX ADMIN — ATORVASTATIN CALCIUM 40 MG: 40 TABLET, FILM COATED ORAL at 20:44

## 2022-02-06 RX ADMIN — INSULIN DETEMIR 20 UNITS: 100 INJECTION, SOLUTION SUBCUTANEOUS at 20:44

## 2022-02-06 RX ADMIN — ALBUTEROL SULFATE 2 PUFF: 90 AEROSOL, METERED RESPIRATORY (INHALATION) at 06:37

## 2022-02-06 RX ADMIN — ENOXAPARIN SODIUM 40 MG: 40 INJECTION SUBCUTANEOUS at 05:02

## 2022-02-06 RX ADMIN — INSULIN ASPART 12 UNITS: 100 INJECTION, SOLUTION INTRAVENOUS; SUBCUTANEOUS at 17:15

## 2022-02-06 RX ADMIN — PANTOPRAZOLE SODIUM 40 MG: 40 TABLET, DELAYED RELEASE ORAL at 05:02

## 2022-02-06 RX ADMIN — ALBUTEROL SULFATE 2 PUFF: 90 AEROSOL, METERED RESPIRATORY (INHALATION) at 10:46

## 2022-02-06 RX ADMIN — DEXAMETHASONE 6 MG: 2 TABLET ORAL at 20:44

## 2022-02-06 NOTE — PLAN OF CARE
Goal Outcome Evaluation:        Patient is resting, has expressed his frustration with being in isolation and not being allowed visitors.  VSS.

## 2022-02-06 NOTE — NURSING NOTE
At 0836, tele called saying pulse ox was reading low, went in and pt was up in room and winded, pulse ox cord loose and changed out. Pulse ox reading 91 when I left. At 0941 tele called again, stating o2 was reading 83%. Went into room, pt was sleep.  Woke pt, he argued with me when I asked him to flip to prone, stating he 'does this all the time.' told him he did not do this and he needed to flip to prone. Increased to 60L and 90% and was 92% when I left the room. RT Thorpe notified, Ander PALM notified. Stat port chest ordered.

## 2022-02-06 NOTE — PROGRESS NOTES
[facility] Medicine Services  INPATIENT PROGRESS NOTE    Length of Stay: 7  Date of Admission: 1/30/2022  Primary Care Physician: Bartolo Diaz APRN    Subjective   Chief Complaint: shortness of breath  HPI:  56 year old male with a history of DMII, HTN, and HLD who is admitted with acute respiratory failure due to COVID-19 pneumonia.   He had an episode of desaturation this morning and Airvo settings were increased again.      Review of Systems   Constitutional: Negative for chills and fever.   Respiratory: Positive for cough. Negative for shortness of breath.    Cardiovascular: Negative for chest pain.   Gastrointestinal: Negative for abdominal pain, nausea and vomiting.   All other systems reviewed and are negative.       All pertinent negatives and positives are as above. All other systems have been reviewed and are negative unless otherwise stated.     Objective    Temp:  [96.6 °F (35.9 °C)-98 °F (36.7 °C)] 98 °F (36.7 °C)  Heart Rate:  [] 99  Resp:  [16-22] 18  BP: (109-124)/(59-96) 109/62    Physical Exam  Vitals reviewed.   Constitutional:       Appearance: Normal appearance. He is not ill-appearing.   HENT:      Head: Normocephalic and atraumatic.   Cardiovascular:      Rate and Rhythm: Normal rate and regular rhythm.   Pulmonary:      Effort: Pulmonary effort is normal.      Breath sounds: Normal breath sounds.   Abdominal:      General: There is no distension.      Palpations: Abdomen is soft.      Tenderness: There is no abdominal tenderness.   Musculoskeletal:         General: No swelling or deformity.   Skin:     General: Skin is warm and dry.   Neurological:      General: No focal deficit present.      Mental Status: He is alert and oriented to person, place, and time.       Results Review:  I have reviewed the labs, radiology results, and diagnostic studies.    Laboratory Data:   Results from last 7 days   Lab Units 02/06/22  0636  02/05/22  0642 02/04/22  0655   SODIUM mmol/L 131* 137 140   POTASSIUM mmol/L 4.5 5.1 5.2   CHLORIDE mmol/L 95* 100 102   CO2 mmol/L 24.0 20.0* 21.0*   BUN mg/dL 33* 35* 38*   CREATININE mg/dL 0.78 0.78 0.86   GLUCOSE mg/dL 188* 284* 234*   CALCIUM mg/dL 9.1 9.5 9.0   BILIRUBIN mg/dL 0.7 0.7 0.6   ALK PHOS U/L 143* 126* 126*   ALT (SGPT) U/L 57* 62* 54*   AST (SGOT) U/L 62* 63* 74*   ANION GAP mmol/L 12.0 17.0* 17.0*     Estimated Creatinine Clearance: 135.1 mL/min (by C-G formula based on SCr of 0.78 mg/dL).          Results from last 7 days   Lab Units 02/06/22  0636 02/05/22  0642 02/04/22  0655 02/03/22  0624 02/02/22  0547   WBC 10*3/mm3 7.62 8.62 5.53 4.83 5.36   HEMOGLOBIN g/dL 15.7 15.7 15.5 15.1 15.0   HEMATOCRIT % 45.7 45.9 45.5 44.0 44.4   PLATELETS 10*3/mm3 164 180 165 139* 147           Culture Data:   No results found for: BLOODCX  No results found for: URINECX  No results found for: RESPCX  No results found for: WOUNDCX  No results found for: STOOLCX  No components found for: BODYFLD    Radiology Data:   Imaging Results (Last 24 Hours)     Procedure Component Value Units Date/Time    XR Chest 1 View [[id number]] Collected: 02/06/22 1025     Updated: 02/06/22 1209    Narrative:      EXAM: 1-VIEW CHEST RADIOGRAPH(S)    INDICATION:  hypoxia, J18.9 Pneumonia, unspecified organism  J96.01 Acute respiratory failure with hypoxia U07.1 COVID-19  J12.82 Pneumonia due to coronavirus disease 2019 Z74.09 Other  reduced mobility    COMPARISON:  Chest radiograph(s) dated 1/30/2022    TECHNIQUE: AP portable radiograph was obtained    FINDINGS:    No medical support devices are identified.  Multifocal patchy  consolidative opacities are decreased in density from the  comparison exam.  No pleural effusion or pneumothorax.  Mediastinal contours are normal. Heart size is within normal  limits for technique.       Impression:        Significant interval decrease in density of multifocal patchy  consolidative opacities  from comparison exam.  Findings likely  represent improving pneumonia.    Electronically signed by:  Jose J Moseley MD  2/6/2022 12:07 PM  CST Workstation: [id number]          I have reviewed the patient's current medications.     Assessment/Plan     Active Hospital Problems    Diagnosis    • Acute respiratory failure with hypoxia (HCC)    • Pneumonia due to COVID-19 virus    • COVID-19 virus detected    HTN  DMII    Plan:    Supplemental oxygen, currently Airvo 60 LPM at 94% FiO2, wean as tolerated  Remdesivir not indicated due to duration of illness  Decadron day 8  Baricitinib day 8  Trend inflammatory markers, improved  Albuterol MDI, Atrovent MDI, Symbicort  Incentive spirometry, OPEP  BP control: Losartan  Glucose control: Invokana, Levemir, SSI 0-24   PT/OT  VTE PPx: Lovenox BID      I confirmed that the patient's Advance Care Plan is present, code status is documented, or surrogate decision maker is listed in the patient's medical record.     The patient was evaluated during the global COVID-19 pandemic, and the diagnosis was suspected/considered upon their initial presentation.  Evaluation, treatment, and testing were consistent with current guidelines for patients who present with complaints or symptoms that may be related to COVID-19.              This document has been electronically signed by FLORENCE Lemos on February 6, 2022 12:30 CST

## 2022-02-06 NOTE — SIGNIFICANT NOTE
02/06/22 1246   OTHER   Discipline physical therapist   Rehab Time/Intention   Session Not Performed patient unavailable for treatment   PT tx attempted. Nsg deferred d/t patient respiratory status. Will f/u as appropriate.

## 2022-02-06 NOTE — PLAN OF CARE
Goal Outcome Evaluation:         Pt is very argumentative about care, all the way max on airvo again, semi prone as pt refuses to completely prone. CXR ordered.

## 2022-02-07 LAB
ALBUMIN SERPL-MCNC: 3.5 G/DL (ref 3.5–5.2)
ALBUMIN/GLOB SERPL: 0.9 G/DL
ALP SERPL-CCNC: 144 U/L (ref 39–117)
ALT SERPL W P-5'-P-CCNC: 51 U/L (ref 1–41)
ANION GAP SERPL CALCULATED.3IONS-SCNC: 13 MMOL/L (ref 5–15)
AST SERPL-CCNC: 66 U/L (ref 1–40)
BASOPHILS # BLD AUTO: 0.02 10*3/MM3 (ref 0–0.2)
BASOPHILS NFR BLD AUTO: 0.3 % (ref 0–1.5)
BILIRUB SERPL-MCNC: 0.6 MG/DL (ref 0–1.2)
BUN SERPL-MCNC: 32 MG/DL (ref 6–20)
BUN/CREAT SERPL: 46.4 (ref 7–25)
CALCIUM SPEC-SCNC: 9 MG/DL (ref 8.6–10.5)
CHLORIDE SERPL-SCNC: 96 MMOL/L (ref 98–107)
CO2 SERPL-SCNC: 23 MMOL/L (ref 22–29)
CREAT SERPL-MCNC: 0.69 MG/DL (ref 0.76–1.27)
CRP SERPL-MCNC: 4.42 MG/DL (ref 0–0.5)
DEPRECATED RDW RBC AUTO: 39.9 FL (ref 37–54)
EOSINOPHIL # BLD AUTO: 0.02 10*3/MM3 (ref 0–0.4)
EOSINOPHIL NFR BLD AUTO: 0.3 % (ref 0.3–6.2)
ERYTHROCYTE [DISTWIDTH] IN BLOOD BY AUTOMATED COUNT: 12.8 % (ref 12.3–15.4)
GFR SERPL CREATININE-BSD FRML MDRD: 119 ML/MIN/1.73
GLOBULIN UR ELPH-MCNC: 3.9 GM/DL
GLUCOSE BLDC GLUCOMTR-MCNC: 220 MG/DL (ref 70–130)
GLUCOSE BLDC GLUCOMTR-MCNC: 229 MG/DL (ref 70–130)
GLUCOSE BLDC GLUCOMTR-MCNC: 256 MG/DL (ref 70–130)
GLUCOSE SERPL-MCNC: 224 MG/DL (ref 65–99)
HCT VFR BLD AUTO: 44.5 % (ref 37.5–51)
HGB BLD-MCNC: 15.6 G/DL (ref 13–17.7)
IMM GRANULOCYTES # BLD AUTO: 0.05 10*3/MM3 (ref 0–0.05)
IMM GRANULOCYTES NFR BLD AUTO: 0.6 % (ref 0–0.5)
LYMPHOCYTES # BLD AUTO: 0.28 10*3/MM3 (ref 0.7–3.1)
LYMPHOCYTES NFR BLD AUTO: 3.6 % (ref 19.6–45.3)
MCH RBC QN AUTO: 30.1 PG (ref 26.6–33)
MCHC RBC AUTO-ENTMCNC: 35.1 G/DL (ref 31.5–35.7)
MCV RBC AUTO: 85.9 FL (ref 79–97)
MONOCYTES # BLD AUTO: 0.26 10*3/MM3 (ref 0.1–0.9)
MONOCYTES NFR BLD AUTO: 3.3 % (ref 5–12)
NEUTROPHILS NFR BLD AUTO: 7.23 10*3/MM3 (ref 1.7–7)
NEUTROPHILS NFR BLD AUTO: 91.9 % (ref 42.7–76)
NRBC BLD AUTO-RTO: 0 /100 WBC (ref 0–0.2)
PLATELET # BLD AUTO: 157 10*3/MM3 (ref 140–450)
PMV BLD AUTO: 11.6 FL (ref 6–12)
POTASSIUM SERPL-SCNC: 4.4 MMOL/L (ref 3.5–5.2)
PROT SERPL-MCNC: 7.4 G/DL (ref 6–8.5)
RBC # BLD AUTO: 5.18 10*6/MM3 (ref 4.14–5.8)
SODIUM SERPL-SCNC: 132 MMOL/L (ref 136–145)
WBC NRBC COR # BLD: 7.86 10*3/MM3 (ref 3.4–10.8)

## 2022-02-07 PROCEDURE — 94799 UNLISTED PULMONARY SVC/PX: CPT

## 2022-02-07 PROCEDURE — 63710000001 DEXAMETHASONE PER 0.25 MG: Performed by: NURSE PRACTITIONER

## 2022-02-07 PROCEDURE — 25010000002 ENOXAPARIN PER 10 MG: Performed by: NURSE PRACTITIONER

## 2022-02-07 PROCEDURE — 63710000001 INSULIN ASPART PER 5 UNITS: Performed by: NURSE PRACTITIONER

## 2022-02-07 PROCEDURE — 94760 N-INVAS EAR/PLS OXIMETRY 1: CPT

## 2022-02-07 PROCEDURE — 82962 GLUCOSE BLOOD TEST: CPT

## 2022-02-07 PROCEDURE — 86140 C-REACTIVE PROTEIN: CPT | Performed by: NURSE PRACTITIONER

## 2022-02-07 PROCEDURE — 80053 COMPREHEN METABOLIC PANEL: CPT | Performed by: NURSE PRACTITIONER

## 2022-02-07 PROCEDURE — 97530 THERAPEUTIC ACTIVITIES: CPT

## 2022-02-07 PROCEDURE — 85025 COMPLETE CBC W/AUTO DIFF WBC: CPT | Performed by: NURSE PRACTITIONER

## 2022-02-07 PROCEDURE — 63710000001 INSULIN DETEMIR PER 5 UNITS: Performed by: NURSE PRACTITIONER

## 2022-02-07 RX ADMIN — INSULIN ASPART 12 UNITS: 100 INJECTION, SOLUTION INTRAVENOUS; SUBCUTANEOUS at 17:53

## 2022-02-07 RX ADMIN — BUDESONIDE AND FORMOTEROL FUMARATE DIHYDRATE 2 PUFF: 160; 4.5 AEROSOL RESPIRATORY (INHALATION) at 19:35

## 2022-02-07 RX ADMIN — BUDESONIDE AND FORMOTEROL FUMARATE DIHYDRATE 2 PUFF: 160; 4.5 AEROSOL RESPIRATORY (INHALATION) at 07:23

## 2022-02-07 RX ADMIN — DEXAMETHASONE 6 MG: 2 TABLET ORAL at 21:09

## 2022-02-07 RX ADMIN — ALBUTEROL SULFATE 2 PUFF: 90 AEROSOL, METERED RESPIRATORY (INHALATION) at 15:00

## 2022-02-07 RX ADMIN — SODIUM CHLORIDE, PRESERVATIVE FREE 10 ML: 5 INJECTION INTRAVENOUS at 21:10

## 2022-02-07 RX ADMIN — DEXAMETHASONE 6 MG: 2 TABLET ORAL at 08:21

## 2022-02-07 RX ADMIN — ALBUTEROL SULFATE 2 PUFF: 90 AEROSOL, METERED RESPIRATORY (INHALATION) at 07:23

## 2022-02-07 RX ADMIN — ALBUTEROL SULFATE 2 PUFF: 90 AEROSOL, METERED RESPIRATORY (INHALATION) at 19:35

## 2022-02-07 RX ADMIN — INSULIN DETEMIR 20 UNITS: 100 INJECTION, SOLUTION SUBCUTANEOUS at 21:08

## 2022-02-07 RX ADMIN — ENOXAPARIN SODIUM 40 MG: 40 INJECTION SUBCUTANEOUS at 15:13

## 2022-02-07 RX ADMIN — PANTOPRAZOLE SODIUM 40 MG: 40 TABLET, DELAYED RELEASE ORAL at 05:25

## 2022-02-07 RX ADMIN — CANAGLIFLOZIN 300 MG: 300 TABLET, FILM COATED ORAL at 08:21

## 2022-02-07 RX ADMIN — IPRATROPIUM BROMIDE 2 PUFF: 17 AEROSOL, METERED RESPIRATORY (INHALATION) at 07:22

## 2022-02-07 RX ADMIN — BARICITINIB 4 MG: 2 TABLET, FILM COATED ORAL at 08:22

## 2022-02-07 RX ADMIN — LOSARTAN POTASSIUM 25 MG: 25 TABLET, FILM COATED ORAL at 08:22

## 2022-02-07 RX ADMIN — SODIUM CHLORIDE, PRESERVATIVE FREE 10 ML: 5 INJECTION INTRAVENOUS at 08:23

## 2022-02-07 RX ADMIN — ATORVASTATIN CALCIUM 40 MG: 40 TABLET, FILM COATED ORAL at 21:09

## 2022-02-07 RX ADMIN — INSULIN ASPART 8 UNITS: 100 INJECTION, SOLUTION INTRAVENOUS; SUBCUTANEOUS at 11:55

## 2022-02-07 RX ADMIN — IPRATROPIUM BROMIDE 2 PUFF: 17 AEROSOL, METERED RESPIRATORY (INHALATION) at 19:35

## 2022-02-07 RX ADMIN — INSULIN ASPART 8 UNITS: 100 INJECTION, SOLUTION INTRAVENOUS; SUBCUTANEOUS at 08:24

## 2022-02-07 RX ADMIN — ENOXAPARIN SODIUM 40 MG: 40 INJECTION SUBCUTANEOUS at 05:25

## 2022-02-07 RX ADMIN — IPRATROPIUM BROMIDE 2 PUFF: 17 AEROSOL, METERED RESPIRATORY (INHALATION) at 15:00

## 2022-02-07 NOTE — PAYOR COMM NOTE
"  Magda Farris RN [facility]  [phone]    Phone  [phone]     Fax  Cont. Stay Review      Da Falk (56 y.o. Male)             Date of Birth Social Security Number Address Home Phone MRN    1965  [address] [place] [address] [phone] [id number]    Zoroastrian Marital Status             Spiritism        Admission Date Admission Type Admitting Provider Attending Provider Department, Room/Bed    1/30/22 Emergency Dennis Benson MD [place], Emmanuel [place], MD [facility] [address] [place], 418/1    Discharge Date Discharge Disposition Discharge Destination                         Attending Provider: Dennis Benson MD    Allergies: No Known Allergies    Isolation: Enh Drop/Con   Infection: COVID (confirmed) (01/30/22)   Code Status: CPR   Advance Care Planning Activity    Ht: 182.9 cm (72\")   Wt: 106 kg (233 lb)    Admission Cmt: None   Principal Problem: None                Active Insurance as of 1/30/2022     Primary Coverage     Payor Plan Insurance Group Employer/Plan Group    GLOBAL CARE LBP GLOBAL CARE      Payor Plan Address Payor Plan Phone Number Payor Plan Fax Number Effective Dates    PO  [phone]  1/1/2021 - None Entered    [place] [address]       Subscriber Name Subscriber Birth Date Member ID       DA FALK 1965 [id number]                 Emergency Contacts      (Rel.) Home Phone Work Phone Mobile Phone    CAROL FALK (Spouse) [phone] -- --            Vital Signs (last day)     Date/Time Temp Temp src Pulse Resp BP Patient Position SpO2    02/07/22 0728 96.9 (36.1) -- 87 -- 124/72 -- --    02/07/22 0722 -- -- 90 20 -- -- 84    02/07/22 0350 -- -- 75 -- -- -- --    02/07/22 0345 -- -- -- -- -- -- 97    02/07/22 0326 96.9 (36.1) Oral 73 18 116/68 Lying 94    02/06/22 6937 -- -- 83 18 -- -- 95    02/06/22 7777 99.5 " (37.5) Oral 86 18 116/65 Lying 95    02/06/22 2043 97.3 (36.3) -- 85 18 109/71 -- 97    02/06/22 1956 -- -- 79 20 -- -- 96    02/06/22 1740 -- -- 91 -- -- -- --    02/06/22 1518 98.1 (36.7) Oral 94 20 115/77 Sitting 97    02/06/22 1408 -- -- 92 22 -- -- 95    02/06/22 1245 -- -- 99 -- -- -- --    02/06/22 1059 98 (36.7) Oral 99 18 109/62 Lying 94    02/06/22 1048 -- -- 105 -- -- -- --    02/06/22 1046 -- -- 90 22 -- -- 94    02/06/22 1011 -- -- -- 19 112/59 Lying 92    02/06/22 1008 -- -- -- -- -- -- 88    02/06/22 1006 -- -- -- -- -- -- 87    02/06/22 0950 -- -- -- -- -- -- 93    02/06/22 0949 -- -- -- -- -- -- 90    02/06/22 0947 -- -- -- -- -- -- 89    02/06/22 0945 -- -- -- -- -- -- 84    02/06/22 0846 -- -- -- -- -- -- 94    02/06/22 0637 -- -- 88 20 -- -- 95    02/06/22 0446 -- -- 88 -- -- -- --    02/06/22 0442 -- -- 80 -- -- -- --    02/06/22 0336 -- -- 81 18 -- -- 98    02/06/22 0304 97.6 (36.4) Oral 88 16 124/96 Lying 99    02/06/22 0138 -- -- 87 -- -- -- 95          Oxygen Therapy (last day)     Date/Time SpO2 Device (Oxygen Therapy) Flow (L/min) Oxygen Concentration (%) ETCO2 (mmHg)    02/07/22 0725 -- -- 55  82  --    02/07/22 0722 84 heated; high-flow nasal cannula 50 70 --    02/07/22 0345 97 heated; high-flow nasal cannula; humidified 50 69 --    02/07/22 0326 94 heated; high-flow nasal cannula 55 -- --    02/06/22 2317 95 heated; humidified; high-flow nasal cannula 55 85 --    02/06/22 2307 95 heated; high-flow nasal cannula 55 -- --    02/06/22 2043 97 heated; high-flow nasal cannula; humidified 55 85 --    02/06/22 1956 96 heated; humidified; high-flow nasal cannula 55  85  --    02/06/22 1518 97 -- -- -- --    02/06/22 1408 95 humidified; heated; high-flow nasal cannula 60 94 --    02/06/22 1059 94 -- -- -- --    02/06/22 1046 94 heated; high-flow nasal cannula; humidified 60 94 --    02/06/22 1011 92 -- 60 94 --    02/06/22 1008 88 -- 60 94 --    02/06/22 1006 87 -- 60 94 --    02/06/22 0950 93  -- 60 90 --    02/06/22 0949 90 -- 60 88 --    02/06/22 0947 89 -- 60 85 --    02/06/22 0945 84 -- 60 78 --    02/06/22 0846 94 -- 55 85 --    02/06/22 0637 95 high-flow nasal cannula; humidified; heated 55 85 --    02/06/22 0336 98 heated; humidified; high-flow nasal cannula 55 85  --    02/06/22 0304 99 humidified; high-flow nasal cannula; heated 55 93 --    02/06/22 0138 95 humidified; high-flow nasal cannula; heated 55 93 --          Current Facility-Administered Medications   Medication Dose Route Frequency Provider Last Rate Last Admin   • acetaminophen (TYLENOL) tablet 650 mg  650 mg Oral Q4H PRN Socorro Calderon APRN        Or   • acetaminophen (TYLENOL) suppository 650 mg  650 mg Rectal Q4H PRN Socorro Calderon APRN       • albuterol sulfate HFA (PROVENTIL HFA;VENTOLIN HFA;PROAIR HFA) inhaler 2 puff  2 puff Inhalation 4x Daily - RT Socorro Calderon APRN   2 puff at 02/07/22 0723   • atorvastatin (LIPITOR) tablet 40 mg  40 mg Oral Nightly Socorro Calderon APRN   40 mg at 02/06/22 2044   • baricitinib (OLUMIANT) tablet 4 mg  4 mg Oral Daily Socorro Calderon APRN   4 mg at 02/07/22 0822   • benzonatate (TESSALON) capsule 100 mg  100 mg Oral TID PRN Socorro Calderon APRN       • budesonide-formoterol (SYMBICORT) 160-4.5 MCG/ACT inhaler 2 puff  2 puff Inhalation BID - RT Socorro Calderon APRN   2 puff at 02/07/22 0723   • calcium carbonate (TUMS) chewable tablet 500 mg (200 mg elemental)  2 tablet Oral BID PRN Socorro Calderon APRN   2 tablet at 02/03/22 0840   • Canagliflozin (INVOKANA) 300 MG tablet tablet 300 mg  300 mg Oral Daily Socorro Calderon APRN   300 mg at 02/07/22 0821   • dexamethasone (DECADRON) tablet 6 mg  6 mg Oral Q12H Socorro Calderon APRN   6 mg at 02/07/22 0821    Or   • dexamethasone (DECADRON) injection 6 mg  6 mg Intravenous Q12H Socorro Calderon APRN       • dextromethorphan polistirex ER (DELSYM) 30 MG/5ML oral suspension 60 mg  60 mg Oral Q12H PRN Socorro Calderon APRN       • dextrose  (D50W) (25 g/50 mL) IV injection 25 g  25 g Intravenous Q15 Min PRN Socorro Calderon APRN       • dextrose (GLUTOSE) oral gel 15 g  15 g Oral Q15 Min PRN Socorro Calderon APRN       • enoxaparin (LOVENOX) syringe 40 mg  40 mg Subcutaneous Q12H Ander Machado APRN   40 mg at 02/07/22 0525   • glucagon (human recombinant) (GLUCAGEN DIAGNOSTIC) injection 1 mg  1 mg Subcutaneous Q15 Min PRN Socorro Calderon APRN       • insulin aspart (novoLOG) injection 0-24 Units  0-24 Units Subcutaneous TID AC Ander Machado APRN   8 Units at 02/07/22 0824   • insulin detemir (LEVEMIR) injection 20 Units  20 Units Subcutaneous Nightly Ander Machado APRN   20 Units at 02/06/22 2044   • ipratropium (ATROVENT HFA) inhaler 2 puff  2 puff Inhalation 4x Daily - RT Socorro Calderon APRN   2 puff at 02/07/22 0722   • loperamide (IMODIUM) capsule 2 mg  2 mg Oral 4x Daily PRN Socorro Calderon APRN       • losartan (COZAAR) tablet 25 mg  25 mg Oral Daily Socorro Calderon APRN   25 mg at 02/07/22 0822   • melatonin tablet 5.25 mg  5.25 mg Oral Nightly PRN Socorro Calderon APRN       • ondansetron (ZOFRAN) tablet 4 mg  4 mg Oral Q6H PRN Socorro Calderon APRN   4 mg at 02/02/22 0337    Or   • ondansetron (ZOFRAN) injection 4 mg  4 mg Intravenous Q6H PRN Socorro Calderon APRN       • pantoprazole (PROTONIX) EC tablet 40 mg  40 mg Oral Q AM Ander Machado APRN   40 mg at 02/07/22 0525   • Pharmacy Consult - Pharmacy to dose   Does not apply Continuous PRN Socorro Calderon APRN       • sodium chloride 0.9 % flush 10 mL  10 mL Intravenous PRN Johnathan Soares MD       • sodium chloride 0.9 % flush 10 mL  10 mL Intravenous Q12H Socorro Calderon APRN   10 mL at 02/07/22 0823   • sodium chloride 0.9 % flush 10 mL  10 mL Intravenous PRN Socorro Calderon APRN            Physician Progress Notes (last 24 hours)      Ander Machado APRN at 02/06/22 1230     Attestation signed by Gene Tariq MD  at 02/06/22 1414    I personally evaluated and examined the patient in conjunction with FLORENCE Menchaca and agree with the assessment, treatment plan, and disposition of the patient as recorded.  Cardiovascular: Normal S1 and S2.  Lungs: Distant breath sounds bilaterally.                        [facility] Medicine Services  INPATIENT PROGRESS NOTE    Length of Stay: 7  Date of Admission: 1/30/2022  Primary Care Physician: Bartolo Diaz APRN    Subjective   Chief Complaint: shortness of breath  HPI:  56 year old male with a history of DMII, HTN, and HLD who is admitted with acute respiratory failure due to COVID-19 pneumonia.   He had an episode of desaturation this morning and Airvo settings were increased again.      Review of Systems   Constitutional: Negative for chills and fever.   Respiratory: Positive for cough. Negative for shortness of breath.    Cardiovascular: Negative for chest pain.   Gastrointestinal: Negative for abdominal pain, nausea and vomiting.   All other systems reviewed and are negative.       All pertinent negatives and positives are as above. All other systems have been reviewed and are negative unless otherwise stated.     Objective    Temp:  [96.6 °F (35.9 °C)-98 °F (36.7 °C)] 98 °F (36.7 °C)  Heart Rate:  [] 99  Resp:  [16-22] 18  BP: (109-124)/(59-96) 109/62    Physical Exam  Vitals reviewed.   Constitutional:       Appearance: Normal appearance. He is not ill-appearing.   HENT:      Head: Normocephalic and atraumatic.   Cardiovascular:      Rate and Rhythm: Normal rate and regular rhythm.   Pulmonary:      Effort: Pulmonary effort is normal.      Breath sounds: Normal breath sounds.   Abdominal:      General: There is no distension.      Palpations: Abdomen is soft.      Tenderness: There is no abdominal tenderness.   Musculoskeletal:         General: No swelling or deformity.   Skin:     General: Skin is warm and dry.    Neurological:      General: No focal deficit present.      Mental Status: He is alert and oriented to person, place, and time.       Results Review:  I have reviewed the labs, radiology results, and diagnostic studies.    Laboratory Data:   Results from last 7 days   Lab Units 02/06/22  0636 02/05/22  0642 02/04/22  0655   SODIUM mmol/L 131* 137 140   POTASSIUM mmol/L 4.5 5.1 5.2   CHLORIDE mmol/L 95* 100 102   CO2 mmol/L 24.0 20.0* 21.0*   BUN mg/dL 33* 35* 38*   CREATININE mg/dL 0.78 0.78 0.86   GLUCOSE mg/dL 188* 284* 234*   CALCIUM mg/dL 9.1 9.5 9.0   BILIRUBIN mg/dL 0.7 0.7 0.6   ALK PHOS U/L 143* 126* 126*   ALT (SGPT) U/L 57* 62* 54*   AST (SGOT) U/L 62* 63* 74*   ANION GAP mmol/L 12.0 17.0* 17.0*     Estimated Creatinine Clearance: 135.1 mL/min (by C-G formula based on SCr of 0.78 mg/dL).          Results from last 7 days   Lab Units 02/06/22  0636 02/05/22  0642 02/04/22  0655 02/03/22  0624 02/02/22  0547   WBC 10*3/mm3 7.62 8.62 5.53 4.83 5.36   HEMOGLOBIN g/dL 15.7 15.7 15.5 15.1 15.0   HEMATOCRIT % 45.7 45.9 45.5 44.0 44.4   PLATELETS 10*3/mm3 164 180 165 139* 147           Culture Data:   No results found for: BLOODCX  No results found for: URINECX  No results found for: RESPCX  No results found for: WOUNDCX  No results found for: STOOLCX  No components found for: BODYFLD    Radiology Data:   Imaging Results (Last 24 Hours)     Procedure Component Value Units Date/Time    XR Chest 1 View [[id number]] Collected: 02/06/22 1025     Updated: 02/06/22 1209    Narrative:      EXAM: 1-VIEW CHEST RADIOGRAPH(S)    INDICATION:  hypoxia, J18.9 Pneumonia, unspecified organism  J96.01 Acute respiratory failure with hypoxia U07.1 COVID-19  J12.82 Pneumonia due to coronavirus disease 2019 Z74.09 Other  reduced mobility    COMPARISON:  Chest radiograph(s) dated 1/30/2022    TECHNIQUE: AP portable radiograph was obtained    FINDINGS:    No medical support devices are identified.  Multifocal patchy  consolidative  opacities are decreased in density from the  comparison exam.  No pleural effusion or pneumothorax.  Mediastinal contours are normal. Heart size is within normal  limits for technique.       Impression:        Significant interval decrease in density of multifocal patchy  consolidative opacities from comparison exam.  Findings likely  represent improving pneumonia.    Electronically signed by:  Jose J Moseley MD  2/6/2022 12:07 PM  CST Workstation: [id number]          I have reviewed the patient's current medications.     Assessment/Plan     Active Hospital Problems    Diagnosis    • Acute respiratory failure with hypoxia (HCC)    • Pneumonia due to COVID-19 virus    • COVID-19 virus detected    HTN  DMII    Plan:    Supplemental oxygen, currently Airvo 60 LPM at 94% FiO2, wean as tolerated  Remdesivir not indicated due to duration of illness  Decadron day 8  Baricitinib day 8  Trend inflammatory markers, improved  Albuterol MDI, Atrovent MDI, Symbicort  Incentive spirometry, OPEP  BP control: Losartan  Glucose control: Invokana, Levemir, SSI 0-24   PT/OT  VTE PPx: Lovenox BID      I confirmed that the patient's Advance Care Plan is present, code status is documented, or surrogate decision maker is listed in the patient's medical record.     The patient was evaluated during the global COVID-19 pandemic, and the diagnosis was suspected/considered upon their initial presentation.  Evaluation, treatment, and testing were consistent with current guidelines for patients who present with complaints or symptoms that may be related to COVID-19.    Approximately 32 minutes of critical care time were spent managing the patient exclusive of billable procedures.           This document has been electronically signed by FLORENCE Lemos on February 6, 2022 12:30 CST       Electronically signed by Gene Tariq MD at 02/06/22 1414       Medical Student Notes (last 24 hours)  Notes from 02/06/22 0917 through  02/07/22 0917   No notes of this type exist for this encounter.         Consult Notes (last 24 hours)  Notes from 02/06/22 0917 through 02/07/22 0917   No notes of this type exist for this encounter.

## 2022-02-07 NOTE — PLAN OF CARE
Goal Outcome Evaluation:  Plan of Care Reviewed With: patient        Progress: improving  Outcome Summary: pt reports feeling better and is seated upright in bed.o2 at 91-92 on airvo. sits eob and is animated and talkative with sats dropping to mid  80s and then will recover to high 80s and to 90. stands ind and amb to doorway and back and sats drop to 82 but does recover. functional act drops o2 but does recover. pt states feeling better overall . does not care for isolation aspect of recovery.

## 2022-02-07 NOTE — PROGRESS NOTES
[facility] Medicine Services  INPATIENT PROGRESS NOTE    Length of Stay: 8  Date of Admission: 1/30/2022  Primary Care Physician: Bartolo Diaz APRN    Subjective   Chief Complaint: shortness of breath  HPI:  56 year old male with a history of DMII, HTN, and HLD who is admitted with acute respiratory failure due to COVID-19 pneumonia.   Chest x-ray demonstrates improvement. He remains difficult to wean from Airvo.     Review of Systems   Constitutional: Negative for chills and fever.   Respiratory: Negative for shortness of breath.    Cardiovascular: Negative for chest pain.   Gastrointestinal: Negative for abdominal pain, nausea and vomiting.   All other systems reviewed and are negative.       All pertinent negatives and positives are as above. All other systems have been reviewed and are negative unless otherwise stated.     Objective    Temp:  [96.9 °F (36.1 °C)-99.5 °F (37.5 °C)] 97 °F (36.1 °C)  Heart Rate:  [] 98  Resp:  [18-22] 22  BP: (109-124)/(59-77) 122/59    Physical Exam  Vitals reviewed.   Constitutional:       Appearance: Normal appearance. He is not ill-appearing.   HENT:      Head: Normocephalic and atraumatic.   Cardiovascular:      Rate and Rhythm: Normal rate and regular rhythm.   Pulmonary:      Effort: Pulmonary effort is normal.      Breath sounds: Normal breath sounds.   Abdominal:      General: There is no distension.      Palpations: Abdomen is soft.      Tenderness: There is no abdominal tenderness.   Musculoskeletal:         General: No swelling or deformity.   Skin:     General: Skin is warm and dry.   Neurological:      General: No focal deficit present.      Mental Status: He is alert and oriented to person, place, and time.       Results Review:  I have reviewed the labs, radiology results, and diagnostic studies.    Laboratory Data:   Results from last 7 days   Lab Units 02/07/22  0545 02/06/22  0636 02/05/22  0642    SODIUM mmol/L 132* 131* 137   POTASSIUM mmol/L 4.4 4.5 5.1   CHLORIDE mmol/L 96* 95* 100   CO2 mmol/L 23.0 24.0 20.0*   BUN mg/dL 32* 33* 35*   CREATININE mg/dL 0.69* 0.78 0.78   GLUCOSE mg/dL 224* 188* 284*   CALCIUM mg/dL 9.0 9.1 9.5   BILIRUBIN mg/dL 0.6 0.7 0.7   ALK PHOS U/L 144* 143* 126*   ALT (SGPT) U/L 51* 57* 62*   AST (SGOT) U/L 66* 62* 63*   ANION GAP mmol/L 13.0 12.0 17.0*     Estimated Creatinine Clearance: 154.1 mL/min (A) (by C-G formula based on SCr of 0.69 mg/dL (L)).          Results from last 7 days   Lab Units 02/07/22  0545 02/06/22  0636 02/05/22  0642 02/04/22  0655 02/03/22  0624   WBC 10*3/mm3 7.86 7.62 8.62 5.53 4.83   HEMOGLOBIN g/dL 15.6 15.7 15.7 15.5 15.1   HEMATOCRIT % 44.5 45.7 45.9 45.5 44.0   PLATELETS 10*3/mm3 157 164 180 165 139*           Culture Data:   No results found for: BLOODCX  No results found for: URINECX  No results found for: RESPCX  No results found for: WOUNDCX  No results found for: STOOLCX  No components found for: BODYFLD    Radiology Data:   Imaging Results (Last 24 Hours)     ** No results found for the last 24 hours. **          I have reviewed the patient's current medications.     Assessment/Plan     Active Hospital Problems    Diagnosis    • Acute respiratory failure with hypoxia (HCC)    • Pneumonia due to COVID-19 virus    • COVID-19 virus detected    HTN  DMII    Plan:    Supplemental oxygen, currently Airvo 60 LPM at 92% FiO2, wean as tolerated  Remdesivir not indicated due to duration of illness  Decadron day 9  Baricitinib day 9/14  Trend inflammatory markers  Albuterol MDI, Atrovent MDI, Symbicort  Incentive spirometry, OPEP  BP control: Losartan  Glucose control: Invokana, Levemir, SSI 0-24   PT/OT  VTE PPx: Lovenox BID      I confirmed that the patient's Advance Care Plan is present, code status is documented, or surrogate decision maker is listed in the patient's medical record.     The patient was evaluated during the global COVID-19 pandemic,  and the diagnosis was suspected/considered upon their initial presentation.  Evaluation, treatment, and testing were consistent with current guidelines for patients who present with complaints or symptoms that may be related to COVID-19.            This document has been electronically signed by FLORENCE Lemos on February 7, 2022 12:27 CST

## 2022-02-07 NOTE — THERAPY TREATMENT NOTE
Acute Care - Physical Therapy Treatment Note  [facility]     Patient Name: Louis Brooks  : 1965  MRN: [id number]  Today's Date: 2022      Visit Dx:     ICD-10-CM ICD-9-CM   1. Pneumonia of both lungs due to infectious organism, unspecified part of lung  J18.9 483.8   2. Acute respiratory failure with hypoxia ([facility])  J96.01 518.81   3. Pneumonia due to COVID-19 virus  U07.1 480.8    J12.82 079.89   4. Impaired functional mobility, balance, gait, and endurance  Z74.09 V49.89     Patient Active Problem List   Diagnosis   • Type 2 diabetes mellitus without complication, without long-term current use of insulin ([facility])   • Elevated BP without diagnosis of hypertension   • Mixed hyperlipidemia   • Class 2 severe obesity due to excess calories with serious comorbidity and body mass index (BMI) of 36.0 to 36.9 in adult ([facility])   • Gastroesophageal reflux disease without esophagitis   • Essential hypertension   • Acute respiratory failure with hypoxia ([facility])   • Pneumonia due to COVID-19 virus   • COVID-19 virus detected     Past Medical History:   Diagnosis Date   • Arthritis    • Class 2 severe obesity due to excess calories with serious comorbidity and body mass index (BMI) of 36.0 to 36.9 in adult ([facility]) 2021   • GERD (gastroesophageal reflux disease)    • Hypertension    • Mixed hyperlipidemia 2021   • Pneumonia due to COVID-19 virus 2022   • Type 2 diabetes mellitus without complication, without long-term current use of insulin ([facility]) 2021     History reviewed. No pertinent surgical history.  PT Assessment (last 12 hours)     PT Evaluation and Treatment     Row Name 22 0928          Physical Therapy Time and Intention    Subjective Information no complaints  -RW     Document Type therapy note (daily note)  -RW     Mode of Treatment individual therapy; physical therapy  -RW     Patient Effort good  -RW     Row Name 22          General Information    Patient Profile  Reviewed yes  -RW     Existing Precautions/Restrictions oxygen therapy device and L/min  -RW     Row Name 02/07/22 0928          Cognition    Orientation Status (Cognition) oriented x 4  -     Row Name 02/07/22 0928          Pain Scale: Numbers Pre/Post-Treatment    Pretreatment Pain Rating 0/10 - no pain  -RW     Posttreatment Pain Rating 0/10 - no pain  -RW     Row Name 02/07/22 0928          Bed Mobility    Bed Mobility supine-sit; sit-supine  -RW     Supine-Sit [facility] (Bed Mobility) independent  -RW     Sit-Supine [facility] (Bed Mobility) independent  -RW     Row Name 02/07/22 0928          Transfers    Sit-Stand [facility] (Transfers) independent  -RW     Row Name 02/07/22 0928          Gait/Stairs (Locomotion)    [facility] Level (Gait) independent  -     Row Name 02/07/22 0928          Safety Issues, Functional Mobility    Impairments Affecting Function (Mobility) shortness of breath; endurance/activity tolerance  -     Row Name 02/07/22 0928          Vital Signs    Pretreatment Heart Rate (beats/min) 88  -RW     Intratreatment Heart Rate (beats/min) 96  -RW     Posttreatment Heart Rate (beats/min) 91  -RW     Pre SpO2 (%) 94  -RW     O2 Delivery Pre Treatment hi-flow  -RW     Intra SpO2 (%) 84  -RW     O2 Delivery Intra Treatment hi-flow  -RW     Post SpO2 (%) 89  -RW     Row Name 02/07/22 0928          Bed Mobility Goal 1 (PT)    Activity/Assistive Device (Bed Mobility Goal 1, PT) sit to supine/supine to sit  -RW     [facility] Level/Cues Needed (Bed Mobility Goal 1, PT) independent  -RW     Time Frame (Bed Mobility Goal 1, PT) by discharge  -RW     Strategies/Barriers (Bed Mobility Goal 1, PT) HOB flat, no bed rails.  -RW     Progress/Outcomes (Bed Mobility Goal 1, PT) goal met  -     Row Name 02/07/22 0928          Gait Training Goal 1 (PT)    Activity/Assistive Device (Gait Training Goal 1, PT) gait (walking locomotion)  -RW     [facility] Level (Gait Training Goal 1, PT)  independent  -RW     Distance (Gait Training Goal 1, PT) 35'x3.  -RW     Time Frame (Gait Training Goal 1, PT) by discharge  -RW     Strategies/Barriers (Gait Training Goal 1, PT) Maintain SpO2 >90%.  -RW     Progress/Outcome (Gait Training Goal 1, PT) goal not met  -RW     Row Name 02/07/22 0928          Problem Specific Goal 1 (PT)    Problem Specific Goal 1 (PT) Score 28/28 on Tinetti fall risk assessment.  -RW     Time Frame (Problem Specific Goal 1, PT) by discharge  -RW     Strategies/Barriers (Problem Specific Goal 1, PT) Maintain SpO2 >90%.  -RW     Progress/Outcome (Problem Specific Goal 1, PT) goal not met  -RW     Row Name 02/07/22 0928          Positioning and Restraints    Pre-Treatment Position in bed  -RW     Post Treatment Position bed  -RW     In Bed sitting EOB  -RW           User Key  (r) = Recorded By, (t) = Taken By, (c) = Cosigned By    Initials Name Provider Type    RW Yanick Moseley, PTA Physical Therapy Assistant                Physical Therapy Education                 Title: PT OT SLP Therapies (In Progress)     Topic: Physical Therapy (In Progress)     Point: Mobility training (Done)     Learning Progress Summary           Patient Acceptance, E, VU by ARMIN at 2/1/2022 9260    Comment: PT POC, goals, breathing technique.                   Point: Home exercise program (Not Started)     Learner Progress:  Not documented in this visit.          Point: Body mechanics (Not Started)     Learner Progress:  Not documented in this visit.          Point: Precautions (Not Started)     Learner Progress:  Not documented in this visit.                      User Key     Initials Effective Dates Name Provider Type Discipline    ARMIN 06/16/21 -  Teto Storm, PT Physical Therapist PT              PT Recommendation and Plan  Anticipated Discharge Disposition (PT): home, home with outpatient therapy services, long-term acute care facility  Plan of Care Reviewed With: patient  Progress: improving  Outcome  Summary: pt reports feeling better and is seated upright in bed.o2 at 91-92 on airvo. sits eob and is animated and talkative with sats dropping to mid  80s and then will recover to high 80s and to 90. stands ind and amb to doorway and back and sats drop to 82 but does recover. functional act drops o2 but does recover. pt states feeling better overall . does not care for isolation aspect of recovery.       Time Calculation:    PT Charges     Row Name 02/07/22 1022             Time Calculation    Start Time 0928  -RW      Stop Time 0958  -RW      Time Calculation (min) 30 min  -RW              Time Calculation- PT    Total Timed Code Minutes- PT 30 minute(s)  -RW              Timed Charges    24503 - PT Therapeutic Activity Minutes 30  -RW              Total Minutes    Timed Charges Total Minutes 30  -RW       Total Minutes 30  -RW            User Key  (r) = Recorded By, (t) = Taken By, (c) = Cosigned By    Initials Name Provider Type    RW Yanick Moseley PTA Physical Therapy Assistant              Therapy Charges for Today     Code Description Service Date Service Provider Modifiers Qty    [id number]  PT THERAPEUTIC ACT EA 15 MIN 2/7/2022 Yanick Moseley PTA GP 2          PT G-Codes  Outcome Measure Options: AM-PAC 6 Clicks Basic Mobility (PT), Karly  AM-PAC 6 Clicks Score (PT): 23    Yanick Moseley PTA  2/7/2022

## 2022-02-07 NOTE — PLAN OF CARE
Goal Outcome Evaluation:  Plan of Care Reviewed With: patient            Pt remains on 02 at 60L via airvo, unable to wean at time. Denies any SOA this shift, will cont to monitor.

## 2022-02-07 NOTE — PLAN OF CARE
Goal Outcome Evaluation:        Patient is resting, he is still very frustrated doesn't have adequate understanding of disease and recovery is also unwilling to be educated.

## 2022-02-08 LAB
ALBUMIN SERPL-MCNC: 3.6 G/DL (ref 3.5–5.2)
ALBUMIN/GLOB SERPL: 1.1 G/DL
ALP SERPL-CCNC: 150 U/L (ref 39–117)
ALT SERPL W P-5'-P-CCNC: 50 U/L (ref 1–41)
ANION GAP SERPL CALCULATED.3IONS-SCNC: 13 MMOL/L (ref 5–15)
AST SERPL-CCNC: 65 U/L (ref 1–40)
BASOPHILS # BLD AUTO: 0.02 10*3/MM3 (ref 0–0.2)
BASOPHILS NFR BLD AUTO: 0.2 % (ref 0–1.5)
BILIRUB SERPL-MCNC: 0.6 MG/DL (ref 0–1.2)
BUN SERPL-MCNC: 29 MG/DL (ref 6–20)
BUN/CREAT SERPL: 38.7 (ref 7–25)
CALCIUM SPEC-SCNC: 8.9 MG/DL (ref 8.6–10.5)
CHLORIDE SERPL-SCNC: 97 MMOL/L (ref 98–107)
CO2 SERPL-SCNC: 25 MMOL/L (ref 22–29)
CREAT SERPL-MCNC: 0.75 MG/DL (ref 0.76–1.27)
CRP SERPL-MCNC: 2.85 MG/DL (ref 0–0.5)
DEPRECATED RDW RBC AUTO: 40.2 FL (ref 37–54)
EOSINOPHIL # BLD AUTO: 0.07 10*3/MM3 (ref 0–0.4)
EOSINOPHIL NFR BLD AUTO: 0.7 % (ref 0.3–6.2)
ERYTHROCYTE [DISTWIDTH] IN BLOOD BY AUTOMATED COUNT: 12.9 % (ref 12.3–15.4)
GFR SERPL CREATININE-BSD FRML MDRD: 109 ML/MIN/1.73
GLOBULIN UR ELPH-MCNC: 3.3 GM/DL
GLUCOSE BLDC GLUCOMTR-MCNC: 172 MG/DL (ref 70–130)
GLUCOSE BLDC GLUCOMTR-MCNC: 241 MG/DL (ref 70–130)
GLUCOSE BLDC GLUCOMTR-MCNC: 253 MG/DL (ref 70–130)
GLUCOSE BLDC GLUCOMTR-MCNC: 275 MG/DL (ref 70–130)
GLUCOSE SERPL-MCNC: 244 MG/DL (ref 65–99)
HCT VFR BLD AUTO: 42.8 % (ref 37.5–51)
HGB BLD-MCNC: 14.9 G/DL (ref 13–17.7)
IMM GRANULOCYTES # BLD AUTO: 0.05 10*3/MM3 (ref 0–0.05)
IMM GRANULOCYTES NFR BLD AUTO: 0.5 % (ref 0–0.5)
LYMPHOCYTES # BLD AUTO: 0.38 10*3/MM3 (ref 0.7–3.1)
LYMPHOCYTES NFR BLD AUTO: 3.9 % (ref 19.6–45.3)
MCH RBC QN AUTO: 30.2 PG (ref 26.6–33)
MCHC RBC AUTO-ENTMCNC: 34.8 G/DL (ref 31.5–35.7)
MCV RBC AUTO: 86.6 FL (ref 79–97)
MONOCYTES # BLD AUTO: 0.3 10*3/MM3 (ref 0.1–0.9)
MONOCYTES NFR BLD AUTO: 3.1 % (ref 5–12)
NEUTROPHILS NFR BLD AUTO: 8.81 10*3/MM3 (ref 1.7–7)
NEUTROPHILS NFR BLD AUTO: 91.6 % (ref 42.7–76)
NRBC BLD AUTO-RTO: 0 /100 WBC (ref 0–0.2)
PLATELET # BLD AUTO: 181 10*3/MM3 (ref 140–450)
PMV BLD AUTO: 11.1 FL (ref 6–12)
POTASSIUM SERPL-SCNC: 4.5 MMOL/L (ref 3.5–5.2)
PROT SERPL-MCNC: 6.9 G/DL (ref 6–8.5)
RBC # BLD AUTO: 4.94 10*6/MM3 (ref 4.14–5.8)
SODIUM SERPL-SCNC: 135 MMOL/L (ref 136–145)
WBC NRBC COR # BLD: 9.63 10*3/MM3 (ref 3.4–10.8)

## 2022-02-08 PROCEDURE — 25010000002 ENOXAPARIN PER 10 MG: Performed by: NURSE PRACTITIONER

## 2022-02-08 PROCEDURE — 63710000001 DEXAMETHASONE PER 0.25 MG: Performed by: NURSE PRACTITIONER

## 2022-02-08 PROCEDURE — 86140 C-REACTIVE PROTEIN: CPT | Performed by: NURSE PRACTITIONER

## 2022-02-08 PROCEDURE — 94799 UNLISTED PULMONARY SVC/PX: CPT

## 2022-02-08 PROCEDURE — 82962 GLUCOSE BLOOD TEST: CPT

## 2022-02-08 PROCEDURE — 85025 COMPLETE CBC W/AUTO DIFF WBC: CPT | Performed by: NURSE PRACTITIONER

## 2022-02-08 PROCEDURE — 94760 N-INVAS EAR/PLS OXIMETRY 1: CPT

## 2022-02-08 PROCEDURE — 63710000001 INSULIN DETEMIR PER 5 UNITS: Performed by: NURSE PRACTITIONER

## 2022-02-08 PROCEDURE — 80053 COMPREHEN METABOLIC PANEL: CPT | Performed by: NURSE PRACTITIONER

## 2022-02-08 PROCEDURE — 94664 DEMO&/EVAL PT USE INHALER: CPT

## 2022-02-08 PROCEDURE — 63710000001 INSULIN ASPART PER 5 UNITS: Performed by: NURSE PRACTITIONER

## 2022-02-08 PROCEDURE — 25010000002 DEXAMETHASONE PER 1 MG: Performed by: NURSE PRACTITIONER

## 2022-02-08 RX ADMIN — SODIUM CHLORIDE, PRESERVATIVE FREE 10 ML: 5 INJECTION INTRAVENOUS at 08:20

## 2022-02-08 RX ADMIN — INSULIN ASPART 4 UNITS: 100 INJECTION, SOLUTION INTRAVENOUS; SUBCUTANEOUS at 08:19

## 2022-02-08 RX ADMIN — DEXAMETHASONE SODIUM PHOSPHATE 6 MG: 4 INJECTION INTRA-ARTICULAR; INTRALESIONAL; INTRAMUSCULAR; INTRAVENOUS; SOFT TISSUE at 08:19

## 2022-02-08 RX ADMIN — CANAGLIFLOZIN 300 MG: 300 TABLET, FILM COATED ORAL at 08:19

## 2022-02-08 RX ADMIN — ALBUTEROL SULFATE 2 PUFF: 90 AEROSOL, METERED RESPIRATORY (INHALATION) at 10:41

## 2022-02-08 RX ADMIN — ENOXAPARIN SODIUM 40 MG: 40 INJECTION SUBCUTANEOUS at 03:21

## 2022-02-08 RX ADMIN — DEXAMETHASONE 6 MG: 2 TABLET ORAL at 20:32

## 2022-02-08 RX ADMIN — LOSARTAN POTASSIUM 25 MG: 25 TABLET, FILM COATED ORAL at 08:19

## 2022-02-08 RX ADMIN — IPRATROPIUM BROMIDE 2 PUFF: 17 AEROSOL, METERED RESPIRATORY (INHALATION) at 06:50

## 2022-02-08 RX ADMIN — ATORVASTATIN CALCIUM 40 MG: 40 TABLET, FILM COATED ORAL at 20:32

## 2022-02-08 RX ADMIN — IPRATROPIUM BROMIDE 2 PUFF: 17 AEROSOL, METERED RESPIRATORY (INHALATION) at 10:41

## 2022-02-08 RX ADMIN — BUDESONIDE AND FORMOTEROL FUMARATE DIHYDRATE 2 PUFF: 160; 4.5 AEROSOL RESPIRATORY (INHALATION) at 06:50

## 2022-02-08 RX ADMIN — BARICITINIB 4 MG: 2 TABLET, FILM COATED ORAL at 08:30

## 2022-02-08 RX ADMIN — PANTOPRAZOLE SODIUM 40 MG: 40 TABLET, DELAYED RELEASE ORAL at 05:22

## 2022-02-08 RX ADMIN — INSULIN DETEMIR 22 UNITS: 100 INJECTION, SOLUTION SUBCUTANEOUS at 20:33

## 2022-02-08 RX ADMIN — ALBUTEROL SULFATE 2 PUFF: 90 AEROSOL, METERED RESPIRATORY (INHALATION) at 06:50

## 2022-02-08 RX ADMIN — SODIUM CHLORIDE, PRESERVATIVE FREE 10 ML: 5 INJECTION INTRAVENOUS at 20:33

## 2022-02-08 RX ADMIN — IPRATROPIUM BROMIDE 2 PUFF: 17 AEROSOL, METERED RESPIRATORY (INHALATION) at 15:06

## 2022-02-08 RX ADMIN — ALBUTEROL SULFATE 2 PUFF: 90 AEROSOL, METERED RESPIRATORY (INHALATION) at 19:19

## 2022-02-08 RX ADMIN — IPRATROPIUM BROMIDE 2 PUFF: 17 AEROSOL, METERED RESPIRATORY (INHALATION) at 19:19

## 2022-02-08 RX ADMIN — INSULIN ASPART 12 UNITS: 100 INJECTION, SOLUTION INTRAVENOUS; SUBCUTANEOUS at 17:53

## 2022-02-08 RX ADMIN — ALBUTEROL SULFATE 2 PUFF: 90 AEROSOL, METERED RESPIRATORY (INHALATION) at 15:06

## 2022-02-08 RX ADMIN — ENOXAPARIN SODIUM 40 MG: 40 INJECTION SUBCUTANEOUS at 17:54

## 2022-02-08 RX ADMIN — INSULIN ASPART 8 UNITS: 100 INJECTION, SOLUTION INTRAVENOUS; SUBCUTANEOUS at 11:52

## 2022-02-08 RX ADMIN — BUDESONIDE AND FORMOTEROL FUMARATE DIHYDRATE 2 PUFF: 160; 4.5 AEROSOL RESPIRATORY (INHALATION) at 19:19

## 2022-02-08 NOTE — PLAN OF CARE
Goal Outcome Evaluation:           Progress: no change  Outcome Summary: Patient states that he is feeling much better but oxygen sats continue to drop whenever patient sleeps or exerts himself. Patient on 60L AirVo at this time.

## 2022-02-08 NOTE — PROGRESS NOTES
[facility] Medicine Services  INPATIENT PROGRESS NOTE    Length of Stay: 9  Date of Admission: 1/30/2022  Primary Care Physician: Bartolo Diaz APRN    Subjective   Chief Complaint: shortness of breath  HPI:  56 year old male with a history of DMII, HTN, and HLD who is admitted with acute respiratory failure due to COVID-19 pneumonia.   Chest x-ray demonstrates improvement. He remains difficult to wean from Airvo. He is frustrated with the duration of his care.     Review of Systems   Constitutional: Negative for chills and fever.   Respiratory: Negative for shortness of breath.    Cardiovascular: Negative for chest pain.   Gastrointestinal: Negative for abdominal pain, nausea and vomiting.   All other systems reviewed and are negative.       All pertinent negatives and positives are as above. All other systems have been reviewed and are negative unless otherwise stated.     Objective    Temp:  [96.6 °F (35.9 °C)-98.8 °F (37.1 °C)] 96.6 °F (35.9 °C)  Heart Rate:  [] 86  Resp:  [18-22] 20  BP: (105-130)/(68-71) 118/68    Physical Exam  Vitals reviewed.   Constitutional:       Appearance: Normal appearance. He is not ill-appearing.   HENT:      Head: Normocephalic and atraumatic.   Cardiovascular:      Rate and Rhythm: Normal rate and regular rhythm.   Pulmonary:      Effort: Pulmonary effort is normal.      Breath sounds: Normal breath sounds.   Abdominal:      General: There is no distension.      Palpations: Abdomen is soft.      Tenderness: There is no abdominal tenderness.   Musculoskeletal:         General: No swelling or deformity.   Skin:     General: Skin is warm and dry.   Neurological:      General: No focal deficit present.      Mental Status: He is alert and oriented to person, place, and time.       Results Review:  I have reviewed the labs, radiology results, and diagnostic studies.    Laboratory Data:   Results from last 7 days   Lab  Units 02/08/22  0540 02/07/22  0545 02/06/22  0636   SODIUM mmol/L 135* 132* 131*   POTASSIUM mmol/L 4.5 4.4 4.5   CHLORIDE mmol/L 97* 96* 95*   CO2 mmol/L 25.0 23.0 24.0   BUN mg/dL 29* 32* 33*   CREATININE mg/dL 0.75* 0.69* 0.78   GLUCOSE mg/dL 244* 224* 188*   CALCIUM mg/dL 8.9 9.0 9.1   BILIRUBIN mg/dL 0.6 0.6 0.7   ALK PHOS U/L 150* 144* 143*   ALT (SGPT) U/L 50* 51* 57*   AST (SGOT) U/L 65* 66* 62*   ANION GAP mmol/L 13.0 13.0 12.0     Estimated Creatinine Clearance: 141.7 mL/min (A) (by C-G formula based on SCr of 0.75 mg/dL (L)).          Results from last 7 days   Lab Units 02/08/22 0540 02/07/22  0545 02/06/22  0636 02/05/22  0642 02/04/22  0655   WBC 10*3/mm3 9.63 7.86 7.62 8.62 5.53   HEMOGLOBIN g/dL 14.9 15.6 15.7 15.7 15.5   HEMATOCRIT % 42.8 44.5 45.7 45.9 45.5   PLATELETS 10*3/mm3 181 157 164 180 165           Culture Data:   No results found for: BLOODCX  No results found for: URINECX  No results found for: RESPCX  No results found for: WOUNDCX  No results found for: STOOLCX  No components found for: BODYFLD    Radiology Data:   Imaging Results (Last 24 Hours)     ** No results found for the last 24 hours. **          I have reviewed the patient's current medications.     Assessment/Plan     Active Hospital Problems    Diagnosis    • Acute respiratory failure with hypoxia (HCC)    • Pneumonia due to COVID-19 virus    • COVID-19 virus detected    HTN  DMII    Plan:    Supplemental oxygen, currently Airvo 50 LPM at 85% FiO2, wean as tolerated  Remdesivir not indicated due to duration of illness  Decadron day 10  Baricitinib day 10/14  Trend inflammatory markers  Albuterol MDI, Atrovent MDI, Symbicort  Incentive spirometry, OPEP  BP control: Losartan  Glucose control: Invokana, Levemir, SSI 0-24   PT/OT  VTE PPx: Lovenox BID      I confirmed that the patient's Advance Care Plan is present, code status is documented, or surrogate decision maker is listed in the patient's medical record.     The patient  was evaluated during the global COVID-19 pandemic, and the diagnosis was suspected/considered upon their initial presentation.  Evaluation, treatment, and testing were consistent with current guidelines for patients who present with complaints or symptoms that may be related to COVID-19.              This document has been electronically signed by FLORENCE Lemos on February 8, 2022 11:54 CST

## 2022-02-09 LAB
ALBUMIN SERPL-MCNC: 3.4 G/DL (ref 3.5–5.2)
ALBUMIN/GLOB SERPL: 1.1 G/DL
ALP SERPL-CCNC: 157 U/L (ref 39–117)
ALT SERPL W P-5'-P-CCNC: 45 U/L (ref 1–41)
ANION GAP SERPL CALCULATED.3IONS-SCNC: 8 MMOL/L (ref 5–15)
AST SERPL-CCNC: 60 U/L (ref 1–40)
BASOPHILS # BLD AUTO: 0.01 10*3/MM3 (ref 0–0.2)
BASOPHILS NFR BLD AUTO: 0.1 % (ref 0–1.5)
BILIRUB SERPL-MCNC: 0.6 MG/DL (ref 0–1.2)
BUN SERPL-MCNC: 28 MG/DL (ref 6–20)
BUN/CREAT SERPL: 41.8 (ref 7–25)
CALCIUM SPEC-SCNC: 8.7 MG/DL (ref 8.6–10.5)
CHLORIDE SERPL-SCNC: 99 MMOL/L (ref 98–107)
CO2 SERPL-SCNC: 26 MMOL/L (ref 22–29)
CREAT SERPL-MCNC: 0.67 MG/DL (ref 0.76–1.27)
CRP SERPL-MCNC: 2.81 MG/DL (ref 0–0.5)
DEPRECATED RDW RBC AUTO: 39.9 FL (ref 37–54)
EOSINOPHIL # BLD AUTO: 0.1 10*3/MM3 (ref 0–0.4)
EOSINOPHIL NFR BLD AUTO: 1 % (ref 0.3–6.2)
ERYTHROCYTE [DISTWIDTH] IN BLOOD BY AUTOMATED COUNT: 12.8 % (ref 12.3–15.4)
GFR SERPL CREATININE-BSD FRML MDRD: 124 ML/MIN/1.73
GLOBULIN UR ELPH-MCNC: 3.1 GM/DL
GLUCOSE BLDC GLUCOMTR-MCNC: 144 MG/DL (ref 70–130)
GLUCOSE BLDC GLUCOMTR-MCNC: 262 MG/DL (ref 70–130)
GLUCOSE BLDC GLUCOMTR-MCNC: 288 MG/DL (ref 70–130)
GLUCOSE SERPL-MCNC: 211 MG/DL (ref 65–99)
HCT VFR BLD AUTO: 41.6 % (ref 37.5–51)
HGB BLD-MCNC: 14.7 G/DL (ref 13–17.7)
IMM GRANULOCYTES # BLD AUTO: 0.09 10*3/MM3 (ref 0–0.05)
IMM GRANULOCYTES NFR BLD AUTO: 0.9 % (ref 0–0.5)
LYMPHOCYTES # BLD AUTO: 0.28 10*3/MM3 (ref 0.7–3.1)
LYMPHOCYTES NFR BLD AUTO: 2.9 % (ref 19.6–45.3)
MCH RBC QN AUTO: 30.4 PG (ref 26.6–33)
MCHC RBC AUTO-ENTMCNC: 35.3 G/DL (ref 31.5–35.7)
MCV RBC AUTO: 86.1 FL (ref 79–97)
MONOCYTES # BLD AUTO: 0.24 10*3/MM3 (ref 0.1–0.9)
MONOCYTES NFR BLD AUTO: 2.4 % (ref 5–12)
NEUTROPHILS NFR BLD AUTO: 9.1 10*3/MM3 (ref 1.7–7)
NEUTROPHILS NFR BLD AUTO: 92.7 % (ref 42.7–76)
NRBC BLD AUTO-RTO: 0 /100 WBC (ref 0–0.2)
PLATELET # BLD AUTO: 194 10*3/MM3 (ref 140–450)
PMV BLD AUTO: 10.6 FL (ref 6–12)
POTASSIUM SERPL-SCNC: 4.3 MMOL/L (ref 3.5–5.2)
PROT SERPL-MCNC: 6.5 G/DL (ref 6–8.5)
RBC # BLD AUTO: 4.83 10*6/MM3 (ref 4.14–5.8)
SODIUM SERPL-SCNC: 133 MMOL/L (ref 136–145)
WBC NRBC COR # BLD: 9.82 10*3/MM3 (ref 3.4–10.8)

## 2022-02-09 PROCEDURE — 97530 THERAPEUTIC ACTIVITIES: CPT

## 2022-02-09 PROCEDURE — 63710000001 INSULIN ASPART PER 5 UNITS: Performed by: NURSE PRACTITIONER

## 2022-02-09 PROCEDURE — 86140 C-REACTIVE PROTEIN: CPT | Performed by: NURSE PRACTITIONER

## 2022-02-09 PROCEDURE — 94799 UNLISTED PULMONARY SVC/PX: CPT

## 2022-02-09 PROCEDURE — 63710000001 INSULIN DETEMIR PER 5 UNITS: Performed by: NURSE PRACTITIONER

## 2022-02-09 PROCEDURE — 82962 GLUCOSE BLOOD TEST: CPT

## 2022-02-09 PROCEDURE — 85025 COMPLETE CBC W/AUTO DIFF WBC: CPT | Performed by: NURSE PRACTITIONER

## 2022-02-09 PROCEDURE — 25010000002 ENOXAPARIN PER 10 MG: Performed by: NURSE PRACTITIONER

## 2022-02-09 PROCEDURE — 80053 COMPREHEN METABOLIC PANEL: CPT | Performed by: NURSE PRACTITIONER

## 2022-02-09 PROCEDURE — 63710000001 DEXAMETHASONE PER 0.25 MG: Performed by: NURSE PRACTITIONER

## 2022-02-09 RX ADMIN — ALBUTEROL SULFATE 2 PUFF: 90 AEROSOL, METERED RESPIRATORY (INHALATION) at 19:01

## 2022-02-09 RX ADMIN — PANTOPRAZOLE SODIUM 40 MG: 40 TABLET, DELAYED RELEASE ORAL at 04:59

## 2022-02-09 RX ADMIN — BARICITINIB 4 MG: 2 TABLET, FILM COATED ORAL at 10:45

## 2022-02-09 RX ADMIN — IPRATROPIUM BROMIDE 2 PUFF: 17 AEROSOL, METERED RESPIRATORY (INHALATION) at 19:01

## 2022-02-09 RX ADMIN — ENOXAPARIN SODIUM 40 MG: 40 INJECTION SUBCUTANEOUS at 18:10

## 2022-02-09 RX ADMIN — INSULIN DETEMIR 22 UNITS: 100 INJECTION, SOLUTION SUBCUTANEOUS at 21:28

## 2022-02-09 RX ADMIN — INSULIN ASPART 8 UNITS: 100 INJECTION, SOLUTION INTRAVENOUS; SUBCUTANEOUS at 18:09

## 2022-02-09 RX ADMIN — ALBUTEROL SULFATE 2 PUFF: 90 AEROSOL, METERED RESPIRATORY (INHALATION) at 14:26

## 2022-02-09 RX ADMIN — CANAGLIFLOZIN 300 MG: 300 TABLET, FILM COATED ORAL at 09:20

## 2022-02-09 RX ADMIN — SODIUM CHLORIDE, PRESERVATIVE FREE 10 ML: 5 INJECTION INTRAVENOUS at 20:42

## 2022-02-09 RX ADMIN — IPRATROPIUM BROMIDE 2 PUFF: 17 AEROSOL, METERED RESPIRATORY (INHALATION) at 14:27

## 2022-02-09 RX ADMIN — SODIUM CHLORIDE, PRESERVATIVE FREE 10 ML: 5 INJECTION INTRAVENOUS at 09:20

## 2022-02-09 RX ADMIN — IPRATROPIUM BROMIDE 2 PUFF: 17 AEROSOL, METERED RESPIRATORY (INHALATION) at 10:46

## 2022-02-09 RX ADMIN — LOSARTAN POTASSIUM 25 MG: 25 TABLET, FILM COATED ORAL at 09:20

## 2022-02-09 RX ADMIN — ATORVASTATIN CALCIUM 40 MG: 40 TABLET, FILM COATED ORAL at 20:41

## 2022-02-09 RX ADMIN — BUDESONIDE AND FORMOTEROL FUMARATE DIHYDRATE 2 PUFF: 160; 4.5 AEROSOL RESPIRATORY (INHALATION) at 10:46

## 2022-02-09 RX ADMIN — DEXAMETHASONE 6 MG: 2 TABLET ORAL at 09:20

## 2022-02-09 RX ADMIN — ENOXAPARIN SODIUM 40 MG: 40 INJECTION SUBCUTANEOUS at 04:57

## 2022-02-09 RX ADMIN — ALBUTEROL SULFATE 2 PUFF: 90 AEROSOL, METERED RESPIRATORY (INHALATION) at 10:46

## 2022-02-09 RX ADMIN — BUDESONIDE AND FORMOTEROL FUMARATE DIHYDRATE 2 PUFF: 160; 4.5 AEROSOL RESPIRATORY (INHALATION) at 19:01

## 2022-02-09 RX ADMIN — DEXAMETHASONE 6 MG: 2 TABLET ORAL at 20:41

## 2022-02-09 NOTE — PROGRESS NOTES
[facility] Medicine Services  INPATIENT PROGRESS NOTE    Length of Stay: 10  Date of Admission: 1/30/2022  Primary Care Physician: Bartolo Diaz APRN    Subjective   Chief Complaint: shortness of breath  HPI:  56 year old male with a history of DMII, HTN, and HLD who is admitted with acute respiratory failure due to COVID-19 pneumonia.   Chest x-ray demonstrates improvement. He remains difficult to wean from Airvo. He is frustrated with the duration of his care. He refuses LTACH referral.     Review of Systems   Constitutional: Negative for chills and fever.   Respiratory: Negative for shortness of breath.    Cardiovascular: Negative for chest pain.   Gastrointestinal: Negative for abdominal pain, nausea and vomiting.   All other systems reviewed and are negative.       All pertinent negatives and positives are as above. All other systems have been reviewed and are negative unless otherwise stated.     Objective    Temp:  [97 °F (36.1 °C)-98 °F (36.7 °C)] 97 °F (36.1 °C)  Heart Rate:  [] 88  Resp:  [18-20] 20  BP: (113-138)/(66-83) 121/67    Physical Exam  Vitals reviewed.   Constitutional:       Appearance: Normal appearance. He is not ill-appearing.   HENT:      Head: Normocephalic and atraumatic.   Cardiovascular:      Rate and Rhythm: Normal rate and regular rhythm.   Pulmonary:      Effort: Pulmonary effort is normal.      Breath sounds: Normal breath sounds.   Abdominal:      General: There is no distension.      Palpations: Abdomen is soft.      Tenderness: There is no abdominal tenderness.   Musculoskeletal:         General: No swelling or deformity.   Skin:     General: Skin is warm and dry.   Neurological:      General: No focal deficit present.      Mental Status: He is alert and oriented to person, place, and time.       Results Review:  I have reviewed the labs, radiology results, and diagnostic studies.    Laboratory Data:   Results from  last 7 days   Lab Units 02/09/22  0539 02/08/22  0540 02/07/22  0545   SODIUM mmol/L 133* 135* 132*   POTASSIUM mmol/L 4.3 4.5 4.4   CHLORIDE mmol/L 99 97* 96*   CO2 mmol/L 26.0 25.0 23.0   BUN mg/dL 28* 29* 32*   CREATININE mg/dL 0.67* 0.75* 0.69*   GLUCOSE mg/dL 211* 244* 224*   CALCIUM mg/dL 8.7 8.9 9.0   BILIRUBIN mg/dL 0.6 0.6 0.6   ALK PHOS U/L 157* 150* 144*   ALT (SGPT) U/L 45* 50* 51*   AST (SGOT) U/L 60* 65* 66*   ANION GAP mmol/L 8.0 13.0 13.0     Estimated Creatinine Clearance: 158.7 mL/min (A) (by C-G formula based on SCr of 0.67 mg/dL (L)).          Results from last 7 days   Lab Units 02/09/22  0539 02/08/22  0540 02/07/22  0545 02/06/22  0636 02/05/22  0642   WBC 10*3/mm3 9.82 9.63 7.86 7.62 8.62   HEMOGLOBIN g/dL 14.7 14.9 15.6 15.7 15.7   HEMATOCRIT % 41.6 42.8 44.5 45.7 45.9   PLATELETS 10*3/mm3 194 181 157 164 180           Culture Data:   No results found for: BLOODCX  No results found for: URINECX  No results found for: RESPCX  No results found for: WOUNDCX  No results found for: STOOLCX  No components found for: BODYFLD    Radiology Data:   Imaging Results (Last 24 Hours)     ** No results found for the last 24 hours. **          I have reviewed the patient's current medications.     Assessment/Plan     Active Hospital Problems    Diagnosis    • Acute respiratory failure with hypoxia (HCC)    • Pneumonia due to COVID-19 virus    • COVID-19 virus detected    HTN  DMII    Plan:    Supplemental oxygen, currently Airvo 50 LPM at 94% FiO2, wean as tolerated  Remdesivir not indicated due to duration of illness  Decadron day 11  Baricitinib day 11/14  Trend inflammatory markers  Albuterol MDI, Atrovent MDI, Symbicort  Incentive spirometry, OPEP  BP control: Losartan  Glucose control: Invokana, Levemir, SSI 0-24   PT/OT  VTE PPx: Lovenox BID    Infection control requires 20 days of isolation due to severity of illness.     I confirmed that the patient's Advance Care Plan is present, code status is  documented, or surrogate decision maker is listed in the patient's medical record.     The patient was evaluated during the global COVID-19 pandemic, and the diagnosis was suspected/considered upon their initial presentation.  Evaluation, treatment, and testing were consistent with current guidelines for patients who present with complaints or symptoms that may be related to COVID-19.            This document has been electronically signed by FLORENCE Lemos on February 9, 2022 11:39 CST

## 2022-02-09 NOTE — PLAN OF CARE
Goal Outcome Evaluation:           Progress: declining  Outcome Summary: Patient desatted throughout the night and AirVo had to be titrated back up. Patient currently on 55L at this time. Patient states that he feels better and is not short of breath.

## 2022-02-09 NOTE — PAYOR COMM NOTE
"Magda Rodríguez RN [facility]  [phone]     Phone  [phone]      Fax  Cont. Stay review    Da Falk (56 y.o. Male)             Date of Birth Social Security Number Address Home Phone MRN    1965  [address] [address] [phone] [id number]    Jewish Marital Status             Worship        Admission Date Admission Type Admitting Provider Attending Provider Department, Room/Bed    1/30/22 Emergency Dennis Benson MD [place], Emmanuel [place], MD [facility] [address] [place], 418/1    Discharge Date Discharge Disposition Discharge Destination                         Attending Provider: Dennis Benson MD    Allergies: No Known Allergies    Isolation: Enh Drop/Con   Infection: COVID (confirmed) (01/30/22)   Code Status: CPR   Advance Care Planning Activity    Ht: 182.9 cm (72\")   Wt: 106 kg (233 lb)    Admission Cmt: None   Principal Problem: None                Active Insurance as of 1/30/2022     Primary Coverage     Payor Plan Insurance Group Employer/Plan Group    GLOBAL CARE LBP GLOBAL CARE      Payor Plan Address Payor Plan Phone Number Payor Plan Fax Number Effective Dates    PO  [phone]  1/1/2021 - None Entered    [employer] [place] [id number]       Subscriber Name Subscriber Birth Date Member ID       DA FALK 1965 [id number]                 Emergency Contacts      (Rel.) Home Phone Work Phone Mobile Phone    CAROL FALK (Spouse) [phone] -- --            Vital Signs (last day)     Date/Time Temp Temp src Pulse Resp BP Patient Position SpO2    02/09/22 0951 -- -- 86 -- -- -- --    02/09/22 0919 97.6 (36.4) Oral 90 18 138/83 -- 90    02/09/22 0409 -- -- 81 20 -- -- 90    02/09/22 0318 97.3 (36.3) Oral 84 20 113/66 Sitting 94    02/09/22 0138 -- -- 83 -- -- -- --    02/09/22 0015 -- -- -- -- -- -- 95    02/08/22 2345 -- " -- -- -- -- -- 92    02/08/22 2229 -- -- 88 20 -- -- 90    02/08/22 2137 -- -- -- -- -- -- 89    02/08/22 1936 98 (36.7) Oral 94 20 116/66 Sitting 92    02/08/22 1919 -- -- 102 18 -- -- 91    02/08/22 1715 -- -- 89 -- -- -- --    02/08/22 1614 97.7 (36.5) Oral 88 18 119/73 Sitting 92    02/08/22 1506 -- -- 95 20 -- -- 94    02/08/22 1341 -- -- 104 -- -- -- --    02/08/22 1125 96.6 (35.9) Oral 86 20 118/68 Sitting 94    02/08/22 1042 -- -- -- -- -- -- 94    02/08/22 1041 -- -- 99 20 -- -- 98    02/08/22 0817 98.6 (37) Oral 86 20 119/70 Lying 96    02/08/22 0651 -- -- 84 22 -- -- 93    02/08/22 0427 98.8 (37.1) Oral -- -- -- -- --    02/08/22 0333 96.6 (35.9) Oral 78 18 119/69 Lying 95    02/08/22 0325 -- -- -- -- -- -- 96    02/08/22 0225 -- -- 76 20 -- -- 93    02/08/22 0156 -- -- 76 -- -- -- --    02/08/22 0129 -- -- -- -- -- -- 93          Oxygen Therapy (last day)     Date/Time SpO2 Device (Oxygen Therapy) Flow (L/min) Oxygen Concentration (%) ETCO2 (mmHg)    02/09/22 0919 90 -- -- -- --    02/09/22 0409 90 high-flow nasal cannula; heated; humidified 55 93 --    02/09/22 0318 94 heated; high-flow nasal cannula; humidified 55 93 --    02/09/22 0015 95 humidified; high-flow nasal cannula; heated 55 -- --    02/08/22 2345 92 humidified; heated; high-flow nasal cannula 55 -- --    02/08/22 2229 90 humidified; high-flow nasal cannula; heated 55 87 --    02/08/22 2137 89 -- 55 86 --    02/08/22 1936 92 heated; high-flow nasal cannula; humidified 50 -- --    02/08/22 1919 91 high-flow nasal cannula; heated; humidified 50 83 --    02/08/22 1614 92 heated; high-flow nasal cannula 50 83 --    02/08/22 1506 94 heated; high-flow nasal cannula 50 83 --    02/08/22 1125 94 heated; high-flow nasal cannula 50 85 --    02/08/22 1042 94 heated; high-flow nasal cannula; humidified 50  85  --    02/08/22 1041 98 heated; high-flow nasal cannula; humidified 60 93 --    02/08/22 0817 96 heated; high-flow nasal cannula 60 93 --     02/08/22 0651 93 heated; high-flow nasal cannula; humidified 60 93 --    02/08/22 0333 95 high-flow nasal cannula; humidified 60 -- --    02/08/22 0325 96 high-flow nasal cannula; humidified 60 -- --    02/08/22 0225 93 humidified; high-flow nasal cannula; heated 60 94 --    02/08/22 0129 93 heated; high-flow nasal cannula; humidified 60 -- --          Lab Results (last 24 hours)     Procedure Component Value Units Date/Time    POC Glucose Once [[id number]]  (Abnormal) Collected: 02/09/22 0715    Specimen: Blood Updated: 02/09/22 0818     Glucose 144 mg/dL      Comment: RN NotifiedOperator: [id number] SILVANO FORTELAMeter ID: [id number]       POC Glucose Once [[id number]]  (Abnormal) Collected: 02/08/22 1911    Specimen: Blood Updated: 02/09/22 0642     Glucose 262 mg/dL      Comment: RN NotifiedOperator: [id number] MAURICIO MCCAINeter ID: [id number]       Comprehensive Metabolic Panel [[id number]]  (Abnormal) Collected: 02/09/22 0539    Specimen: Blood Updated: 02/09/22 0634     Glucose 211 mg/dL      BUN 28 mg/dL      Creatinine 0.67 mg/dL      Sodium 133 mmol/L      Potassium 4.3 mmol/L      Chloride 99 mmol/L      CO2 26.0 mmol/L      Calcium 8.7 mg/dL      Total Protein 6.5 g/dL      Albumin 3.40 g/dL      ALT (SGPT) 45 U/L      AST (SGOT) 60 U/L      Alkaline Phosphatase 157 U/L      Total Bilirubin 0.6 mg/dL      eGFR Non African Amer 124 mL/min/1.73      Globulin 3.1 gm/dL      A/G Ratio 1.1 g/dL      BUN/Creatinine Ratio 41.8     Anion Gap 8.0 mmol/L     Narrative:      GFR Normal >60  Chronic Kidney Disease <60  Kidney Failure <15      C-reactive Protein [[id number]]  (Abnormal) Collected: 02/09/22 0539    Specimen: Blood Updated: 02/09/22 0634     C-Reactive Protein 2.81 mg/dL     CBC & Differential [[id number]]  (Abnormal) Collected: 02/09/22 0539    Specimen: Blood Updated: 02/09/22 0608    Narrative:      The following orders were created for panel order CBC & Differential.  Procedure                                Abnormality         Status                     ---------                               -----------         ------                     CBC Auto Differential[[id number]]        Abnormal            Final result                 Please view results for these tests on the individual orders.    CBC Auto Differential [[id number]]  (Abnormal) Collected: 02/09/22 0539    Specimen: Blood Updated: 02/09/22 0608     WBC 9.82 10*3/mm3      RBC 4.83 10*6/mm3      Hemoglobin 14.7 g/dL      Hematocrit 41.6 %      MCV 86.1 fL      MCH 30.4 pg      MCHC 35.3 g/dL      RDW 12.8 %      RDW-SD 39.9 fl      MPV 10.6 fL      Platelets 194 10*3/mm3      Neutrophil % 92.7 %      Lymphocyte % 2.9 %      Monocyte % 2.4 %      Eosinophil % 1.0 %      Basophil % 0.1 %      Immature Grans % 0.9 %      Neutrophils, Absolute 9.10 10*3/mm3      Lymphocytes, Absolute 0.28 10*3/mm3      Monocytes, Absolute 0.24 10*3/mm3      Eosinophils, Absolute 0.10 10*3/mm3      Basophils, Absolute 0.01 10*3/mm3      Immature Grans, Absolute 0.09 10*3/mm3      nRBC 0.0 /100 WBC     POC Glucose Once [[id number]]  (Abnormal) Collected: 02/08/22 1649    Specimen: Blood Updated: 02/08/22 1702     Glucose 253 mg/dL      Comment: RN NotifiedOperator: [id number] RAVINDER DiwaneeWNNEEQuoterollerer ID: [id number]       POC Glucose Once [[id number]]  (Abnormal) Collected: 02/08/22 1126    Specimen: Blood Updated: 02/08/22 1157     Glucose 275 mg/dL      Comment: RN NotifiedOperator: [id number] RAVINDER DiwaneeWNNEEMeter ID: [id number]           Imaging Results (Last 24 Hours)     ** No results found for the last 24 hours. **           Physician Progress Notes (last 24 hours)      Ander Machado APRN at 02/08/22 1154     Attestation signed by Gene Tariq MD at 02/08/22 1319    I personally evaluated and examined the patient in conjunction with FLORENCE Menchaca and agree with the assessment, treatment plan, and disposition of the patient as  recorded.  Cardiovascular: Normal S1 and S2.  Lungs: Distant breath sounds bilaterally.                        [facility] Medicine Services  INPATIENT PROGRESS NOTE    Length of Stay: 9  Date of Admission: 1/30/2022  Primary Care Physician: Bartolo Diaz APRN    Subjective   Chief Complaint: shortness of breath  HPI:  56 year old male with a history of DMII, HTN, and HLD who is admitted with acute respiratory failure due to COVID-19 pneumonia.   Chest x-ray demonstrates improvement. He remains difficult to wean from Airvo. He is frustrated with the duration of his care.     Review of Systems   Constitutional: Negative for chills and fever.   Respiratory: Negative for shortness of breath.    Cardiovascular: Negative for chest pain.   Gastrointestinal: Negative for abdominal pain, nausea and vomiting.   All other systems reviewed and are negative.       All pertinent negatives and positives are as above. All other systems have been reviewed and are negative unless otherwise stated.     Objective    Temp:  [96.6 °F (35.9 °C)-98.8 °F (37.1 °C)] 96.6 °F (35.9 °C)  Heart Rate:  [] 86  Resp:  [18-22] 20  BP: (105-130)/(68-71) 118/68    Physical Exam  Vitals reviewed.   Constitutional:       Appearance: Normal appearance. He is not ill-appearing.   HENT:      Head: Normocephalic and atraumatic.   Cardiovascular:      Rate and Rhythm: Normal rate and regular rhythm.   Pulmonary:      Effort: Pulmonary effort is normal.      Breath sounds: Normal breath sounds.   Abdominal:      General: There is no distension.      Palpations: Abdomen is soft.      Tenderness: There is no abdominal tenderness.   Musculoskeletal:         General: No swelling or deformity.   Skin:     General: Skin is warm and dry.   Neurological:      General: No focal deficit present.      Mental Status: He is alert and oriented to person, place, and time.       Results Review:  I have reviewed the  labs, radiology results, and diagnostic studies.    Laboratory Data:   Results from last 7 days   Lab Units 02/08/22  0540 02/07/22  0545 02/06/22  0636   SODIUM mmol/L 135* 132* 131*   POTASSIUM mmol/L 4.5 4.4 4.5   CHLORIDE mmol/L 97* 96* 95*   CO2 mmol/L 25.0 23.0 24.0   BUN mg/dL 29* 32* 33*   CREATININE mg/dL 0.75* 0.69* 0.78   GLUCOSE mg/dL 244* 224* 188*   CALCIUM mg/dL 8.9 9.0 9.1   BILIRUBIN mg/dL 0.6 0.6 0.7   ALK PHOS U/L 150* 144* 143*   ALT (SGPT) U/L 50* 51* 57*   AST (SGOT) U/L 65* 66* 62*   ANION GAP mmol/L 13.0 13.0 12.0     Estimated Creatinine Clearance: 141.7 mL/min (A) (by C-G formula based on SCr of 0.75 mg/dL (L)).          Results from last 7 days   Lab Units 02/08/22  0540 02/07/22  0545 02/06/22  0636 02/05/22  0642 02/04/22  0655   WBC 10*3/mm3 9.63 7.86 7.62 8.62 5.53   HEMOGLOBIN g/dL 14.9 15.6 15.7 15.7 15.5   HEMATOCRIT % 42.8 44.5 45.7 45.9 45.5   PLATELETS 10*3/mm3 181 157 164 180 165           Culture Data:   No results found for: BLOODCX  No results found for: URINECX  No results found for: RESPCX  No results found for: WOUNDCX  No results found for: STOOLCX  No components found for: BODYFLD    Radiology Data:   Imaging Results (Last 24 Hours)     ** No results found for the last 24 hours. **          I have reviewed the patient's current medications.     Assessment/Plan     Active Hospital Problems    Diagnosis    • Acute respiratory failure with hypoxia (HCC)    • Pneumonia due to COVID-19 virus    • COVID-19 virus detected    HTN  DMII    Plan:    Supplemental oxygen, currently Airvo 50 LPM at 85% FiO2, wean as tolerated  Remdesivir not indicated due to duration of illness  Decadron day 10  Baricitinib day 10/14  Trend inflammatory markers  Albuterol MDI, Atrovent MDI, Symbicort  Incentive spirometry, OPEP  BP control: Losartan  Glucose control: Invokana, Levemir, SSI 0-24   PT/OT  VTE PPx: Lovenox BID      I confirmed that the patient's Advance Care Plan is present, code status  is documented, or surrogate decision maker is listed in the patient's medical record.     The patient was evaluated during the global COVID-19 pandemic, and the diagnosis was suspected/considered upon their initial presentation.  Evaluation, treatment, and testing were consistent with current guidelines for patients who present with complaints or symptoms that may be related to COVID-19.    Approximately 32 minutes of critical care time were spent managing the patient exclusive of billable procedures.           This document has been electronically signed by FLORENCE Lemos on February 8, 2022 11:54 CST       Electronically signed by Gene Tariq MD at 02/08/22 1319       Medical Student Notes (last 24 hours)  Notes from 02/08/22 1002 through 02/09/22 1002   No notes of this type exist for this encounter.         Consult Notes (last 24 hours)  Notes from 02/08/22 1002 through 02/09/22 1002   No notes of this type exist for this encounter.

## 2022-02-09 NOTE — THERAPY TREATMENT NOTE
Acute Care - Physical Therapy Treatment Note  [facility]     Patient Name: Louis Brooks  : 1965  MRN: [id number]  Today's Date: 2022      Visit Dx:     ICD-10-CM ICD-9-CM   1. Pneumonia of both lungs due to infectious organism, unspecified part of lung  J18.9 483.8   2. Acute respiratory failure with hypoxia (HCC)  J96.01 518.81   3. Pneumonia due to COVID-19 virus  U07.1 480.8    J12.82 079.89   4. Impaired functional mobility, balance, gait, and endurance  Z74.09 V49.89     Patient Active Problem List   Diagnosis   • Type 2 diabetes mellitus without complication, without long-term current use of insulin ([facility])   • Elevated BP without diagnosis of hypertension   • Mixed hyperlipidemia   • Class 2 severe obesity due to excess calories with serious comorbidity and body mass index (BMI) of 36.0 to 36.9 in adult ([facility])   • Gastroesophageal reflux disease without esophagitis   • Essential hypertension   • Acute respiratory failure with hypoxia ([facility])   • Pneumonia due to COVID-19 virus   • COVID-19 virus detected     Past Medical History:   Diagnosis Date   • Arthritis    • Class 2 severe obesity due to excess calories with serious comorbidity and body mass index (BMI) of 36.0 to 36.9 in adult ([facility]) 2021   • GERD (gastroesophageal reflux disease)    • Hypertension    • Mixed hyperlipidemia 2021   • Pneumonia due to COVID-19 virus 2022   • Type 2 diabetes mellitus without complication, without long-term current use of insulin ([facility]) 2021     History reviewed. No pertinent surgical history.  PT Assessment (last 12 hours)     PT Evaluation and Treatment     Row Name 22 0933          Physical Therapy Time and Intention    Subjective Information complains of  wanting to come out of isolation  -RW     Document Type therapy note (daily note)  -RW     Mode of Treatment individual therapy; physical therapy  -RW     Patient Effort good  -RW     Row Name 22 0933          General  Information    Patient Profile Reviewed yes  -RW     Existing Precautions/Restrictions oxygen therapy device and L/min  -RW     Row Name 02/09/22 0933          Cognition    Orientation Status (Cognition) oriented x 4  -RW     Row Name 02/09/22 0933          Pain Scale: Numbers Pre/Post-Treatment    Pretreatment Pain Rating 0/10 - no pain  -RW     Posttreatment Pain Rating 0/10 - no pain  -RW     Row Name 02/09/22 0933          Bed Mobility    Bed Mobility supine-sit; sit-supine  -RW     Supine-Sit [place] (Bed Mobility) independent  -RW     Sit-Supine [place] (Bed Mobility) independent  -RW     Row Name 02/09/22 0933          Transfers    Sit-Stand [place] (Transfers) independent  -RW     Row Name 02/09/22 0933          Gait/Stairs (Locomotion)    [place] Level (Gait) independent  -     Row Name 02/09/22 0933          Safety Issues, Functional Mobility    Impairments Affecting Function (Mobility) shortness of breath; endurance/activity tolerance  -     Row Name 02/09/22 0933          Vital Signs    Pretreatment Heart Rate (beats/min) 95  -RW     Intratreatment Heart Rate (beats/min) 105  -RW     Posttreatment Heart Rate (beats/min) 95  -RW     Pre SpO2 (%) 94  -RW     O2 Delivery Pre Treatment hi-flow  -RW     Intra SpO2 (%) 84  -RW     O2 Delivery Intra Treatment hi-flow  -RW     Post SpO2 (%) 93  -RW     O2 Delivery Post Treatment hi-flow  -RW     Row Name 02/09/22 0933          Bed Mobility Goal 1 (PT)    Activity/Assistive Device (Bed Mobility Goal 1, PT) sit to supine/supine to sit  -RW     [place] Level/Cues Needed (Bed Mobility Goal 1, PT) independent  -RW     Time Frame (Bed Mobility Goal 1, PT) by discharge  -RW     Strategies/Barriers (Bed Mobility Goal 1, PT) HOB flat, no bed rails.  -RW     Progress/Outcomes (Bed Mobility Goal 1, PT) goal met  -RW     Row Name 02/09/22 0933          Gait Training Goal 1 (PT)    Activity/Assistive Device (Gait Training Goal 1, PT) gait  (walking locomotion)  -RW     [place] Level (Gait Training Goal 1, PT) independent  -RW     Distance (Gait Training Goal 1, PT) 35'x3.  -RW     Time Frame (Gait Training Goal 1, PT) by discharge  -RW     Strategies/Barriers (Gait Training Goal 1, PT) Maintain SpO2 >90%.  -RW     Progress/Outcome (Gait Training Goal 1, PT) goal not met  -RW     Row Name 02/09/22 0933          Problem Specific Goal 1 (PT)    Problem Specific Goal 1 (PT) Score 28/28 on Tinetti fall risk assessment.  -RW     Time Frame (Problem Specific Goal 1, PT) by discharge  -RW     Strategies/Barriers (Problem Specific Goal 1, PT) Maintain SpO2 >90%.  -RW     Progress/Outcome (Problem Specific Goal 1, PT) goal not met  -RW     Row Name 02/09/22 0933          Positioning and Restraints    Pre-Treatment Position in bed  -RW     Post Treatment Position bed  -RW     In Bed sitting EOB  -RW           User Key  (r) = Recorded By, (t) = Taken By, (c) = Cosigned By    Initials Name Provider Type    RW Yanick Moseley, PTA Physical Therapy Assistant                Physical Therapy Education                 Title: PT OT SLP Therapies (In Progress)     Topic: Physical Therapy (In Progress)     Point: Mobility training (Done)     Learning Progress Summary           Patient Acceptance, E, VU by ARMIN at 2/1/2022 5624    Comment: PT POC, goals, breathing technique.                   Point: Home exercise program (Not Started)     Learner Progress:  Not documented in this visit.          Point: Body mechanics (Not Started)     Learner Progress:  Not documented in this visit.          Point: Precautions (Not Started)     Learner Progress:  Not documented in this visit.                      User Key     Initials Effective Dates Name Provider Type Discipline    ARMIN 06/16/21 -  Teto Storm, PT Physical Therapist PT              PT Recommendation and Plan  Anticipated Discharge Disposition (PT): home, home with outpatient therapy services, long-term acute care  facility  Plan of Care Reviewed With: patient  Progress: improving  Outcome Summary: pt frustrated with being in isolation.had discussion with  LNP concerning the possibility of moving out of prec if cleared wtth infection control. as he sits eob his o2 flucuates from 94 to 84 and back to 95. lots of talking seems to drop his o2 and so does his frustration with his situation. pt is ind with mobility but o2 still not stable.       Time Calculation:    PT Charges     Row Name 02/09/22 1200             Time Calculation    Start Time 0933  -RW      Stop Time 0958  -RW      Time Calculation (min) 25 min  -RW              Time Calculation- PT    Total Timed Code Minutes- PT 25 minute(s)  -RW              Timed Charges    68723 - PT Therapeutic Activity Minutes 25  -RW              Total Minutes    Timed Charges Total Minutes 25  -RW       Total Minutes 25  -RW            User Key  (r) = Recorded By, (t) = Taken By, (c) = Cosigned By    Initials Name Provider Type    RW Yanick Moseley PTA Physical Therapy Assistant              Therapy Charges for Today     Code Description Service Date Service Provider Modifiers Qty    22232422667 HC PT THERAPEUTIC ACT EA 15 MIN 2/9/2022 Yanick Moseley PTA GP 2          PT G-Codes  Outcome Measure Options: AM-PAC 6 Clicks Basic Mobility (PT), [place]etti  AM-PAC 6 Clicks Score (PT): 23    Yanick Moseley PTA  2/9/2022

## 2022-02-09 NOTE — PLAN OF CARE
Goal Outcome Evaluation:  Plan of Care Reviewed With: patient        Progress: improving  Outcome Summary: pt frustrated with being in isolation.had discussion with  LNP concerning the possibility of moving out of prec if cleared wtth infection control. as he sits eob his o2 flucuates from 94 to 84 and back to 95. lots of talking seems to drop his o2 and so does his frustration with his situation. pt is ind with mobility but o2 still not stable.

## 2022-02-10 ENCOUNTER — APPOINTMENT (OUTPATIENT)
Dept: GENERAL RADIOLOGY | Facility: HOSPITAL | Age: 57
End: 2022-02-10

## 2022-02-10 ENCOUNTER — APPOINTMENT (OUTPATIENT)
Dept: INTERVENTIONAL RADIOLOGY/VASCULAR | Facility: HOSPITAL | Age: 57
End: 2022-02-10

## 2022-02-10 ENCOUNTER — APPOINTMENT (OUTPATIENT)
Dept: ULTRASOUND IMAGING | Facility: HOSPITAL | Age: 57
End: 2022-02-10

## 2022-02-10 VITALS
HEIGHT: 72 IN | TEMPERATURE: 96.4 F | BODY MASS INDEX: 30.75 KG/M2 | RESPIRATION RATE: 36 BRPM | WEIGHT: 227 LBS | SYSTOLIC BLOOD PRESSURE: 104 MMHG | HEART RATE: 101 BPM | OXYGEN SATURATION: 94 % | DIASTOLIC BLOOD PRESSURE: 72 MMHG

## 2022-02-10 PROBLEM — D89.834 CYTOKINE RELEASE SYNDROME, GRADE 4: Status: ACTIVE | Noted: 2022-02-10

## 2022-02-10 LAB
ALBUMIN SERPL-MCNC: 3.7 G/DL (ref 3.5–5.2)
ALBUMIN/GLOB SERPL: 1 G/DL
ALP SERPL-CCNC: 213 U/L (ref 39–117)
ALT SERPL W P-5'-P-CCNC: 52 U/L (ref 1–41)
ANION GAP SERPL CALCULATED.3IONS-SCNC: 13 MMOL/L (ref 5–15)
ARTERIAL PATENCY WRIST A: ABNORMAL
ARTERIAL PATENCY WRIST A: ABNORMAL
AST SERPL-CCNC: 70 U/L (ref 1–40)
ATMOSPHERIC PRESS: 748 MMHG
ATMOSPHERIC PRESS: 749 MMHG
BASE EXCESS BLDA CALC-SCNC: -0.4 MMOL/L (ref 0–2)
BASE EXCESS BLDA CALC-SCNC: 0.7 MMOL/L (ref 0–2)
BASOPHILS # BLD AUTO: 0.04 10*3/MM3 (ref 0–0.2)
BASOPHILS NFR BLD AUTO: 0.3 % (ref 0–1.5)
BDY SITE: ABNORMAL
BDY SITE: ABNORMAL
BILIRUB SERPL-MCNC: 0.4 MG/DL (ref 0–1.2)
BUN SERPL-MCNC: 32 MG/DL (ref 6–20)
BUN/CREAT SERPL: 33.3 (ref 7–25)
CALCIUM SPEC-SCNC: 9.5 MG/DL (ref 8.6–10.5)
CHLORIDE SERPL-SCNC: 97 MMOL/L (ref 98–107)
CO2 SERPL-SCNC: 24 MMOL/L (ref 22–29)
CREAT SERPL-MCNC: 0.96 MG/DL (ref 0.76–1.27)
CRP SERPL-MCNC: 5.41 MG/DL (ref 0–0.5)
D-DIMER, QUANTITATIVE (MAD,POW, STR): 3012 NG/ML (FEU) (ref 0–470)
DEPRECATED RDW RBC AUTO: 42.2 FL (ref 37–54)
EOSINOPHIL # BLD AUTO: 0.07 10*3/MM3 (ref 0–0.4)
EOSINOPHIL NFR BLD AUTO: 0.5 % (ref 0.3–6.2)
ERYTHROCYTE [DISTWIDTH] IN BLOOD BY AUTOMATED COUNT: 13.1 % (ref 12.3–15.4)
GFR SERPL CREATININE-BSD FRML MDRD: 82 ML/MIN/1.73
GLOBULIN UR ELPH-MCNC: 3.6 GM/DL
GLUCOSE BLDC GLUCOMTR-MCNC: 203 MG/DL (ref 70–130)
GLUCOSE BLDC GLUCOMTR-MCNC: 205 MG/DL (ref 70–130)
GLUCOSE BLDC GLUCOMTR-MCNC: 232 MG/DL (ref 70–130)
GLUCOSE BLDC GLUCOMTR-MCNC: 243 MG/DL (ref 70–130)
GLUCOSE BLDC GLUCOMTR-MCNC: 243 MG/DL (ref 70–130)
GLUCOSE SERPL-MCNC: 373 MG/DL (ref 65–99)
HCO3 BLDA-SCNC: 24.6 MMOL/L (ref 20–26)
HCO3 BLDA-SCNC: 26.6 MMOL/L (ref 20–26)
HCT VFR BLD AUTO: 45.8 % (ref 37.5–51)
HGB BLD-MCNC: 15.6 G/DL (ref 13–17.7)
HOLD SPECIMEN: NORMAL
IMM GRANULOCYTES # BLD AUTO: 0.1 10*3/MM3 (ref 0–0.05)
IMM GRANULOCYTES NFR BLD AUTO: 0.8 % (ref 0–0.5)
INHALED O2 CONCENTRATION: 100 %
INHALED O2 CONCENTRATION: 90 %
LYMPHOCYTES # BLD AUTO: 0.37 10*3/MM3 (ref 0.7–3.1)
LYMPHOCYTES NFR BLD AUTO: 2.9 % (ref 19.6–45.3)
Lab: ABNORMAL
Lab: ABNORMAL
MCH RBC QN AUTO: 30.1 PG (ref 26.6–33)
MCHC RBC AUTO-ENTMCNC: 34.1 G/DL (ref 31.5–35.7)
MCV RBC AUTO: 88.4 FL (ref 79–97)
MODALITY: ABNORMAL
MODALITY: ABNORMAL
MONOCYTES # BLD AUTO: 0.28 10*3/MM3 (ref 0.1–0.9)
MONOCYTES NFR BLD AUTO: 2.2 % (ref 5–12)
NEUTROPHILS NFR BLD AUTO: 11.9 10*3/MM3 (ref 1.7–7)
NEUTROPHILS NFR BLD AUTO: 93.3 % (ref 42.7–76)
NRBC BLD AUTO-RTO: 0 /100 WBC (ref 0–0.2)
PCO2 BLDA: 36.4 MM HG (ref 35–45)
PCO2 BLDA: 52.1 MM HG (ref 35–45)
PEEP RESPIRATORY: 15 CM[H2O]
PH BLDA: 7.32 PH UNITS (ref 7.35–7.45)
PH BLDA: 7.44 PH UNITS (ref 7.35–7.45)
PLATELET # BLD AUTO: 212 10*3/MM3 (ref 140–450)
PMV BLD AUTO: 11.2 FL (ref 6–12)
PO2 BLDA: 54.5 MM HG (ref 83–108)
PO2 BLDA: 89.1 MM HG (ref 83–108)
POTASSIUM SERPL-SCNC: 5.1 MMOL/L (ref 3.5–5.2)
PROT SERPL-MCNC: 7.3 G/DL (ref 6–8.5)
RBC # BLD AUTO: 5.18 10*6/MM3 (ref 4.14–5.8)
SAO2 % BLDCOA: 87.9 % (ref 94–99)
SAO2 % BLDCOA: 95.4 % (ref 94–99)
SET MECH RESP RATE: 20
SET MECH RESP RATE: 26
SODIUM SERPL-SCNC: 134 MMOL/L (ref 136–145)
VENTILATOR MODE: ABNORMAL
VENTILATOR MODE: AC
VT ON VENT VENT: 440 ML
VT ON VENT VENT: 500 ML
WBC NRBC COR # BLD: 12.76 10*3/MM3 (ref 3.4–10.8)

## 2022-02-10 PROCEDURE — 0BH18EZ INSERTION OF ENDOTRACHEAL AIRWAY INTO TRACHEA, VIA NATURAL OR ARTIFICIAL OPENING ENDOSCOPIC: ICD-10-PCS | Performed by: INTERNAL MEDICINE

## 2022-02-10 PROCEDURE — 94799 UNLISTED PULMONARY SVC/PX: CPT

## 2022-02-10 PROCEDURE — 5A1935Z RESPIRATORY VENTILATION, LESS THAN 24 CONSECUTIVE HOURS: ICD-10-PCS | Performed by: INTERNAL MEDICINE

## 2022-02-10 PROCEDURE — 82962 GLUCOSE BLOOD TEST: CPT

## 2022-02-10 PROCEDURE — 94760 N-INVAS EAR/PLS OXIMETRY 1: CPT

## 2022-02-10 PROCEDURE — 86140 C-REACTIVE PROTEIN: CPT | Performed by: NURSE PRACTITIONER

## 2022-02-10 PROCEDURE — 25010000002 LORAZEPAM PER 2 MG: Performed by: NURSE PRACTITIONER

## 2022-02-10 PROCEDURE — C1751 CATH, INF, PER/CENT/MIDLINE: HCPCS

## 2022-02-10 PROCEDURE — 94660 CPAP INITIATION&MGMT: CPT

## 2022-02-10 PROCEDURE — 94664 DEMO&/EVAL PT USE INHALER: CPT

## 2022-02-10 PROCEDURE — 71045 X-RAY EXAM CHEST 1 VIEW: CPT

## 2022-02-10 PROCEDURE — 36600 WITHDRAWAL OF ARTERIAL BLOOD: CPT

## 2022-02-10 PROCEDURE — 25010000002 PROPOFOL 10 MG/ML EMULSION: Performed by: INTERNAL MEDICINE

## 2022-02-10 PROCEDURE — 85025 COMPLETE CBC W/AUTO DIFF WBC: CPT | Performed by: NURSE PRACTITIONER

## 2022-02-10 PROCEDURE — 25010000002 SUCCINYLCHOLINE PER 20 MG: Performed by: INTERNAL MEDICINE

## 2022-02-10 PROCEDURE — 31500 INSERT EMERGENCY AIRWAY: CPT | Performed by: INTERNAL MEDICINE

## 2022-02-10 PROCEDURE — 82803 BLOOD GASES ANY COMBINATION: CPT

## 2022-02-10 PROCEDURE — 80053 COMPREHEN METABOLIC PANEL: CPT | Performed by: NURSE PRACTITIONER

## 2022-02-10 PROCEDURE — 94002 VENT MGMT INPAT INIT DAY: CPT

## 2022-02-10 PROCEDURE — 25010000002 PROPOFOL 10 MG/ML EMULSION

## 2022-02-10 PROCEDURE — 25010000002 MIDAZOLAM 50 MG/10ML SOLUTION: Performed by: INTERNAL MEDICINE

## 2022-02-10 PROCEDURE — 63710000001 INSULIN ASPART PER 5 UNITS: Performed by: NURSE PRACTITIONER

## 2022-02-10 PROCEDURE — 25010000002 ENOXAPARIN PER 10 MG: Performed by: NURSE PRACTITIONER

## 2022-02-10 PROCEDURE — 63710000001 DEXAMETHASONE PER 0.25 MG: Performed by: NURSE PRACTITIONER

## 2022-02-10 PROCEDURE — 85379 FIBRIN DEGRADATION QUANT: CPT | Performed by: INTERNAL MEDICINE

## 2022-02-10 RX ORDER — BUDESONIDE AND FORMOTEROL FUMARATE DIHYDRATE 160; 4.5 UG/1; UG/1
2 AEROSOL RESPIRATORY (INHALATION)
Qty: 6 G | Refills: 0
Start: 2022-02-10

## 2022-02-10 RX ORDER — SODIUM CHLORIDE 0.9 % (FLUSH) 0.9 %
10 SYRINGE (ML) INJECTION EVERY 12 HOURS SCHEDULED
Status: DISCONTINUED | OUTPATIENT
Start: 2022-02-10 | End: 2022-02-10 | Stop reason: HOSPADM

## 2022-02-10 RX ORDER — FENTANYL CITRATE 50 UG/ML
50 INJECTION, SOLUTION INTRAMUSCULAR; INTRAVENOUS
Qty: 20 ML | Refills: 0
Start: 2022-02-10 | End: 2022-02-17

## 2022-02-10 RX ORDER — BENZONATATE 100 MG/1
100 CAPSULE ORAL 3 TIMES DAILY PRN
Qty: 6 CAPSULE | Refills: 0
Start: 2022-02-10

## 2022-02-10 RX ORDER — CHLORHEXIDINE GLUCONATE 0.12 MG/ML
15 RINSE ORAL EVERY 12 HOURS SCHEDULED
Status: DISCONTINUED | OUTPATIENT
Start: 2022-02-10 | End: 2022-02-10 | Stop reason: HOSPADM

## 2022-02-10 RX ORDER — NOREPINEPHRINE BIT/0.9 % NACL 8 MG/250ML
.02-.3 INFUSION BOTTLE (ML) INTRAVENOUS
Status: DISCONTINUED | OUTPATIENT
Start: 2022-02-10 | End: 2022-02-10 | Stop reason: HOSPADM

## 2022-02-10 RX ORDER — FENTANYL CITRATE 50 UG/ML
50 INJECTION, SOLUTION INTRAMUSCULAR; INTRAVENOUS
Status: DISCONTINUED | OUTPATIENT
Start: 2022-02-10 | End: 2022-02-10 | Stop reason: HOSPADM

## 2022-02-10 RX ORDER — LORAZEPAM 2 MG/ML
1 INJECTION INTRAMUSCULAR EVERY 6 HOURS PRN
Status: DISCONTINUED | OUTPATIENT
Start: 2022-02-10 | End: 2022-02-10 | Stop reason: HOSPADM

## 2022-02-10 RX ORDER — SODIUM CHLORIDE 9 MG/ML
50 INJECTION, SOLUTION INTRAVENOUS CONTINUOUS
Status: DISCONTINUED | OUTPATIENT
Start: 2022-02-10 | End: 2022-02-10 | Stop reason: HOSPADM

## 2022-02-10 RX ORDER — NOREPINEPHRINE BIT/0.9 % NACL 8 MG/250ML
.02-.3 INFUSION BOTTLE (ML) INTRAVENOUS
Qty: 250 ML | Refills: 0
Start: 2022-02-10

## 2022-02-10 RX ORDER — FAMOTIDINE 10 MG/ML
20 INJECTION, SOLUTION INTRAVENOUS 2 TIMES DAILY
Qty: 4 ML | Refills: 0
Start: 2022-02-10

## 2022-02-10 RX ORDER — SODIUM CHLORIDE 9 MG/ML
50 INJECTION, SOLUTION INTRAVENOUS CONTINUOUS
Qty: 500 ML | Refills: 0 | Status: SHIPPED | OUTPATIENT
Start: 2022-02-10

## 2022-02-10 RX ORDER — SUCCINYLCHOLINE CHLORIDE 20 MG/ML
100 INJECTION INTRAMUSCULAR; INTRAVENOUS ONCE
Status: COMPLETED | OUTPATIENT
Start: 2022-02-10 | End: 2022-02-10

## 2022-02-10 RX ORDER — FAMOTIDINE 10 MG/ML
20 INJECTION, SOLUTION INTRAVENOUS 2 TIMES DAILY
Status: DISCONTINUED | OUTPATIENT
Start: 2022-02-10 | End: 2022-02-10 | Stop reason: HOSPADM

## 2022-02-10 RX ORDER — ETOMIDATE 2 MG/ML
20 INJECTION INTRAVENOUS ONCE
Status: COMPLETED | OUTPATIENT
Start: 2022-02-10 | End: 2022-02-10

## 2022-02-10 RX ORDER — DEXAMETHASONE 6 MG/1
6 TABLET ORAL EVERY 12 HOURS SCHEDULED
Qty: 12 TABLET | Refills: 0
Start: 2022-02-10

## 2022-02-10 RX ORDER — DEXTROMETHORPHAN POLISTIREX 30 MG/5ML
60 SUSPENSION ORAL EVERY 12 HOURS PRN
Qty: 280 ML | Refills: 0
Start: 2022-02-10

## 2022-02-10 RX ORDER — CHLORHEXIDINE GLUCONATE 0.12 MG/ML
15 RINSE ORAL EVERY 12 HOURS SCHEDULED
Qty: 15 ML | Refills: 0
Start: 2022-02-10

## 2022-02-10 RX ORDER — PROPOFOL 10 MG/ML
VIAL (ML) INTRAVENOUS
Status: COMPLETED
Start: 2022-02-10 | End: 2022-02-10

## 2022-02-10 RX ORDER — SODIUM CHLORIDE 0.9 % (FLUSH) 0.9 %
20 SYRINGE (ML) INJECTION AS NEEDED
Status: DISCONTINUED | OUTPATIENT
Start: 2022-02-10 | End: 2022-02-10 | Stop reason: HOSPADM

## 2022-02-10 RX ORDER — ALBUTEROL SULFATE 90 UG/1
2 AEROSOL, METERED RESPIRATORY (INHALATION)
Qty: 6.7 G | Refills: 0
Start: 2022-02-10

## 2022-02-10 RX ORDER — SODIUM CHLORIDE 0.9 % (FLUSH) 0.9 %
10 SYRINGE (ML) INJECTION AS NEEDED
Status: DISCONTINUED | OUTPATIENT
Start: 2022-02-10 | End: 2022-02-10 | Stop reason: HOSPADM

## 2022-02-10 RX ADMIN — DEXAMETHASONE 6 MG: 2 TABLET ORAL at 09:20

## 2022-02-10 RX ADMIN — FAMOTIDINE 20 MG: 10 INJECTION INTRAVENOUS at 12:54

## 2022-02-10 RX ADMIN — CANAGLIFLOZIN 300 MG: 300 TABLET, FILM COATED ORAL at 09:20

## 2022-02-10 RX ADMIN — PANTOPRAZOLE SODIUM 40 MG: 40 TABLET, DELAYED RELEASE ORAL at 06:24

## 2022-02-10 RX ADMIN — PROPOFOL 5 MCG/KG/MIN: 10 INJECTION, EMULSION INTRAVENOUS at 11:15

## 2022-02-10 RX ADMIN — ETOMIDATE 20 MG: 2 INJECTION, SOLUTION INTRAVENOUS at 11:15

## 2022-02-10 RX ADMIN — CHLORHEXIDINE GLUCONATE 15 ML: 1.2 SOLUTION ORAL at 12:54

## 2022-02-10 RX ADMIN — SODIUM CHLORIDE 50 ML/HR: 9 INJECTION, SOLUTION INTRAVENOUS at 12:55

## 2022-02-10 RX ADMIN — PROPOFOL 50 MCG/KG/MIN: 10 INJECTION, EMULSION INTRAVENOUS at 13:05

## 2022-02-10 RX ADMIN — SUCCINYLCHOLINE CHLORIDE 100 MG: 20 INJECTION, SOLUTION INTRAMUSCULAR; INTRAVENOUS at 12:49

## 2022-02-10 RX ADMIN — LOSARTAN POTASSIUM 25 MG: 25 TABLET, FILM COATED ORAL at 09:20

## 2022-02-10 RX ADMIN — MIDAZOLAM 1 MG/HR: 5 INJECTION, SOLUTION INTRAMUSCULAR; INTRAVENOUS at 11:20

## 2022-02-10 RX ADMIN — INSULIN ASPART 8 UNITS: 100 INJECTION, SOLUTION INTRAVENOUS; SUBCUTANEOUS at 12:55

## 2022-02-10 RX ADMIN — BARICITINIB 4 MG: 2 TABLET, FILM COATED ORAL at 09:21

## 2022-02-10 RX ADMIN — MIDAZOLAM 10 MG/HR: 5 INJECTION, SOLUTION INTRAMUSCULAR; INTRAVENOUS at 15:04

## 2022-02-10 RX ADMIN — INSULIN ASPART 8 UNITS: 100 INJECTION, SOLUTION INTRAVENOUS; SUBCUTANEOUS at 09:20

## 2022-02-10 RX ADMIN — ENOXAPARIN SODIUM 40 MG: 40 INJECTION SUBCUTANEOUS at 06:24

## 2022-02-10 RX ADMIN — LORAZEPAM 1 MG: 2 INJECTION INTRAMUSCULAR; INTRAVENOUS at 10:04

## 2022-02-10 RX ADMIN — ALBUTEROL SULFATE 2 PUFF: 90 AEROSOL, METERED RESPIRATORY (INHALATION) at 06:52

## 2022-02-10 RX ADMIN — IPRATROPIUM BROMIDE 2 PUFF: 17 AEROSOL, METERED RESPIRATORY (INHALATION) at 06:52

## 2022-02-10 RX ADMIN — BUDESONIDE AND FORMOTEROL FUMARATE DIHYDRATE 2 PUFF: 160; 4.5 AEROSOL RESPIRATORY (INHALATION) at 06:52

## 2022-02-10 RX ADMIN — SODIUM CHLORIDE, PRESERVATIVE FREE 10 ML: 5 INJECTION INTRAVENOUS at 09:21

## 2022-02-10 NOTE — DISCHARGE PLACEMENT REQUEST
"Da Falk (56 y.o. Male)             Date of Birth Social Security Number Address Home Phone MRN    1965  [address] [place] [address] [phone] [id number]    Zoroastrian Marital Status             Cheondoism        Admission Date Admission Type Admitting Provider Attending Provider Department, Room/Bed    1/30/22 Emergency Dennis Benson MD Ebenibo, Sotonte E, MD [facility] CRITICAL CARE STEPDOWN, 04/A    Discharge Date Discharge Disposition Discharge Destination           [facility] ([facility] - [facility]              Attending Provider: Adama Douglas MD    Allergies: No Known Allergies    Isolation: Enh Drop/Con   Infection: COVID (confirmed) (01/30/22)   Code Status: CPR   Advance Care Planning Activity    Ht: 182.9 cm (72\")   Wt: 103 kg (227 lb)    Admission Cmt: None   Principal Problem: Acute respiratory failure with hypoxia (HCC) [J96.01]                 Active Insurance as of 1/30/2022     Primary Coverage     Payor Plan Insurance Group Employer/Plan Group    GLOBAL C[employer] LBP GLOBAL CARE      Payor Plan Address Payor Plan Phone Number Payor Plan Fax Number Effective Dates    PO  [phone]  1/1/2021 - None Entered    [facility] [id number]       Subscriber Name Subscriber Birth Date Member ID       DA FALK 1965 [id number]                 Emergency Contacts      (Rel.) Home Phone Work Phone Mobile Phone    CAROL FALK (Spouse) [phone] -- --            Insurance Information                GLOBAL CARE LBP/[employer]AL [employer]BP Phone: [phone]    Subscriber: Da Falk Subscriber#: [id number]    Group#: [id number] Precert#: --             History & Physical      Socorro Calderon APRN at 01/30/22 1249     Attestation signed by Dennis Benson MD at 01/30/22 5932    I attest that I saw and evaluated Mr. Da Falk with FLORENCE Barrios.  I have reviewed " this documentation and agree.                          [facility] Medicine Admission      Date of Admission: 1/30/2022      Primary Care Physician: Bartolo Diaz APRN      Chief Complaint: shortness of breath, diarrhea     HPI: 56 year old male with past medical history of Type 2 DM, HTN, HLD who presented on 1/30/22 with complaints of diarrhea and shortness of breath related to covid 19.  He reports first feeling ill with covid three weeks ago with symptoms of chills, aches, diarrhea, dry cough, anorexia, fatigue and felt better after 4-5 days but symptoms returned this week with diarrhea, cough and now dyspnea as well.  He was evaluated in ED and will be admitted for acute hypoxic respiratory failure related to covid 19 viral pneumonia.     Concurrent Medical History:  has a past medical history of Arthritis, Class 2 severe obesity due to excess calories with serious comorbidity and body mass index (BMI) of 36.0 to 36.9 in adult ([facility]) (2/25/2021), GERD (gastroesophageal reflux disease), Hypertension, Mixed hyperlipidemia (2/25/2021), Pneumonia due to COVID-19 virus (1/30/2022), and Type 2 diabetes mellitus without complication, without long-term current use of insulin ([facility]) (2/25/2021).    Past Surgical History:  has no past surgical history on file.    Family History: family history includes Heart disease in his father and mother; Migraines in his son; No Known Problems in his brother, daughter, daughter, sister, and sister; Osteoporosis in his maternal grandmother.    Social History:  reports that he has never smoked. He has never used smokeless tobacco. He reports that he does not drink alcohol and does not use drugs.    Allergies: No Known Allergies    Medications:   Prior to Admission medications    Medication Sig Start Date End Date Taking? Authorizing Provider   atorvastatin (Lipitor) 40 MG tablet Take 1 tablet by mouth Every Night. 1/29/21  Yes Bartolo Diaz  FLORENCE   empagliflozin (Jardiance) 25 MG tablet tablet Take 1 tablet by mouth Daily. 7/20/21  Yes Bartolo Diaz APRN   glucose blood test strip 1 each by Other route 2 (two) times a day. Use as instructed 7/19/21  Yes Bartolo Diaz APRN   losartan (COZAAR) 25 MG tablet Take 1 tablet by mouth once daily 11/29/21  Yes Bartolo Diaz APRN   metFORMIN ER (GLUCOPHAGE-XR) 500 MG 24 hr tablet Take 2 tablets by mouth 2 (Two) Times a Day With Meals. 1/28/21  Yes Bartolo Diaz APRN   omeprazole (priLOSEC) 20 MG capsule Take 20 mg by mouth Daily.   Yes Provider, MD Anai   tadalafil (CIALIS) 5 MG tablet TAKE 1 TABLET BY MOUTH ONCE DAILY AS NEEDED FOR ERECTILE DYSFUNCTION 12/28/21  Yes Bartolo Diaz APRN   triamcinolone (KENALOG) 0.1 % cream Apply  topically to the appropriate area as directed 2 (Two) Times a Day As Needed for Rash. 7/19/21   Bartolo Diaz APRN       Review of Systems:  Review of Systems   Constitutional: Positive for activity change, appetite change and fatigue. Negative for chills and fever.   HENT: Negative for congestion, rhinorrhea and sore throat.    Respiratory: Positive for cough and shortness of breath. Negative for chest tightness and wheezing.    Cardiovascular: Negative for chest pain, palpitations and leg swelling.   Gastrointestinal: Positive for diarrhea. Negative for abdominal pain, nausea and vomiting.   Musculoskeletal: Negative for back pain and neck pain.   Skin: Negative for pallor.   Neurological: Positive for weakness. Negative for dizziness and headaches.   Psychiatric/Behavioral: Negative for confusion. The patient is not nervous/anxious.             Physical Exam:   Temp:  [98.4 °F (36.9 °C)] 98.4 °F (36.9 °C)  Heart Rate:  [82-90] 86  Resp:  [18-20] 20  BP: (118-147)/(63-78) 121/70  Physical Exam  Vitals and nursing note reviewed.   Constitutional:       General: He is not in acute distress.     Appearance: Normal appearance. He is obese. He is  ill-appearing.   HENT:      Head: Normocephalic and atraumatic.      Right Ear: External ear normal.      Left Ear: External ear normal.      Nose: Nose normal.      Mouth/Throat:      Mouth: Mucous membranes are moist.      Pharynx: Oropharynx is clear.   Eyes:      General: No scleral icterus.        Right eye: No discharge.         Left eye: No discharge.      Conjunctiva/sclera: Conjunctivae normal.   Cardiovascular:      Rate and Rhythm: Normal rate and regular rhythm.      Pulses: Normal pulses.      Heart sounds: Normal heart sounds. No murmur heard.  No friction rub. No gallop.    Pulmonary:      Effort: Pulmonary effort is normal. No respiratory distress.      Breath sounds: Normal breath sounds. No stridor. No wheezing, rhonchi or rales.   Abdominal:      General: Bowel sounds are normal. There is no distension.      Palpations: Abdomen is soft.      Tenderness: There is no abdominal tenderness.   Musculoskeletal:         General: No swelling. Normal range of motion.      Cervical back: Normal range of motion and neck supple.   Skin:     General: Skin is warm and dry.   Neurological:      General: No focal deficit present.      Mental Status: He is alert and oriented to person, place, and time.   Psychiatric:         Mood and Affect: Mood normal.         Behavior: Behavior normal.           Results Reviewed:  I have personally reviewed current lab, radiology, and data and agree with results.  Lab Results (last 24 hours)     Procedure Component Value Units Date/Time    CBC & Differential [[id number]]  (Abnormal) Collected: 01/30/22 1141    Specimen: Blood from Arm, Right Updated: 01/30/22 1317    Narrative:      The following orders were created for panel order CBC & Differential.  Procedure                               Abnormality         Status                     ---------                               -----------         ------                     CBC Auto Differential[[id number]]        Abnormal             Final result               Scan Slide[[id number]]                                       Final result                 Please view results for these tests on the individual orders.    Scan Slide [[id number]] Collected: 01/30/22 1141    Specimen: Blood from Arm, Right Updated: 01/30/22 1317     Anisocytosis Slight/1+     WBC Morphology Normal     Platelet Estimate Decreased    Blood Culture - Blood, Arm, Right [[id number]] Collected: 01/30/22 1254    Specimen: Blood from Arm, Right Updated: 01/30/22 1258    [place] Draw [[id number]] Collected: 01/30/22 1050    Specimen: Blood Updated: 01/30/22 1245    Narrative:      The following orders were created for panel order [place] Draw.  Procedure                               Abnormality         Status                     ---------                               -----------         ------                     Green Top (Gel)[[id number]]                                  Final result               Lavender Top[[id number]]                                     Final result               Gold Top - SST[[id number]]                                   Final result               Light Blue Top[[id number]]                                   Final result                 Please view results for these tests on the individual orders.    Lavender Top [[id number]] Collected: 01/30/22 1141    Specimen: Blood from Arm, Right Updated: 01/30/22 1245     Extra Tube hold for add-on     Comment: Auto resulted       Procalcitonin [[id number]]  (Normal) Collected: 01/30/22 1050    Specimen: Blood Updated: 01/30/22 1233     Procalcitonin 0.13 ng/mL     Narrative:      As a Marker for Sepsis (Non-Neonates):     1. <0.5 ng/mL represents a low risk of severe sepsis and/or septic shock.  2. >2 ng/mL represents a high risk of severe sepsis and/or septic shock.    As a Marker for Lower Respiratory Tract Infections that require antibiotic therapy:  PCT on Admission     Antibiotic Therapy             6-12 Hrs  "later  >0.5                          Strongly Recommended            >0.25 - <0.5             Recommended  0.1 - 0.25                  Discouraged                       Remeasure/reassess PCT  <0.1                         Strongly Discouraged         Remeasure/reassess PCT      As 28 day mortality risk marker: \"Change in Procalcitonin Result\" (>80% or <=80%) if Day 0 (or Day 1) and Day 4 values are available. Refer to http://www.[facility]-pct-calculator.com/    Change in PCT <=80 %   A decrease of PCT levels below or equal to 80% defines a positive change in PCT test result representing a higher risk for 28-day all-cause mortality of patients diagnosed with severe sepsis or septic shock.    Change in PCT >80 %   A decrease of PCT levels of more than 80% defines a negative change in PCT result representing a lower risk for 28-day all-cause mortality of patients diagnosed with severe sepsis or septic shock.                Gold Top - SST [[id number]] Collected: 01/30/22 1050    Specimen: Blood Updated: 01/30/22 1200     Extra Tube Hold for add-ons.     Comment: Auto resulted.       Light Blue Top [[id number]] Collected: 01/30/22 1050    Specimen: Blood Updated: 01/30/22 1200     Extra Tube hold for add-on     Comment: Auto resulted       Green Top (Gel) [[id number]] Collected: 01/30/22 1050    Specimen: Blood Updated: 01/30/22 1200     Extra Tube Hold for add-ons.     Comment: Auto resulted.       CBC Auto Differential [[id number]]  (Abnormal) Collected: 01/30/22 1141    Specimen: Blood from Arm, Right Updated: 01/30/22 1149     WBC 2.33 10*3/mm3      RBC 5.01 10*6/mm3      Hemoglobin 15.0 g/dL      Hematocrit 43.6 %      MCV 87.0 fL      MCH 29.9 pg      MCHC 34.4 g/dL      RDW 13.8 %      RDW-SD 43.8 fl      MPV 10.6 fL      Platelets 80 10*3/mm3      Neutrophil % 67.9 %      Lymphocyte % 25.3 %      Monocyte % 6.0 %      Eosinophil % 0.0 %      Basophil % 0.4 %      Immature Grans % 0.4 %      Neutrophils, Absolute 1.58 " 10*3/mm3      Lymphocytes, Absolute 0.59 10*3/mm3      Monocytes, Absolute 0.14 10*3/mm3      Eosinophils, Absolute 0.00 10*3/mm3      Basophils, Absolute 0.01 10*3/mm3      Immature Grans, Absolute 0.01 10*3/mm3      nRBC 0.0 /100 WBC     COVID-19 and FLU A/B PCR - Swab, Nasopharynx [[id number]]  (Abnormal) Collected: 01/30/22 1055    Specimen: Swab from Nasopharynx Updated: 01/30/22 1126     COVID19 Detected     Comment: Enhanced Precautions Requested          Influenza A PCR Not Detected     Influenza B PCR Not Detected    Narrative:      Fact sheet for providers: https://www.fda.gov/media/361757/download    Fact sheet for patients: https://www.fda.gov/media/022114/download    Test performed by PCR.  Influenza A and Influenza B negative results should be considered presumptive in samples that have a positive SARS-CoV-2 result.    Competitive inhibition studies showed that SARS-CoV-2 virus, when present at concentrations above 3.6E+04 copies/mL, can inhibit the detection and amplification of influenza A and influenza B virus RNA if present at or below 1.8E+02 copies/mL or 4.9E+02 copies/mL, respectively, and may lead to false negative influenza virus results. If co-infection with influenza A or influenza B virus is suspected in samples with a positive SARS-CoV-2 result, the sample should be re-tested with another FDA cleared, approved, or authorized influenza test, if influenza virus detection would change clinical management.    Comprehensive Metabolic Panel [[id number]]  (Abnormal) Collected: 01/30/22 1050    Specimen: Blood Updated: 01/30/22 1123     Glucose 203 mg/dL      BUN 20 mg/dL      Creatinine 1.20 mg/dL      Sodium 141 mmol/L      Potassium 3.9 mmol/L      Chloride 99 mmol/L      CO2 28.0 mmol/L      Calcium 9.3 mg/dL      Total Protein 7.7 g/dL      Albumin 4.30 g/dL      ALT (SGPT) 33 U/L      AST (SGOT) 76 U/L      Alkaline Phosphatase 98 U/L      Total Bilirubin 0.4 mg/dL      eGFR Non  Amer  63 mL/min/1.73      Globulin 3.4 gm/dL      A/G Ratio 1.3 g/dL      BUN/Creatinine Ratio 16.7     Anion Gap 14.0 mmol/L     Narrative:      GFR Normal >60  Chronic Kidney Disease <60  Kidney Failure <15      D-dimer, Quantitative [[id number]]  (Abnormal) Collected: 01/30/22 1050    Specimen: Blood Updated: 01/30/22 1122     D-Dimer, Quantitative 985 ng/mL (FEU)     Narrative:      Dimer values <500 ng/ml FEU are FDA approved as aid in diagnosis of deep venous thrombosis and pulmonary embolism.  This test should not be used in an exclusion strategy with pretest probability alone.    A recent guideline regarding diagnosis for pulmonary thromboembolism recommends an adjusted exclusion criterion of age x 10 ng/ml FEU for patients >50 years of age ([employer] Intern Med 2015; 163: 701-711).      Troponin [[id number]]  (Normal) Collected: 01/30/22 1050    Specimen: Blood Updated: 01/30/22 1122     Troponin T <0.010 ng/mL     Narrative:      Troponin T Reference Range:  <= 0.03 ng/mL-   Negative for AMI  >0.03 ng/mL-     Abnormal for myocardial necrosis.  Clinicians would have to utilize clinical acumen, EKG, Troponin and serial changes to determine if it is an Acute Myocardial Infarction or myocardial injury due to an underlying chronic condition.       Results may be falsely decreased if patient taking Biotin.      BNP [[id number]]  (Normal) Collected: 01/30/22 1050    Specimen: Blood Updated: 01/30/22 1121     proBNP 27.6 pg/mL     Narrative:      Among patients with dyspnea, NT-proBNP is highly sensitive for the detection of acute congestive heart failure. In addition NT-proBNP of <300 pg/ml effectively rules out acute congestive heart failure with 99% negative predictive value.    Results may be falsely decreased if patient taking Biotin.      Extra Tubes [[id number]] Collected: 01/30/22 1050    Specimen: Blood, Venous Line Updated: 01/30/22 1102    Narrative:      The following orders were created for panel order Extra  Tubes.  Procedure                               Abnormality         Status                     ---------                               -----------         ------                     Gonzalez Top[[id number]]                                         In process                   Please view results for these tests on the individual orders.    Gray Top [[id number]] Collected: 01/30/22 1050    Specimen: Blood Updated: 01/30/22 1102        Imaging Results (Last 24 Hours)     Procedure Component Value Units Date/Time    CT Angiogram Chest [[id number]] Collected: 01/30/22 1152     Updated: 01/30/22 1224    Narrative:      EXAM:  CT CHEST ANGIOGRAPHY WITH IV CONTRAST    ORDERING PROVIDER:  BECCA COHEN    CLINICAL HISTORY:  Shortness of breath, diabetes, hypertension    COMPARISON:  Chest radiograph of same date    TECHNIQUE:   Chest CT was performed using a high resolution pulmonary  angiogram protocol with 58ml of Isovue 370 contrast and  reformatted in the sagittal and coronal planes.     3-dimensional images also acquired with special processing of the  CT scan data with a specialized workstation for evaluation.    This examination was performed according to our departmental dose  optimization program which includes automated exposure control,  adjustment of the MA and kV according to patient size, and/or use  of iterative reconstruction technique.     FINDINGS:     LUNGS AND PLEURA: Bilateral diffuse groundglass opacities in the  lung parenchyma due to viral pneumonitis. No evidence of pleural  effusion. No pneumothorax or mediastinal shift. No gross  endotracheal or endobronchial lesion. There is no evidence of  pleural effusion.    HEART: Mild prominent size and unremarkable configuration. No  pericardial effusion.     MEDIASTINUM AND PALMIRA: No significant adenopathy.     AORTA AND GREAT VESSELS: No aneurysm or dissection.     PULMONARY ARTERIES: No filling defects in the central portion.  Assessment of branches distal to  the segmental branches are  limited due to suboptimal contrast.    UPPER ABDOMEN: Diffuse fatty change of liver..    MUSCULOSKELETAL:  No aggressive osseous lesion. Normal vertebral  body height and alignment. No lytic or sclerotic lesion.     EXTRATHORACIC SOFT TISSUES: No axillary adenopathy. No mass.  Unremarkable supraclavicular soft tissues.       Impression:      1.  No evidence of central pulmonary embolism.  2.  Assessment of branches of bilateral pulmonary artery distal  to the segmental branches are limited due to suboptimal contrast.  3.  Bilateral viral pneumonitis.    Electronically signed by:  Johan Terrell MD  1/30/2022 12:22 PM CST  Workstation: [id number]    XR Chest 1 View [[id number]] Collected: 01/30/22 1053     Updated: 01/30/22 1121    Narrative:      AP upright portable chest January 30, 2022 10:42 AM    INDICATION: Exacerbation of COPD.    FINDINGS:  No old films available for comparison at this time.  Lungs hypoinflated.  Patchy bilateral infiltrates left greater than right.  No pneumothorax or pleural fluid.      Impression:      Asymmetric patchy infiltrates left greater than right.    Electronically signed by:  Yury Banuelos MD  1/30/2022 11:19 AM  CST Workstation: [id number]            Assessment:    Active Hospital Problems    Diagnosis    • Acute respiratory failure with hypoxia (HCC)    • Pneumonia due to COVID-19 virus              Plan:  1. Acute hypoxic respiratory failure related to covid 19 viral pneumonia: continue o2 support (currently on 5 liters).  Initiate decadron, Albuterol, Atrovent, Symbicort, Lovenox.  Trend covid monitoring labs.  Patient is outside treatment window for use of Remdesivir as he first fell ill three weeks ago.  CTA rules out PE.   2. HTN: continue home dose of Losartan.  3. Type 2 DM: FSBS AC and HS with SSI, continue Jardiance.   4. HLD: continue statin.     Further orders will depend upon hospital course.  Plan of care discussed with patient.  Code  status confirmed with patient and his wishes are for full code status in the event of emergency.  He designates his wife, Migdalia Brooks, as his decision maker in the event of emergency.    I confirmed that the patient's Advance Care Plan is present, code status is documented, or surrogate decision maker is listed in the patient's medical record.      I have utilized all available immediate resources to obtain, update, or review the patient's current medications.          This document has been electronically signed by FLORENCE Barrios on January 30, 2022 13:43 CST                    Electronically signed by Dennis Benson MD at 01/30/22 1545         Current Facility-Administered Medications   Medication Dose Route Frequency Provider Last Rate Last Admin   • acetaminophen (TYLENOL) tablet 650 mg  650 mg Oral Q4H PRN Socorro Calderon APRN        Or   • acetaminophen (TYLENOL) suppository 650 mg  650 mg Rectal Q4H PRN Socorro Calderon APRN       • albuterol sulfate HFA (PROVENTIL HFA;VENTOLIN HFA;PROAIR HFA) inhaler 2 puff  2 puff Inhalation 4x Daily - RT Socorro Calderon APRN   2 puff at 02/10/22 0652   • atorvastatin (LIPITOR) tablet 40 mg  40 mg Oral Nightly Socorro Calderon APRN   40 mg at 02/09/22 2041   • baricitinib (OLUMIANT) tablet 4 mg  4 mg Oral Daily Socorro Calderon APRN   4 mg at 02/10/22 0921   • benzonatate (TESSALON) capsule 100 mg  100 mg Oral TID PRN Socorro Calderon APRN       • budesonide-formoterol (SYMBICORT) 160-4.5 MCG/ACT inhaler 2 puff  2 puff Inhalation BID - RT Socorro Calderon APRN   2 puff at 02/10/22 0652   • calcium carbonate (TUMS) chewable tablet 500 mg (200 mg elemental)  2 tablet Oral BID PRN Socorro Calderon APRN   2 tablet at 02/03/22 0840   • Canagliflozin (INVOKANA) 300 MG tablet tablet 300 mg  300 mg Oral Daily Socorro Calderon APRN   300 mg at 02/10/22 0920   • chlorhexidine (PERIDEX) 0.12 % solution 15 mL  15 mL Mouth/Throat Q12H Adama Douglas MD    15 mL at 02/10/22 1254   • dexamethasone (DECADRON) tablet 6 mg  6 mg Oral Q12H Socorro Calderon APRN   6 mg at 02/10/22 0920    Or   • dexamethasone (DECADRON) injection 6 mg  6 mg Intravenous Q12H Socorro Calderon APRN   6 mg at 02/08/22 0819   • dextromethorphan polistirex ER (DELSYM) 30 MG/5ML oral suspension 60 mg  60 mg Oral Q12H PRN Socorro Calderon APRN       • dextrose (D50W) (25 g/50 mL) IV injection 25 g  25 g Intravenous Q15 Min PRN Socorro Calderon APRN       • dextrose (GLUTOSE) oral gel 15 g  15 g Oral Q15 Min PRN Socorro Calderon APRN       • enoxaparin (LOVENOX) syringe 40 mg  40 mg Subcutaneous Q12H Ander Machado APRN   40 mg at 02/10/22 0624   • famotidine (PEPCID) injection 20 mg  20 mg Intravenous BID Adama Douglas MD   20 mg at 02/10/22 1254   • fentaNYL citrate (PF) (SUBLIMAZE) injection 50 mcg  50 mcg Intravenous Q30 Min PRN Adama Douglas MD       • glucagon (human recombinant) (GLUCAGEN DIAGNOSTIC) injection 1 mg  1 mg Subcutaneous Q15 Min PRN Socorro Calderon APRN       • insulin aspart (novoLOG) injection 0-24 Units  0-24 Units Subcutaneous TID AC Ander Machado APRN   8 Units at 02/10/22 1255   • insulin detemir (LEVEMIR) injection 22 Units  22 Units Subcutaneous Nightly Ander Machado APRN   22 Units at 02/09/22 2128   • ipratropium (ATROVENT HFA) inhaler 2 puff  2 puff Inhalation 4x Daily - RT Socorro Calderon APRN   2 puff at 02/10/22 0652   • loperamide (IMODIUM) capsule 2 mg  2 mg Oral 4x Daily PRN Socorro Calderon APRN       • LORazepam (ATIVAN) injection 1 mg  1 mg Intravenous Q6H PRN Laura Kidd APRN   1 mg at 02/10/22 1004   • losartan (COZAAR) tablet 25 mg  25 mg Oral Daily Socorro Calderon APRN   25 mg at 02/10/22 0920   • melatonin tablet 5.25 mg  5.25 mg Oral Nightly PRN Socorro Calderon APRN       • midazolam (VERSED) 50mg in 100 mL NS infusion  1-10 mg/hr Intravenous Titrated Adama Douglas MD 20 mL/hr at 02/10/22  1145 10 mg/hr at 02/10/22 1145   • norepinephrine (LEVOPHED) 8 mg in 250 mL NS infusion (premix)  0.02-0.3 mcg/kg/min Intravenous Titrated Adama Douglas MD       • ondansetron (ZOFRAN) tablet 4 mg  4 mg Oral Q6H PRN Socorro Calderon APRN   4 mg at 02/02/22 0337    Or   • ondansetron (ZOFRAN) injection 4 mg  4 mg Intravenous Q6H PRN Socorro Calderon APRN       • Pharmacy Consult - Pharmacy to dose   Does not apply Continuous PRN Socorro Calderon APRN       • propofol (DIPRIVAN) infusion 10 mg/mL 100 mL  5-50 mcg/kg/min Intravenous Titrated Adama Douglas MD 30.9 mL/hr at 02/10/22 1305 50 mcg/kg/min at 02/10/22 1305   • sodium chloride 0.9 % flush 10 mL  10 mL Intravenous PRN Johnathan Soares MD       • sodium chloride 0.9 % flush 10 mL  10 mL Intravenous Q12H Socorro Calderon APRN   10 mL at 02/10/22 0921   • sodium chloride 0.9 % flush 10 mL  10 mL Intravenous PRN Socorro Calderon APRN       • sodium chloride 0.9 % infusion  50 mL/hr Intravenous Continuous Adama Douglas MD 50 mL/hr at 02/10/22 1255 50 mL/hr at 02/10/22 1255     Lab Results (most recent)     Procedure Component Value Units Date/Time    Extra Tubes [[id number]] Collected: 02/10/22 1409    Specimen: Blood, Venous Line Updated: 02/10/22 1409    Narrative:      The following orders were created for panel order Extra Tubes.  Procedure                               Abnormality         Status                     ---------                               -----------         ------                     Green Top (Gel)[[id number]]                                  In process                   Please view results for these tests on the individual orders.    Green Top (Gel) [[id number]] Collected: 02/10/22 1409    Specimen: Blood Updated: 02/10/22 1409    D-dimer, Quantitative [[id number]]  (Abnormal) Collected: 02/10/22 1256    Specimen: Blood Updated: 02/10/22 1324     D-Dimer, Quantitative 3,012 ng/mL (FEU)     Narrative:      Dimer values <500  ng/ml FEU are FDA approved as aid in diagnosis of deep venous thrombosis and pulmonary embolism.  This test should not be used in an exclusion strategy with pretest probability alone.    A recent guideline regarding diagnosis for pulmonary thromboembolism recommends an adjusted exclusion criterion of age x 10 ng/ml FEU for patients >50 years of age ([facility] Intern Med 2015; 163: 701-711).      POC Glucose Once [[id number]]  (Abnormal) Collected: 02/10/22 1253    Specimen: Blood Updated: 02/10/22 1313     Glucose 232 mg/dL      Comment: RN NotifiedOperator: [id number] DOMENICA AMYMeter ID: [id number]       Blood Gas, Arterial - [[id number]]  (Abnormal) Collected: 02/10/22 1258    Specimen: Arterial Blood Updated: 02/10/22 1306     Site Arterial Line     Fernando's Test N/A     pH, Arterial 7.317 pH units      Comment: 84 Value below reference range        pCO2, Arterial 52.1 mm Hg      Comment: 83 Value above reference range        pO2, Arterial 89.1 mm Hg      HCO3, Arterial 26.6 mmol/L      Comment: 83 Value above reference range        Base Excess, Arterial -0.4 mmol/L      Comment: 84 Value below reference range        O2 Saturation, Arterial 95.4 %      Barometric Pressure for Blood Gas 748 mmHg      Modality Ventilator     FIO2 90 %      Ventilator Mode AC     Set Tidal Volume 440     Set Mech Resp Rate 26.0     PEEP 15.0     Collected by RALEIGH WAYNE     Comment: Meter: [id number]     :  [id number]       POC Glucose Once [[id number]]  (Abnormal) Collected: 02/10/22 1046    Specimen: Blood Updated: 02/10/22 1208     Glucose 243 mg/dL      Comment: RN NotifiedOperator: [id number] RAVINDER LEWISEEMeter ID: [id number]       Blood Gas, Arterial - [[id number]]  (Abnormal) Collected: 02/10/22 1026    Specimen: Arterial Blood Updated: 02/10/22 1035     Site Left Radial     Fernando's Test N/A     pH, Arterial 7.438 pH units      pCO2, Arterial 36.4 mm Hg      pO2, Arterial 54.5 mm Hg      Comment: 85 Value below critical  limit        HCO3, Arterial 24.6 mmol/L      Base Excess, Arterial 0.7 mmol/L      O2 Saturation, Arterial 87.9 %      Comment: 84 Value below reference range        Barometric Pressure for Blood Gas 749 mmHg      Modality BiPap     FIO2 100 %      Ventilator Mode AVAP     Set Tidal Volume 500     Set Mech Resp Rate 20.0     Collected by RALEIGH MORILLO. RRT     Comment: Meter: [id number]     :  [id number]       Comprehensive Metabolic Panel [[id number]]  (Abnormal) Collected: 02/10/22 0542    Specimen: Blood Updated: 02/10/22 0647     Glucose 373 mg/dL      BUN 32 mg/dL      Creatinine 0.96 mg/dL      Sodium 134 mmol/L      Potassium 5.1 mmol/L      Comment: Slight hemolysis detected by analyzer. Results may be affected.        Chloride 97 mmol/L      CO2 24.0 mmol/L      Calcium 9.5 mg/dL      Total Protein 7.3 g/dL      Albumin 3.70 g/dL      ALT (SGPT) 52 U/L      AST (SGOT) 70 U/L      Comment: Slight hemolysis detected by analyzer. Results may be affected.        Alkaline Phosphatase 213 U/L      Total Bilirubin 0.4 mg/dL      eGFR Non African Amer 82 mL/min/1.73      Globulin 3.6 gm/dL      A/G Ratio 1.0 g/dL      BUN/Creatinine Ratio 33.3     Anion Gap 13.0 mmol/L     Narrative:      GFR Normal >60  Chronic Kidney Disease <60  Kidney Failure <15      C-reactive Protein [[id number]]  (Abnormal) Collected: 02/10/22 0542    Specimen: Blood Updated: 02/10/22 0638     C-Reactive Protein 5.41 mg/dL     CBC & Differential [[id number]]  (Abnormal) Collected: 02/10/22 0542    Specimen: Blood Updated: 02/10/22 0623    Narrative:      The following orders were created for panel order CBC & Differential.  Procedure                               Abnormality         Status                     ---------                               -----------         ------                     CBC Auto Differential[[id number]]        Abnormal            Final result                 Please view results for these tests on the  individual orders.    CBC Auto Differential [[id number]]  (Abnormal) Collected: 02/10/22 0542    Specimen: Blood Updated: 02/10/22 0623     WBC 12.76 10*3/mm3      RBC 5.18 10*6/mm3      Hemoglobin 15.6 g/dL      Hematocrit 45.8 %      MCV 88.4 fL      MCH 30.1 pg      MCHC 34.1 g/dL      RDW 13.1 %      RDW-SD 42.2 fl      MPV 11.2 fL      Platelets 212 10*3/mm3      Neutrophil % 93.3 %      Lymphocyte % 2.9 %      Monocyte % 2.2 %      Eosinophil % 0.5 %      Basophil % 0.3 %      Immature Grans % 0.8 %      Neutrophils, Absolute 11.90 10*3/mm3      Lymphocytes, Absolute 0.37 10*3/mm3      Monocytes, Absolute 0.28 10*3/mm3      Eosinophils, Absolute 0.07 10*3/mm3      Basophils, Absolute 0.04 10*3/mm3      Immature Grans, Absolute 0.10 10*3/mm3      nRBC 0.0 /100 WBC     Comprehensive Metabolic Panel [[id number]]  (Abnormal) Collected: 02/09/22 0539    Specimen: Blood Updated: 02/09/22 0634     Glucose 211 mg/dL      BUN 28 mg/dL      Creatinine 0.67 mg/dL      Sodium 133 mmol/L      Potassium 4.3 mmol/L      Chloride 99 mmol/L      CO2 26.0 mmol/L      Calcium 8.7 mg/dL      Total Protein 6.5 g/dL      Albumin 3.40 g/dL      ALT (SGPT) 45 U/L      AST (SGOT) 60 U/L      Alkaline Phosphatase 157 U/L      Total Bilirubin 0.6 mg/dL      eGFR Non African Amer 124 mL/min/1.73      Globulin 3.1 gm/dL      A/G Ratio 1.1 g/dL      BUN/Creatinine Ratio 41.8     Anion Gap 8.0 mmol/L     Narrative:      GFR Normal >60  Chronic Kidney Disease <60  Kidney Failure <15      C-reactive Protein [[id number]]  (Abnormal) Collected: 02/09/22 0539    Specimen: Blood Updated: 02/09/22 0634     C-Reactive Protein 2.81 mg/dL     CBC & Differential [[id number]]  (Abnormal) Collected: 02/09/22 0539    Specimen: Blood Updated: 02/09/22 0608    Narrative:      The following orders were created for panel order CBC & Differential.  Procedure                               Abnormality         Status                     ---------                                -----------         ------                     CBC Auto Differential[[id number]]        Abnormal            Final result                 Please view results for these tests on the individual orders.    CBC Auto Differential [[id number]]  (Abnormal) Collected: 02/09/22 0539    Specimen: Blood Updated: 02/09/22 0608     WBC 9.82 10*3/mm3      RBC 4.83 10*6/mm3      Hemoglobin 14.7 g/dL      Hematocrit 41.6 %      MCV 86.1 fL      MCH 30.4 pg      MCHC 35.3 g/dL      RDW 12.8 %      RDW-SD 39.9 fl      MPV 10.6 fL      Platelets 194 10*3/mm3      Neutrophil % 92.7 %      Lymphocyte % 2.9 %      Monocyte % 2.4 %      Eosinophil % 1.0 %      Basophil % 0.1 %      Immature Grans % 0.9 %      Neutrophils, Absolute 9.10 10*3/mm3      Lymphocytes, Absolute 0.28 10*3/mm3      Monocytes, Absolute 0.24 10*3/mm3      Eosinophils, Absolute 0.10 10*3/mm3      Basophils, Absolute 0.01 10*3/mm3      Immature Grans, Absolute 0.09 10*3/mm3      nRBC 0.0 /100 WBC     Blood Culture - Blood, Arm, Left [[id number]]  (Normal) Collected: 01/30/22 1437    Specimen: Blood from Arm, Left Updated: 02/04/22 1445     Blood Culture No growth at 5 days    Blood Culture - Blood, Arm, Right [[id number]]  (Normal) Collected: 01/30/22 1254    Specimen: Blood from Arm, Right Updated: 02/04/22 1300     Blood Culture No growth at 5 days    Procalcitonin [[id number]]  (Normal) Collected: 02/04/22 0655    Specimen: Blood Updated: 02/04/22 0745     Procalcitonin 0.06 ng/mL     Narrative:      As a Marker for Sepsis (Non-Neonates):     1. <0.5 ng/mL represents a low risk of severe sepsis and/or septic shock.  2. >2 ng/mL represents a high risk of severe sepsis and/or septic shock.    As a Marker for Lower Respiratory Tract Infections that require antibiotic therapy:  PCT on Admission     Antibiotic Therapy             6-12 Hrs later  >0.5                          Strongly Recommended            >0.25 - <0.5             Recommended  0.1 - 0.25              "     Discouraged                       Remeasure/reassess PCT  <0.1                         Strongly Discouraged         Remeasure/reassess PCT      As 28 day mortality risk marker: \"Change in Procalcitonin Result\" (>80% or <=80%) if Day 0 (or Day 1) and Day 4 values are available. Refer to http://www.[employer]-pct-calculator.[email or web address]/    Change in PCT <=80 %   A decrease of PCT levels below or equal to 80% defines a positive change in PCT test result representing a higher risk for 28-day all-cause mortality of patients diagnosed with severe sepsis or septic shock.    Change in PCT >80 %   A decrease of PCT levels of more than 80% defines a negative change in PCT result representing a lower risk for 28-day all-cause mortality of patients diagnosed with severe sepsis or septic shock.                Ferritin [[id number]]  (Abnormal) Collected: 02/04/22 0655    Specimen: Blood Updated: 02/04/22 0738     Ferritin 1,849.00 ng/mL     Narrative:      Results may be falsely decreased if patient taking Biotin.      Lactate Dehydrogenase [[id number]]  (Abnormal) Collected: 02/04/22 0655    Specimen: Blood Updated: 02/04/22 0732      U/L     D-dimer, Quantitative [[id number]]  (Abnormal) Collected: 02/04/22 0655    Specimen: Blood Updated: 02/04/22 0728     D-Dimer, Quantitative 507 ng/mL (FEU)     Narrative:      Dimer values <500 ng/ml FEU are FDA approved as aid in diagnosis of deep venous thrombosis and pulmonary embolism.  This test should not be used in an exclusion strategy with pretest probability alone.    A recent guideline regarding diagnosis for pulmonary thromboembolism recommends an adjusted exclusion criterion of age x 10 ng/ml FEU for patients >50 years of age ([employer] Intern Med 2015; 163: 701-711).      Manual Differential [[id number]]  (Abnormal) Collected: 02/03/22 0624    Specimen: Blood Updated: 02/03/22 0748     Neutrophil % 73.0 %      Lymphocyte % 9.0 %      Monocyte % 6.0 %      Bands %  10.0 %      Atypical " Lymphocyte % 2.0 %      Neutrophils Absolute 4.01 10*3/mm3      Lymphocytes Absolute 0.53 10*3/mm3      Monocytes Absolute 0.29 10*3/mm3      Anisocytosis Slight/1+     WBC Morphology Normal     Platelet Estimate Decreased    Ferritin [[id number]]  (Abnormal) Collected: 02/03/22 0624    Specimen: Blood Updated: 02/03/22 0742     Ferritin 2,313.00 ng/mL     Narrative:      Results may be falsely decreased if patient taking Biotin.      Extra Tubes [[id number]] Collected: 02/03/22 0624    Specimen: Blood, Venous Line Updated: 02/03/22 0730    Narrative:      The following orders were created for panel order Extra Tubes.  Procedure                               Abnormality         Status                     ---------                               -----------         ------                     Gold Top - SST[[id number]]                                   Final result                 Please view results for these tests on the individual orders.    Gold Top - SST [[id number]] Collected: 02/03/22 0624    Specimen: Blood Updated: 02/03/22 0730     Extra Tube Hold for add-ons.     Comment: Auto resulted.       Lactate Dehydrogenase [[id number]]  (Abnormal) Collected: 02/02/22 0547    Specimen: Blood Updated: 02/02/22 0652      U/L     Procalcitonin [[id number]]  (Normal) Collected: 02/02/22 0548    Specimen: Blood Updated: 02/02/22 0650     Procalcitonin 0.08 ng/mL     Narrative:      As a Marker for Sepsis (Non-Neonates):     1. <0.5 ng/mL represents a low risk of severe sepsis and/or septic shock.  2. >2 ng/mL represents a high risk of severe sepsis and/or septic shock.    As a Marker for Lower Respiratory Tract Infections that require antibiotic therapy:  PCT on Admission     Antibiotic Therapy             6-12 Hrs later  >0.5                          Strongly Recommended            >0.25 - <0.5             Recommended  0.1 - 0.25                  Discouraged                       Remeasure/reassess PCT  <0.1               "           Strongly Discouraged         Remeasure/reassess PCT      As 28 day mortality risk marker: \"Change in Procalcitonin Result\" (>80% or <=80%) if Day 0 (or Day 1) and Day 4 values are available. Refer to http://www.[email or web address] [place]pct[place][email or web address]/    Change in PCT <=80 %   A decrease of PCT levels below or equal to 80% defines a positive change in PCT test result representing a higher risk for 28-day all-cause mortality of patients diagnosed with severe sepsis or septic shock.    Change in PCT >80 %   A decrease of PCT levels of more than 80% defines a negative change in PCT result representing a lower risk for 28-day all-cause mortality of patients diagnosed with severe sepsis or septic shock.                Gold Top - SST [[id number]] Collected: 02/01/22 0609    Specimen: Blood Updated: 02/01/22 0715     Extra Tube Hold for add-ons.     Comment: Auto resulted.       Gray Top [[id number]] Collected: 01/30/22 1050    Specimen: Blood Updated: 01/30/22 1500     Extra Tube Hold for add-ons.     Comment: Auto resulted.       Scan Slide [[id number]] Collected: 01/30/22 1141    Specimen: Blood from Arm, Right Updated: 01/30/22 1317     Anisocytosis Slight/1+     WBC Morphology Normal     Platelet Estimate Decreased    [place] Draw [[id number]] Collected: 01/30/22 1050    Specimen: Blood Updated: 01/30/22 1245    Narrative:      The following orders were created for panel order [place] Draw.  Procedure                               Abnormality         Status                     ---------                               -----------         ------                     Green Top (Gel)[[id number]]                                  Final result               Lavender Top[[id number]]                                     Final result               Gold Top - SST[[id number]]                                   Final result               Light Blue Top[[id number]]                                   Final result                 Please view results " for these tests on the individual orders.    Lavender Top [[id number]] Collected: 01/30/22 1141    Specimen: Blood from Arm, Right Updated: 01/30/22 1245     Extra Tube hold for add-on     Comment: Auto resulted       Light Blue Top [[id number]] Collected: 01/30/22 1050    Specimen: Blood Updated: 01/30/22 1200     Extra Tube hold for add-on     Comment: Auto resulted       Green Top (Gel) [[id number]] Collected: 01/30/22 1050    Specimen: Blood Updated: 01/30/22 1200     Extra Tube Hold for add-ons.     Comment: Auto resulted.       COVID-19 and FLU A/B PCR - Swab, Nasopharynx [[id number]]  (Abnormal) Collected: 01/30/22 1055    Specimen: Swab from Nasopharynx Updated: 01/30/22 1126     COVID19 Detected     Comment: Enhanced Precautions Requested          Influenza A PCR Not Detected     Influenza B PCR Not Detected    Narrative:      Fact sheet for providers: https://www.fda.gov/media/276928/download    Fact sheet for patients: https://www.fda.gov/media/595300/download    Test performed by PCR.  Influenza A and Influenza B negative results should be considered presumptive in samples that have a positive SARS-CoV-2 result.    Competitive inhibition studies showed that SARS-CoV-2 virus, when present at concentrations above 3.6E+04 copies/mL, can inhibit the detection and amplification of influenza A and influenza B virus RNA if present at or below 1.8E+02 copies/mL or 4.9E+02 copies/mL, respectively, and may lead to false negative influenza virus results. If co-infection with influenza A or influenza B virus is suspected in samples with a positive SARS-CoV-2 result, the sample should be re-tested with another FDA cleared, approved, or authorized influenza test, if influenza virus detection would change clinical management.    Troponin [[id number]]  (Normal) Collected: 01/30/22 1050    Specimen: Blood Updated: 01/30/22 1122     Troponin T <0.010 ng/mL     Narrative:      Troponin T Reference Range:  <= 0.03 ng/mL-    Negative for AMI  >0.03 ng/mL-     Abnormal for myocardial necrosis.  Clinicians would have to utilize clinical acumen, EKG, Troponin and serial changes to determine if it is an Acute Myocardial Infarction or myocardial injury due to an underlying chronic condition.       Results may be falsely decreased if patient taking Biotin.      BNP [[id number]]  (Normal) Collected: 01/30/22 1050    Specimen: Blood Updated: 01/30/22 1121     proBNP 27.6 pg/mL     Narrative:      Among patients with dyspnea, NT-proBNP is highly sensitive for the detection of acute congestive heart failure. In addition NT-proBNP of <300 pg/ml effectively rules out acute congestive heart failure with 99% negative predictive value.    Results may be falsely decreased if patient taking Biotin.

## 2022-02-10 NOTE — SIGNIFICANT NOTE
RT ARRIVED TO DO ABG AND INITIATE BIPAP? PT AGREED AND PLACED ON BIPAP FOR LOW OXYGEN LEVELS. PT IS NOW CONFUSED AND SEEING PEOPLE IN THE ROOM THAT ARE NOT THERE. RN MADE AWARE THAT PT IS NOW HAVING CONFUSION. PT SATS DROPPED AS LOW AS 55% WHEN SWITCHING FROM AIRVO TO BIPAP WHICH ONLY TOOK A   MATTER OF 10 SECONDS OR LESS FOR SWITCHOVER. EXPLAINED TO PT REASON HE IS CONFUSED

## 2022-02-10 NOTE — PLAN OF CARE
Goal Outcome Evaluation:  Progress: improving  Outcome Summary: patient remains on HHF 60L 95%. VSS. No complaints of pain.

## 2022-02-10 NOTE — PROGRESS NOTES
[facility] Medicine Services  INPATIENT PROGRESS NOTE    Length of Stay: 11  Date of Admission: 1/30/2022  Primary Care Physician: Bartolo Diaz APRN    Subjective   Chief Complaint: Shortness of breath    HPI: Patient was having worsening shortness of breath and respiratory failure with hypoxia despite high flow nasal cannula on BiPAP therapy.  He was transferred to the ICU for endotracheal intubation and mechanical ventilation.  Patient and his wife gave consent for procedure.    Review of Systems   Unable to perform ROS: Severe respiratory distress     Objective    Temp:  [96.9 °F (36.1 °C)-98.4 °F (36.9 °C)] 96.9 °F (36.1 °C)  Heart Rate:  [] 107  Resp:  [18-33] 33  BP: (111-133)/(62-79) 116/65  FiO2 (%):  [90 %-100 %] 90 %    Physical Exam  Constitutional:       General: He is not in acute distress.     Appearance: He is well-developed. He is ill-appearing. He is not diaphoretic.   HENT:      Head: Normocephalic.   Eyes:      General: No scleral icterus.     Conjunctiva/sclera: Conjunctivae normal.      Pupils: Pupils are equal, round, and reactive to light.   Neck:      Thyroid: No thyromegaly.      Vascular: No JVD.      Trachea: No tracheal deviation.   Cardiovascular:      Rate and Rhythm: Regular rhythm. Tachycardia present.      Heart sounds: Normal heart sounds. No murmur heard.  No friction rub. No gallop.    Pulmonary:      Effort: Respiratory distress present.      Breath sounds: No stridor. No wheezing or rales.      Comments: Reduced breath sounds globally.  Chest:      Chest wall: No tenderness.   Abdominal:      General: Bowel sounds are normal. There is no distension.      Palpations: Abdomen is soft. There is no mass.      Tenderness: There is no abdominal tenderness. There is no guarding or rebound.      Hernia: No hernia is present.   Musculoskeletal:         General: No tenderness or deformity. Normal range of motion.      Cervical back:  Normal range of motion and neck supple.      Right lower leg: No edema.      Left lower leg: No edema.   Lymphadenopathy:      Cervical: No cervical adenopathy.   Skin:     General: Skin is warm and dry.      Coloration: Skin is not pale.      Findings: No erythema or rash.   Neurological:      General: No focal deficit present.      Mental Status: He is alert and oriented to person, place, and time.      Cranial Nerves: No cranial nerve deficit.      Sensory: No sensory deficit.      Motor: No abnormal muscle tone.      Coordination: Coordination normal.   Psychiatric:         Mood and Affect: Mood normal.         Behavior: Behavior normal.       Medication Review:    Current Facility-Administered Medications:   •  acetaminophen (TYLENOL) tablet 650 mg, 650 mg, Oral, Q4H PRN **OR** [DISCONTINUED] acetaminophen (TYLENOL) 160 MG/5ML solution 650 mg, 650 mg, Oral, Q4H PRN **OR** acetaminophen (TYLENOL) suppository 650 mg, 650 mg, Rectal, Q4H PRN, Socorro Calderon, APRN  •  albuterol sulfate HFA (PROVENTIL HFA;VENTOLIN HFA;PROAIR HFA) inhaler 2 puff, 2 puff, Inhalation, 4x Daily - RT, Socorro Calderon APRN, 2 puff at 02/10/22 0652  •  atorvastatin (LIPITOR) tablet 40 mg, 40 mg, Oral, Nightly, Socorro Calderon, APRN, 40 mg at 02/09/22 2041  •  baricitinib (OLUMIANT) tablet 4 mg, 4 mg, Oral, Daily, Socorro Calderon APRN, 4 mg at 02/10/22 0921  •  benzonatate (TESSALON) capsule 100 mg, 100 mg, Oral, TID PRN, Socorro Calderon APRN  •  budesonide-formoterol (SYMBICORT) 160-4.5 MCG/ACT inhaler 2 puff, 2 puff, Inhalation, BID - RT, Socorro Calderon APRN, 2 puff at 02/10/22 0652  •  calcium carbonate (TUMS) chewable tablet 500 mg (200 mg elemental), 2 tablet, Oral, BID PRN, Socorro Calderon APRN, 2 tablet at 02/03/22 0840  •  Canagliflozin (INVOKANA) 300 MG tablet tablet 300 mg, 300 mg, Oral, Daily, Socorro Calderon APRN, 300 mg at 02/10/22 0920  •  dexamethasone (DECADRON) tablet 6 mg, 6 mg, Oral, Q12H, 6 mg at 02/10/22 0920  **OR** dexamethasone (DECADRON) injection 6 mg, 6 mg, Intravenous, Q12H, Socorro Calderon APRMARTINE, 6 mg at 02/08/22 0819  •  dextromethorphan polistirex ER (DELSYM) 30 MG/5ML oral suspension 60 mg, 60 mg, Oral, Q12H PRN, Socorro Calderon, APRN  •  dextrose (D50W) (25 g/50 mL) IV injection 25 g, 25 g, Intravenous, Q15 Min PRN, Socorro Calderon APRN  •  dextrose (GLUTOSE) oral gel 15 g, 15 g, Oral, Q15 Min PRN, Socorro Calderon APRN  •  enoxaparin (LOVENOX) syringe 40 mg, 40 mg, Subcutaneous, Q12H, Ander Machado APRN, 40 mg at 02/10/22 0624  •  glucagon (human recombinant) (GLUCAGEN DIAGNOSTIC) injection 1 mg, 1 mg, Subcutaneous, Q15 Min PRN, Socorro Calderon APRMARTINE  •  insulin aspart (novoLOG) injection 0-24 Units, 0-24 Units, Subcutaneous, TID AC, Ander Machado, [place], 8 Units at 02/10/22 0920  •  insulin detemir (LEVEMIR) injection 22 Units, 22 Units, Subcutaneous, Nightly, Ander Machado APRN, 22 Units at 02/09/22 2128  •  ipratropium (ATROVENT HFA) inhaler 2 puff, 2 puff, Inhalation, 4x Daily - RT, Socorro Calderon APRN, 2 puff at 02/10/22 0652  •  loperamide (IMODIUM) capsule 2 mg, 2 mg, Oral, 4x Daily PRN, Socorro Calderon APRN  •  LORazepam (ATIVAN) injection 1 mg, 1 mg, Intravenous, Q6H PRN, Marti Laura G, APRN, 1 mg at 02/10/22 1004  •  losartan (COZAAR) tablet 25 mg, 25 mg, Oral, Daily, Socorro Calderon APRN, 25 mg at 02/10/22 0920  •  melatonin tablet 5.25 mg, 5.25 mg, Oral, Nightly PRN, Socorro Calderon APRN  •  midazolam (VERSED) 50mg in 100 mL NS infusion, 1-10 mg/hr, Intravenous, Titrated, Adama Douglas MD  •  norepinephrine (LEVOPHED) 8 mg in 250 mL NS infusion (premix), 0.02-0.3 mcg/kg/min, Intravenous, Titrated, Adama Douglas MD  •  ondansetron (ZOFRAN) tablet 4 mg, 4 mg, Oral, Q6H PRN, 4 mg at 02/02/22 0337 **OR** ondansetron (ZOFRAN) injection 4 mg, 4 mg, Intravenous, Q6H PRN, Socorro Calderon APRN  •  pantoprazole (PROTONIX) EC tablet 40 mg, 40 mg, Oral, Q  AM, Ander Machado, [place], 40 mg at 02/10/22 0624  •  Pharmacy Consult - Pharmacy to dose, , Does not apply, Continuous PRN, Socorro Calderon APRN  •  Propofol (DIPRIVAN) 10 MG/ML injection  - ADS Override Pull, , , ,   •  propofol (DIPRIVAN) infusion 10 mg/mL 100 mL, 5-50 mcg/kg/min, Intravenous, Titrated, Adama Douglas MD  •  sodium chloride 0.9 % flush 10 mL, 10 mL, Intravenous, PRN, Johnathan Soares MD  •  sodium chloride 0.9 % flush 10 mL, 10 mL, Intravenous, Q12H, Socorro Calderon APRN, 10 mL at 02/10/22 0921  •  sodium chloride 0.9 % flush 10 mL, 10 mL, Intravenous, PRN, Socorro Calderon APRN    I have reviewed the patient's current medications.     Results Review:  I have reviewed the labs, radiology results, and diagnostic studies.    Laboratory Data:   Results from last 7 days   Lab Units 02/10/22  0542 02/09/22  0539 02/08/22  0540   SODIUM mmol/L 134* 133* 135*   POTASSIUM mmol/L 5.1 4.3 4.5   CHLORIDE mmol/L 97* 99 97*   CO2 mmol/L 24.0 26.0 25.0   BUN mg/dL 32* 28* 29*   CREATININE mg/dL 0.96 0.67* 0.75*   GLUCOSE mg/dL 373* 211* 244*   CALCIUM mg/dL 9.5 8.7 8.9   BILIRUBIN mg/dL 0.4 0.6 0.6   ALK PHOS U/L 213* 157* 150*   ALT (SGPT) U/L 52* 45* 50*   AST (SGOT) U/L 70* 60* 65*   ANION GAP mmol/L 13.0 8.0 13.0     Estimated Creatinine Clearance: 109.2 mL/min (by C-G formula based on SCr of 0.96 mg/dL).          Results from last 7 days   Lab Units 02/10/22  0542 02/09/22  0539 02/08/22  0540 02/07/22  0545 02/06/22  0636   WBC 10*3/mm3 12.76* 9.82 9.63 7.86 7.62   HEMOGLOBIN g/dL 15.6 14.7 14.9 15.6 15.7   HEMATOCRIT % 45.8 41.6 42.8 44.5 45.7   PLATELETS 10*3/mm3 212 194 181 157 164           Culture Data:   No results found for: BLOODCX  No results found for: URINECX  No results found for: RESPCX  No results found for: WOUNDCX  No results found for: STOOLCX  No components found for: BODYFLD    Radiology Data:   Imaging Results (Last 24 Hours)     Procedure Component Value Units  Date/Time    XR Chest Post CVA Port [[id number]] Resulted: 02/10/22 1153     Updated: 02/10/22 1203    US Guidance PICC NC [[id number]] Resulted: 02/10/22 1155     Updated: 02/10/22 1155    XR Chest 1 View [[id number]] Collected: 02/10/22 1041     Updated: 02/10/22 1109    Narrative:      XR CHEST 1 VIEW     CLINICAL STATEMENT: hypoxia, J18.9 Pneumonia, unspecified  organism J96.01 Acute respiratory failure with hypoxia U07.1  COVID-19 J12.82 Pneumonia due to coronavirus disease 2019 Z74.09  Other reduced mobility    COMPARISON:  2/6/2022    FINDINGS: Heart is moderately enlarged. Mild pneumomediastinum  and subcutaneous emphysema has developed. No pneumothorax.  Moderate bilateral diffuse lung airspace disease consistent with  pneumonia. No significant pleural effusions.      Impression:        Pneumomediastinum and subcutaneous emphysema has developed. This  may be due to air leak. No significant pneumothorax is  identified. Moderate worsening diffuse bilateral lung pneumonia.    Electronically signed by:  Zheng Ceron MD  2/10/2022 11:06 AM CST  Workstation: [id number]          Assessment/Plan     Hospital Problem List:  Active Problems:    Acute respiratory failure with hypoxia (HCC)    Pneumonia due to COVID-19 virus    COVID-19 virus detected  Essential hypertension  Type 2 diabetes mellitus    Plan  -Patient has worsening respiratory failure with hypoxia and respiratory distress despite high flow nasal cannula by air Vo and BiPAP.  O2 sats remained in the range of 73 to low 80s.  -Patient and his wife are agreeable to endotracheal intubation  -Stat Chest x-ray preintubation today showed some pneumomediastinum and some subcutaneous emphysema without pneumothorax. Patient could have an air leak  -We will monitor with serial chest x-rays post intubation for pneumothorax  -Continue Decadron and trend CRP. Check d-dimer.  -Continue baricitinib  -Continue albuterol inhalers and Symbicort  -We will intubate and start  mechanical ventilation and wean PEEP and FiO2 as tolerated  -We will start sedation with propofol and Versed while on the ventilator  -Hold antihypertensive medication with losartan for now  -Continue Levemir, and NovoLog sliding scale  -Dietitian consult on start tube feeds  -DVT prophylaxis with subcutaneous Lovenox 40 g every 12 hours  -CODE STATUS is full code    Of note, infection control reportedly requires 20 days of isolation from admission due to severity of illness.  Patient is currently day 11 of admission.    38 minutes of critical care time was spent evaluating patient and coordinating treatments.    Discharge Planning: In progress    I confirmed that the patient's Advance Care Plan is present, code status is documented, or surrogate decision maker is listed in the patient's medical record.      I have utilized all available immediate resources to obtain, update, or review the patient's current medications.      Adama Douglas MD   02/10/22   12:04 CST

## 2022-02-10 NOTE — SIGNIFICANT NOTE
Patient's respiratory status has continued to decline.  Will transfer to ICU for intubation.  Consent obtained by wife (Migdalia Brooks)

## 2022-02-10 NOTE — SIGNIFICANT NOTE
The patient has been accepted to [facility] by Dr. Fonseca.  Currently there are no beds.

## 2022-02-10 NOTE — PROCEDURES
"Intubation    Date/Time: 2/10/2022 11:55 AM  Performed by: Adama Douglas MD  Authorized by: Adama Douglas MD   Consent: The procedure was performed in an emergent situation. Verbal consent obtained.  Risks and benefits: risks, benefits and alternatives were discussed  Consent given by: patient and spouse  Patient understanding: patient states understanding of the procedure being performed  Patient consent: the patient's understanding of the procedure matches consent given  Relevant documents: relevant documents present and verified  Test results: test results available and properly labeled  Site marked: the operative site was marked  Imaging studies: imaging studies available  Required items: required blood products, implants, devices, and special equipment available  Patient identity confirmed: hospital-assigned identification number and verbally with patient  Time out: Immediately prior to procedure a \"time out\" was called to verify the correct patient, procedure, equipment, support staff and site/side marked as required.  Indications: respiratory failure and  respiratory distress  Intubation method: video-assisted  Patient status: paralyzed (RSI)  Preoxygenation: BVM  Pretreatment medications: none  Sedatives: etomidate  Paralytic: succinylcholine  Laryngoscope size: Mac 3  Tube size: 7.5 mm  Tube type: cuffed  Number of attempts: 1  Cricoid pressure: no  Cords visualized: yes  Post-procedure assessment: ETCO2 monitor  Breath sounds: equal  Cuff inflated: yes  ETT to lip: 23 cm  Tube secured with: ETT kruger  Chest x-ray interpreted by me.  Chest x-ray findings: endotracheal tube in appropriate position  Patient tolerance: patient tolerated the procedure well with no immediate complications        "

## 2022-02-10 NOTE — SIGNIFICANT NOTE
"Upon room entry, pt on side of bed slowly taking deep breaths. Sat came up to 91%. Pt stated \"this happens at night all the time. I don't want to exert myself and force breaths.\"   Explained to pt next step is BiPAP mask. Pt adamantly refused BiPAP stating, \"no offense, but that's going backwards and I don't want to do that.\" At this time, pt is not under distress and stated he feels fine.   "

## 2022-02-10 NOTE — DISCHARGE PLACEMENT REQUEST
"Da Brooks (56 y.o. Male)             Date of Birth Social Security Number Address Home Phone MRN    1965  [id number] [id number] [phone] [id number]    Alevism Marital Status             Jain        Admission Date Admission Type Admitting Provider Attending Provider Department, Room/Bed    1/30/22 Emergency Dennis Benson MD Ebenibo, Sotonte E, MD [facility] CRITICAL CARE STEPDOWN, 04/A    Discharge Date Discharge Disposition Discharge Destination                         Attending Provider: Adama Douglas MD    Allergies: No Known Allergies    Isolation: Enh Drop/Con   Infection: COVID (confirmed) (01/30/22)   Code Status: CPR   Advance Care Planning Activity    Ht: 182.9 cm (72\")   Wt: 103 kg (227 lb)    Admission Cmt: None   Principal Problem: None                Active Insurance as of 1/30/2022     Primary Coverage     Payor Plan Insurance Group Employer/Plan Group    GLOBAL [employer]BP GLOBAL CARE      Payor Plan Address Payor Plan Phone Number Payor Plan Fax Number Effective Dates    PO  [phone]  1/1/2021 - None Entered    [facility] [id number]       Subscriber Name Subscriber Birth Date Member ID       DEYADA S 1965 [id number]                 Emergency Contacts      (Rel.) Home Phone Work Phone Mobile Phone    DEYARAFFAELECAROL (Spouse) [phone] -- --            Insurance Information                [employer] Phone: [phone]    Subscriber: Da Brooks Subscriber#: [id number]    Group#: [id number] Precert#: --          "

## 2022-02-10 NOTE — NURSING NOTE
Patient transferred to CCU per order. Escorted by RN and RT on Bipap. Report given to Isabel NUNES. Patients belongings sent to CCU with patient.

## 2022-02-10 NOTE — PROGRESS NOTES
TWO PATIENT IDENTIFIERS WERE USED. CONSENT WAS SIGNED PER PATIENT EDUCATION MATERIAL WAS GIVEN TO PATIENT AND / OR FAMILY. THE PATIENT WAS DRAPED WITH FULL BODY DRAPE AND PATIENT'S RIGHT ARM WAS PREPPED WITH CHLORAPREP.  ULTRASOUND WAS USED TO LOCALIZE THERIGHT BASILIC  VEIN. SUBCUTANEOUS TISSUE AT THE CATHETER SITE WAS INFILTRATED WITH 2% LIDOCAINE. UNDER ULTRASOUND GUIDANCE, THE VEIN WAS ACCESSED WITH A 21GAUGE  NEEDLE. AN 0.018 WIRE WAS THEN THREADED THROUGH THE NEEDLE INTO THE CENTRAL VENOUS SYSTEM. THE 21GAUGE  NEEDLE WAS REMOVED AND A 5 Japanese PEEL AWAY SHEATH WAS PLACED OVER THE WIRE. THE PICC LINE CATHETER WAS CUT AT 38 CM. THE PICC LINE CATHETER WAS THEN PLACED OVER THE WIRE INTO THE VEIN, THE SHEATH WAS PEELED AWAY,WIRE WAS REMOVED. CATHETER WAS FLUSHED WITH NORMAL SALINE AND TIPS APPLIED. BIOPATCH PLACED. CATHETER SECURED WITH STATLOCK AND TEGADERM. PATIENT TOLERATED PROCEDURE WELL. THIS WAS DONE IN THE   ICU      IMPRESSION: SUCCESSFUL PLACEMENT OF TRIPLE LUMEN SOLO PICC        Racheal Angel  2/10/2022  12:07 CST

## 2022-02-10 NOTE — DISCHARGE SUMMARY
[facility]  DISCHARGE SUMMARY       Date of Admission: 1/30/2022  Date of Discharge:  2/10/2022  Primary Care Physician: Bartolo Diaz APRN    Presenting Problem/History of Present Illness:  Acute respiratory failure with hypoxia (HCC) [J96.01]  Pneumonia of both lungs due to infectious organism, unspecified part of lung [J18.9]  Pneumonia due to COVID-19 virus [U07.1, J12.82]  Acute respiratory failure (HCC) [J96.00]     Final Discharge Diagnoses:  Active Hospital Problems    Diagnosis    • **Acute respiratory failure with hypoxia (HCC)    • Cytokine release syndrome, grade 4    • Pneumonia due to COVID-19 virus    • COVID-19 virus detected    Type 2 diabetes mellitus  Essential hypertension  Pneumomediastinum    Consults:   Consults     No orders found from 1/1/2022 to 1/31/2022.          Procedures Performed: None                Pertinent Test Results:  Procedure Component Value Units Date/Time   XR Chest 1 View [[id number]] Ander as Reviewed   Order Status: Completed Collected: 02/10/22 1343    Updated: 02/10/22 1458   Narrative:     XR CHEST 1 VIEW     CLINICAL STATEMENT: Pneumomediastinum and sub cut emphysema.   Assess lung fields for interval change, J18.9 Pneumonia,   unspecified organism J96.01 Acute respiratory failure with   hypoxia U07.1 COVID-19 J12.82 Pneumonia due to coronavirus   disease 2019 Z74.09 Other reduced mobility     COMPARISON:  2/10/2022     FINDINGS: Heart is mildly enlarged. Endotracheal tube and enteric   tube are in appropriate placement. Mild subcutaneous emphysema   and pneumomediastinum but appears improved from the prior study.   No significant pneumothorax. Moderate bilateral diffuse lung   airspace disease.    Impression:       Mild improvement in subcutaneous emphysema and pneumomediastinum.     Electronically signed by:  Zheng Ceron MD  2/10/2022 2:56 PM CST   Workstation: [id number]   [facility] [[id number]]  Ander as Reviewed   Order Status: Completed Resulted: 02/10/22 1255    Updated: 02/10/22 1255   Narrative:     This procedure was auto-finalized with no dictation required.   IR PICC W Imaging Guidance [[id number]] Ander as Reviewed   Order Status: Completed Resulted: 02/10/22 1208    Updated: 02/10/22 1208   Narrative:     This procedure was auto-finalized with no dictation required.   XR Chest Post CVA Port [[id number]] Ander as Reviewed   Order Status: Completed Collected: 02/10/22 1153    Updated: 02/10/22 1255   Narrative:     XR CHEST 1 VIEW 2/10/22 at 12:00 PM.     CLINICAL STATEMENT: line placement, J18.9 Pneumonia, unspecified   organism J96.01 Acute respiratory failure with hypoxia U07.1   COVID-19 J12.82 Pneumonia due to coronavirus disease 2019 Z74.09   Other reduced mobility     COMPARISON:  2/10/2022 at 10:36 AM.     FINDINGS: Heart is mildly enlarged. Endotracheal tube and enteric   tube are in appropriate placement. Mild diffuse pneumomediastinum   and mild subcutaneous emphysema, similar to prior study. Right   arm PICC is in place.    Impression:       Endotracheal tube and enteric tube are in place.   Pneumomediastinum and subcutaneous emphysema again noted which   may be due to air leak. No significant pneumothorax at this time.     Electronically signed by:  Zheng Ceron MD  2/10/2022 12:53 PM CST   Workstation: [id number]       Chief Complaint on Day of Discharge: None    Hospital Course:  The patient is a 56 y.o. male with a past medical history of Type 2 DM, HTN, HLD who presented on 1/30/22 with complaints of diarrhea and shortness of breath related to covid 19 of 3 weeks duration. He reported first feeling ill with covid three weeks prior to presentation with initial symptoms symptoms of chills, aches, diarrhea, dry cough, anorexia, fatigue.  He felt some improvement but after the first 4-5 days of symptoms, but symptoms returned 1 week prior to presentation with diarrhea, cough and worsening  "shortness of breath.  He was evaluated in ED at [facility] and was admitted for acute hypoxic respiratory failure related to covid 19 viral pneumonia.  He was started on dexamethasone as well as baricitinib therapy.  CTA of the chest on 1/30/2022 showed no pulmonary embolism.  Patient continued to do poorly and had worsening respiratory failure with hypoxia despite high flow nasal cannula oxygen during the 11 days of his admission.      Eventually patient was desaturating to 70 to 80% SPO2 on arrival and BiPAP and had to be emergently intubated. Chest x-ray prior to intubation showed some pneumomediastinum and subcutaneous emphysema but no obvious evidence of pneumothorax.  Repeat chest x-ray prior to transfer and after intubation showed no evidence of pneumothorax but patient was requiring high levels of PEEP to 15 and FiO2 100% prior to transfer.  Patient was discussed with his wife was agreeable to transfer to [facility] for pulmonology evaluation.  Patient was transferred in stable but critical condition.    Condition on Discharge: Critical    Physical Exam on Discharge:  /64   Pulse 101   Temp 96.9 °F (36.1 °C) (Oral)   Resp (!) 33   Ht 182.9 cm (72\")   Wt 103 kg (227 lb)   SpO2 91%   BMI 30.79 kg/m²      Physical Exam  Constitutional:       General: He is not in acute distress.     Appearance: He is well-developed. He is obese. He is not diaphoretic.   HENT:      Head: Normocephalic and atraumatic.   Eyes:      General: No scleral icterus.     Conjunctiva/sclera: Conjunctivae normal.      Pupils: Pupils are equal, round, and reactive to light.   Neck:      Thyroid: No thyromegaly.      Vascular: No JVD.      Trachea: No tracheal deviation.   Cardiovascular:      Rate and Rhythm: Normal rate and regular rhythm.      Heart sounds: Normal heart sounds. No murmur heard.  No friction rub. No gallop.    Pulmonary:      Effort: Pulmonary effort is normal. No " respiratory distress.      Breath sounds: No stridor. No wheezing or rales.      Comments: Reduced breath sounds globally  Abdominal:      General: Bowel sounds are normal. There is no distension.      Palpations: Abdomen is soft. There is no mass.      Tenderness: There is no abdominal tenderness. There is no guarding or rebound.      Hernia: No hernia is present.   Musculoskeletal:         General: No tenderness or deformity. Normal range of motion.      Right lower leg: No edema.      Left lower leg: No edema.   Lymphadenopathy:      Cervical: No cervical adenopathy.   Skin:     General: Skin is warm and dry.      Coloration: Skin is not pale.      Findings: No erythema or rash.   Neurological:      Motor: No abnormal muscle tone.      Comments: Intubated and sedated   Psychiatric:      Comments: Intubated and sedated         Discharge Disposition:  Short Term Hospital (DC - External)    Discharge Medications:     Discharge Medications      New Medications      Instructions Start Date   albuterol sulfate  (90 Base) MCG/ACT inhaler  Commonly known as: PROVENTIL HFA;VENTOLIN HFA;PROAIR HFA   2 puffs, Inhalation, 4 Times Daily - RT      baricitinib 2 MG tablet tablet  Commonly known as: OLUMIANT   4 mg, Oral, Daily   Start Date: February 11, 2022     benzonatate 100 MG capsule  Commonly known as: TESSALON   100 mg, Oral, 3 Times Daily PRN      budesonide-formoterol 160-4.5 MCG/ACT inhaler  Commonly known as: SYMBICORT   2 puffs, Inhalation, 2 Times Daily - RT      chlorhexidine 0.12 % solution  Commonly known as: PERIDEX   15 mL, Mouth/Throat, Every 12 Hours Scheduled      dexamethasone 6 MG tablet  Commonly known as: DECADRON   6 mg, Oral, Every 12 Hours Scheduled      dextromethorphan polistirex ER 30 MG/5ML Suspension Extended Release oral suspension  Commonly known as: DELSYM   60 mg, Oral, Every 12 Hours PRN      enoxaparin 40 MG/0.4ML solution syringe  Commonly known as: LOVENOX   40 mg, Subcutaneous,  Every 12 Hours      famotidine 10 MG/ML solution injection  Commonly known as: PEPCID   20 mg, Intravenous, 2 Times Daily      fentaNYL citrate (PF) 50 mcg/mL injection  Commonly known as: SUBLIMAZE   50 mcg, Intravenous, Every 30 Minutes PRN      insulin aspart 100 UNIT/ML injection  Commonly known as: novoLOG   0-24 Units, Subcutaneous, 3 Times Daily Before Meals      insulin detemir 100 UNIT/ML injection  Commonly known as: LEVEMIR   22 Units, Subcutaneous, Nightly      ipratropium 17 MCG/ACT inhaler  Commonly known as: ATROVENT HFA   2 puffs, Inhalation, 4 Times Daily - RT      midazolam (VERSED) 50mg/100mL normal saline 50 mg/100 mL solution   1-10 mg/hr (1-10 mg/hr), Intravenous, Titrated      Norepinephrine-Sodium Chloride 8-0.9 MG/250ML-% solution infusion  Commonly known as: LEVOPHED   0.02-0.3 mcg/kg/min (2.06-30.9 mcg/min), Intravenous, Titrated      propofol 10 mg/mL emulsion infusion  Commonly known as: DIPRIVAN   5-50 mcg/kg/min (515-5,150 mcg/min), Intravenous, Titrated      sodium chloride 0.9 % solution   50 mL/hr (50 mL/hr), Intravenous, Continuous         Continue These Medications      Instructions Start Date   atorvastatin 40 MG tablet  Commonly known as: Lipitor   40 mg, Oral, Nightly      empagliflozin 25 MG tablet tablet  Commonly known as: Jardiance   25 mg, Oral, Daily      glucose blood test strip   1 each, Other, 2 times daily, Use as instructed      losartan 25 MG tablet  Commonly known as: COZAAR   Take 1 tablet by mouth once daily      metFORMIN  MG 24 hr tablet  Commonly known as: GLUCOPHAGE-XR   1,000 mg, Oral, 2 Times Daily With Meals      tadalafil 5 MG tablet  Commonly known as: CIALIS   TAKE 1 TABLET BY MOUTH ONCE DAILY AS NEEDED FOR ERECTILE DYSFUNCTION         Stop These Medications    omeprazole 20 MG capsule  Commonly known as: priLOSEC            Discharge Diet:   Diet Instructions     Diet: Nothing By Mouth      Discharge Diet: Nothing By Mouth          Activity at  Discharge:   Activity Instructions     Activity as Tolerated            Discharge Care Plan/Instructions:   1.  You are being transferred to [facility] under the care of hospitalist Dr. Angelo for your respiratory failure from COVID-19 pneumonia.    Follow-up Appointments:   Future Appointments   Date Time Provider Department Center   2/10/2022  6:00 PM MAD XR PORTABLE [facility] MAD XRAY MAD       Test Results Pending at Discharge:   Pending Labs     Order Current Status    Extra Tubes In process    Green Top (Gel) In process            Adama Douglas MD  02/10/22  14:30 CST    Time: 38 minutes of time was spent evaluating patient and planning discharge.      Part of this note may be an electronic transcription/translation of spoken language to printed text using the Dragon Dictation system.

## 2022-02-10 NOTE — NURSING NOTE
Patient maxed out on bipap. o2 stat 85-87%. Abg sent. Patient is now seeing things in his room. FLORENCE Sanchez notified.

## 2022-02-10 NOTE — NURSING NOTE
"Patient O2 dropping to 73% when up to BSC. Upon RN entering room patient was slowly taking deep breaths, SAT slowly coming back up to 87%. RN explained to patient the next step is BIPAP. Pt adamantly refusing BIPAP. States \" he is making other arrangements and will not need to be here much longer\". Pt states \" he feels fine:\" Does not appear to be in distress at this time. Will continue to monitor O2 sat closely.   "

## 2022-02-11 NOTE — PAYOR COMM NOTE
"Ginger Moseley  Case Managment Extender  P) [phone]  (F) [phone]    Auth#[id number]  Da Brooks (56 y.o. Male)             Date of Birth Social Security Number Address Home Phone MRN    1965  [address] [place] [address] [phone] [id number]    Yazidi Marital Status             Taoism        Admission Date Admission Type Admitting Provider Attending Provider Department, Room/Bed    1/30/22 Emergency Dennis Benson MD  [facility] STEPDOWN, 04/A    Discharge Date Discharge Disposition Discharge Destination          2/10/2022 Short Term Hospital ([facility] - External)              Attending Provider: (none)   Allergies: No Known Allergies    Isolation: None   Infection: COVID (confirmed) (01/30/22)   Code Status: Prior   Advance Care Planning Activity    Ht: 182.9 cm (72\")   Wt: 103 kg (227 lb)    Admission Cmt: None   Principal Problem: Acute respiratory failure with hypoxia (HCC) [J96.01]                 Active Insurance as of 1/30/2022     Primary Coverage     Payor Plan Insurance Group Employer/Plan Group    GLOBAL CARE LBP GLOBAL CARE      Payor Plan Address Payor Plan Phone Number Payor Plan Fax Number Effective Dates    PO  [phone]  1/1/2021 - None Entered    [facility] [id number]       Subscriber Name Subscriber Birth Date Member ID       DA BROOKS 1965 [id number]                 Emergency Contacts      (Rel.) Home Phone Work Phone Mobile Phone    CAROL BROOKS (Spouse) [phone] -- --               Discharge Summary      Adama Douglas MD at 02/10/22 1430              [facility] Medicine Services  DISCHARGE SUMMARY       Date of Admission: 1/30/2022  Date of Discharge:  2/10/2022  Primary Care Physician: Bartolo Diaz APRN    Presenting Problem/History of Present Illness:  Acute respiratory failure with " hypoxia (HCC) [J96.01]  Pneumonia of both lungs due to infectious organism, unspecified part of lung [J18.9]  Pneumonia due to COVID-19 virus [U07.1, J12.82]  Acute respiratory failure (HCC) [J96.00]     Final Discharge Diagnoses:  Active Hospital Problems    Diagnosis    • **Acute respiratory failure with hypoxia (HCC)    • Cytokine release syndrome, grade 4    • Pneumonia due to COVID-19 virus    • COVID-19 virus detected    Type 2 diabetes mellitus  Essential hypertension  Pneumomediastinum    Consults:   Consults     No orders found from 1/1/2022 to 1/31/2022.          Procedures Performed: None                Pertinent Test Results:  Procedure Component Value Units Date/Time   XR Chest 1 View [[id number]] Ander as Reviewed   Order Status: Completed Collected: 02/10/22 1343    Updated: 02/10/22 1458   Narrative:     XR CHEST 1 VIEW     CLINICAL STATEMENT: Pneumomediastinum and sub cut emphysema.   Assess lung fields for interval change, J18.9 Pneumonia,   unspecified organism J96.01 Acute respiratory failure with   hypoxia U07.1 COVID-19 J12.82 Pneumonia due to coronavirus   disease 2019 Z74.09 Other reduced mobility     COMPARISON:  2/10/2022     FINDINGS: Heart is mildly enlarged. Endotracheal tube and enteric   tube are in appropriate placement. Mild subcutaneous emphysema   and pneumomediastinum but appears improved from the prior study.   No significant pneumothorax. Moderate bilateral diffuse lung   airspace disease.    Impression:       Mild improvement in subcutaneous emphysema and pneumomediastinum.     Electronically signed by:  Zheng Ceron MD  2/10/2022 2:56 PM CST   Workstation: [id number]   US Guidance PICC NC [[id number]] Ander as Reviewed   Order Status: Completed Resulted: 02/10/22 1255    Updated: 02/10/22 1255   Narrative:     This procedure was auto-finalized with no dictation required.   IR PICC W Imaging Guidance [[id number]] Ander as Reviewed   Order Status: Completed Resulted: 02/10/22 1208     Updated: 02/10/22 1208   Narrative:     This procedure was auto-finalized with no dictation required.   XR Chest Post CVA Port [[id number]] Ander as Reviewed   Order Status: Completed Collected: 02/10/22 1153    Updated: 02/10/22 1255   Narrative:     XR CHEST 1 VIEW 2/10/22 at 12:00 PM.     CLINICAL STATEMENT: line placement, J18.9 Pneumonia, unspecified   organism J96.01 Acute respiratory failure with hypoxia U07.1   COVID-19 J12.82 Pneumonia due to coronavirus disease 2019 Z74.09   Other reduced mobility     COMPARISON:  2/10/2022 at 10:36 AM.     FINDINGS: Heart is mildly enlarged. Endotracheal tube and enteric   tube are in appropriate placement. Mild diffuse pneumomediastinum   and mild subcutaneous emphysema, similar to prior study. Right   arm PICC is in place.    Impression:       Endotracheal tube and enteric tube are in place.   Pneumomediastinum and subcutaneous emphysema again noted which   may be due to air leak. No significant pneumothorax at this time.     Electronically signed by:  Zheng Ceron MD  2/10/2022 12:53 PM CST   Workstation: [id number]       Chief Complaint on Day of Discharge: None    Hospital Course:  The patient is a 56 y.o. male with a past medical history of Type 2 DM, HTN, HLD who presented on 1/30/22 with complaints of diarrhea and shortness of breath related to covid 19 of 3 weeks duration. He reported first feeling ill with covid three weeks prior to presentation with initial symptoms symptoms of chills, aches, diarrhea, dry cough, anorexia, fatigue.  He felt some improvement but after the first 4-5 days of symptoms, but symptoms returned 1 week prior to presentation with diarrhea, cough and worsening shortness of breath.  He was evaluated in ED at [facility] and was admitted for acute hypoxic respiratory failure related to covid 19 viral pneumonia.  He was started on dexamethasone as well as baricitinib therapy.  CTA of the chest on 1/30/2022 showed no  "pulmonary embolism.  Patient continued to do poorly and had worsening respiratory failure with hypoxia despite high flow nasal cannula oxygen during the 11 days of his admission.      Eventually patient was desaturating to 70 to 80% SPO2 on arrival and BiPAP and had to be emergently intubated. Chest x-ray prior to intubation showed some pneumomediastinum and subcutaneous emphysema but no obvious evidence of pneumothorax.  Repeat chest x-ray prior to transfer and after intubation showed no evidence of pneumothorax but patient was requiring high levels of PEEP to 15 and FiO2 100% prior to transfer.  Patient was discussed with his wife was agreeable to transfer to [facility] for pulmonology evaluation.  Patient was transferred in stable but critical condition.    Condition on Discharge: Critical    Physical Exam on Discharge:  /64   Pulse 101   Temp 96.9 °F (36.1 °C) (Oral)   Resp (!) 33   Ht 182.9 cm (72\")   Wt 103 kg (227 lb)   SpO2 91%   BMI 30.79 kg/m²      Physical Exam  Constitutional:       General: He is not in acute distress.     Appearance: He is well-developed. He is obese. He is not diaphoretic.   HENT:      Head: Normocephalic and atraumatic.   Eyes:      General: No scleral icterus.     Conjunctiva/sclera: Conjunctivae normal.      Pupils: Pupils are equal, round, and reactive to light.   Neck:      Thyroid: No thyromegaly.      Vascular: No JVD.      Trachea: No tracheal deviation.   Cardiovascular:      Rate and Rhythm: Normal rate and regular rhythm.      Heart sounds: Normal heart sounds. No murmur heard.  No friction rub. No gallop.    Pulmonary:      Effort: Pulmonary effort is normal. No respiratory distress.      Breath sounds: No stridor. No wheezing or rales.      Comments: Reduced breath sounds globally  Abdominal:      General: Bowel sounds are normal. There is no distension.      Palpations: Abdomen is soft. There is no mass.      Tenderness: There is no " abdominal tenderness. There is no guarding or rebound.      Hernia: No hernia is present.   Musculoskeletal:         General: No tenderness or deformity. Normal range of motion.      Right lower leg: No edema.      Left lower leg: No edema.   Lymphadenopathy:      Cervical: No cervical adenopathy.   Skin:     General: Skin is warm and dry.      Coloration: Skin is not pale.      Findings: No erythema or rash.   Neurological:      Motor: No abnormal muscle tone.      Comments: Intubated and sedated   Psychiatric:      Comments: Intubated and sedated         Discharge Disposition:  Short Term Hospital (DC - External)    Discharge Medications:     Discharge Medications      New Medications      Instructions Start Date   albuterol sulfate  (90 Base) MCG/ACT inhaler  Commonly known as: PROVENTIL HFA;VENTOLIN HFA;PROAIR HFA   2 puffs, Inhalation, 4 Times Daily - RT      baricitinib 2 MG tablet tablet  Commonly known as: OLUMIANT   4 mg, Oral, Daily   Start Date: February 11, 2022     benzonatate 100 MG capsule  Commonly known as: TESSALON   100 mg, Oral, 3 Times Daily PRN      budesonide-formoterol 160-4.5 MCG/ACT inhaler  Commonly known as: SYMBICORT   2 puffs, Inhalation, 2 Times Daily - RT      chlorhexidine 0.12 % solution  Commonly known as: PERIDEX   15 mL, Mouth/Throat, Every 12 Hours Scheduled      dexamethasone 6 MG tablet  Commonly known as: DECADRON   6 mg, Oral, Every 12 Hours Scheduled      dextromethorphan polistirex ER 30 MG/5ML Suspension Extended Release oral suspension  Commonly known as: DELSYM   60 mg, Oral, Every 12 Hours PRN      enoxaparin 40 MG/0.4ML solution syringe  Commonly known as: LOVENOX   40 mg, Subcutaneous, Every 12 Hours      famotidine 10 MG/ML solution injection  Commonly known as: PEPCID   20 mg, Intravenous, 2 Times Daily      fentaNYL citrate (PF) 50 mcg/mL injection  Commonly known as: SUBLIMAZE   50 mcg, Intravenous, Every 30 Minutes PRN      insulin aspart 100 UNIT/ML  injection  Commonly known as: novoLOG   0-24 Units, Subcutaneous, 3 Times Daily Before Meals      insulin detemir 100 UNIT/ML injection  Commonly known as: LEVEMIR   22 Units, Subcutaneous, Nightly      ipratropium 17 MCG/ACT inhaler  Commonly known as: ATROVENT HFA   2 puffs, Inhalation, 4 Times Daily - RT      midazolam (VERSED) 50mg/100mL normal saline 50 mg/100 mL solution   1-10 mg/hr (1-10 mg/hr), Intravenous, Titrated      Norepinephrine-Sodium Chloride 8-0.9 MG/250ML-% solution infusion  Commonly known as: LEVOPHED   0.02-0.3 mcg/kg/min (2.06-30.9 mcg/min), Intravenous, Titrated      propofol 10 mg/mL emulsion infusion  Commonly known as: DIPRIVAN   5-50 mcg/kg/min (515-5,150 mcg/min), Intravenous, Titrated      sodium chloride 0.9 % solution   50 mL/hr (50 mL/hr), Intravenous, Continuous         Continue These Medications      Instructions Start Date   atorvastatin 40 MG tablet  Commonly known as: Lipitor   40 mg, Oral, Nightly      empagliflozin 25 MG tablet tablet  Commonly known as: Jardiance   25 mg, Oral, Daily      glucose blood test strip   1 each, Other, 2 times daily, Use as instructed      losartan 25 MG tablet  Commonly known as: COZAAR   Take 1 tablet by mouth once daily      metFORMIN  MG 24 hr tablet  Commonly known as: GLUCOPHAGE-XR   1,000 mg, Oral, 2 Times Daily With Meals      tadalafil 5 MG tablet  Commonly known as: CIALIS   TAKE 1 TABLET BY MOUTH ONCE DAILY AS NEEDED FOR ERECTILE DYSFUNCTION         Stop These Medications    omeprazole 20 MG capsule  Commonly known as: priLOSEC            Discharge Diet:   Diet Instructions     Diet: Nothing By Mouth      Discharge Diet: Nothing By Mouth          Activity at Discharge:   Activity Instructions     Activity as Tolerated            Discharge Care Plan/Instructions:   1.  You are being transferred to [facility] under the care of hospitalist Dr. Angelo for your respiratory failure from COVID-19  pneumonia.    Follow-up Appointments:   Future Appointments   Date Time Provider Department Center   2/10/2022  6:00 PM MAD XR PORTABLE BH MAD XRAY MAD       Test Results Pending at Discharge:   Pending Labs     Order Current Status    Extra Tubes In process    Green Top (Gel) In process            Adama Douglas MD  02/10/22  14:30 CST    Time: 38 minutes of time was spent evaluating patient and planning discharge.      Part of this note may be an electronic transcription/translation of spoken language to printed text using the Dragon Dictation system.          Electronically signed by Adama Douglas MD at 02/10/22 0182

## 2022-02-22 ENCOUNTER — TELEPHONE (OUTPATIENT)
Dept: FAMILY MEDICINE CLINIC | Facility: CLINIC | Age: 57
End: 2022-02-22

## 2022-02-22 NOTE — TELEPHONE ENCOUNTER
Pt's wife, Migdalia called and was checking the status of the short term disability forms that she brought in earlier today.  [phone]

## 2022-02-23 ENCOUNTER — TELEPHONE (OUTPATIENT)
Dept: FAMILY MEDICINE CLINIC | Facility: CLINIC | Age: 57
End: 2022-02-23

## 2022-02-23 NOTE — TELEPHONE ENCOUNTER
Pt dropped off disability paperwork and needs to pick today if possible so can we call and let her know when it is ready [phone]

## 2022-02-23 NOTE — TELEPHONE ENCOUNTER
Patient was called; spoke to wife Carmela to let her know paperwork was ready for pickup at the .

## 2023-03-03 NOTE — NURSING NOTE
Patient's wife, Lissett, informed of patient transfer to [facility] room 3906. Provided wife with phone number to call for updates   4